# Patient Record
Sex: FEMALE | Race: WHITE | NOT HISPANIC OR LATINO | Employment: OTHER | URBAN - METROPOLITAN AREA
[De-identification: names, ages, dates, MRNs, and addresses within clinical notes are randomized per-mention and may not be internally consistent; named-entity substitution may affect disease eponyms.]

---

## 2017-01-26 ENCOUNTER — HOSPITAL ENCOUNTER (OUTPATIENT)
Dept: RADIOLOGY | Facility: OTHER | Age: 77
Discharge: HOME | End: 2017-01-26
Attending: OBSTETRICS & GYNECOLOGY
Payer: MEDICARE

## 2017-01-26 DIAGNOSIS — N64.4 MASTALGIA: ICD-10-CM

## 2017-01-26 DIAGNOSIS — M79.622 AXILLARY PAIN, LEFT: ICD-10-CM

## 2017-01-26 PROCEDURE — 77066 DX MAMMO INCL CAD BI: CPT | Mod: TC

## 2017-01-26 PROCEDURE — 77062 BREAST TOMOSYNTHESIS BI: CPT | Mod: 26,,, | Performed by: RADIOLOGY

## 2017-01-26 PROCEDURE — 77066 DX MAMMO INCL CAD BI: CPT | Mod: 26,,, | Performed by: RADIOLOGY

## 2018-07-17 ENCOUNTER — HOSPITAL ENCOUNTER (OUTPATIENT)
Dept: RADIOLOGY | Facility: OTHER | Age: 78
Discharge: HOME | End: 2018-07-17
Attending: OBSTETRICS & GYNECOLOGY
Payer: MEDICARE

## 2018-07-17 VITALS — WEIGHT: 195 LBS | BODY MASS INDEX: 35.88 KG/M2 | HEIGHT: 62 IN

## 2018-07-17 DIAGNOSIS — Z12.31 VISIT FOR SCREENING MAMMOGRAM: ICD-10-CM

## 2018-07-17 PROCEDURE — 77067 SCR MAMMO BI INCL CAD: CPT | Mod: 26,,, | Performed by: RADIOLOGY

## 2018-07-17 PROCEDURE — 77063 BREAST TOMOSYNTHESIS BI: CPT | Mod: TC

## 2018-07-17 PROCEDURE — 77063 BREAST TOMOSYNTHESIS BI: CPT | Mod: 26,,, | Performed by: RADIOLOGY

## 2019-06-11 ENCOUNTER — OFFICE VISIT (OUTPATIENT)
Dept: URGENT CARE | Facility: CLINIC | Age: 79
End: 2019-06-11
Payer: MEDICARE

## 2019-06-11 VITALS
OXYGEN SATURATION: 98 % | RESPIRATION RATE: 16 BRPM | HEIGHT: 62 IN | WEIGHT: 187 LBS | SYSTOLIC BLOOD PRESSURE: 146 MMHG | DIASTOLIC BLOOD PRESSURE: 91 MMHG | HEART RATE: 69 BPM | BODY MASS INDEX: 34.41 KG/M2 | TEMPERATURE: 98 F

## 2019-06-11 DIAGNOSIS — J20.9 ACUTE BRONCHITIS, UNSPECIFIED ORGANISM: ICD-10-CM

## 2019-06-11 DIAGNOSIS — R05.9 COUGH: Primary | ICD-10-CM

## 2019-06-11 PROCEDURE — 71046 X-RAY EXAM CHEST 2 VIEWS: CPT | Mod: S$GLB,,, | Performed by: RADIOLOGY

## 2019-06-11 PROCEDURE — 71046 XR CHEST PA AND LATERAL: ICD-10-PCS | Mod: S$GLB,,, | Performed by: RADIOLOGY

## 2019-06-11 PROCEDURE — 99214 PR OFFICE/OUTPT VISIT, EST, LEVL IV, 30-39 MIN: ICD-10-PCS | Mod: S$GLB,,, | Performed by: EMERGENCY MEDICINE

## 2019-06-11 PROCEDURE — 99214 OFFICE O/P EST MOD 30 MIN: CPT | Mod: S$GLB,,, | Performed by: EMERGENCY MEDICINE

## 2019-06-11 RX ORDER — MOXIFLOXACIN HYDROCHLORIDE 400 MG/1
400 TABLET ORAL DAILY
Qty: 7 TABLET | Refills: 0 | Status: SHIPPED | OUTPATIENT
Start: 2019-06-11 | End: 2019-06-18

## 2019-06-11 RX ORDER — BENZONATATE 100 MG/1
100 CAPSULE ORAL EVERY 6 HOURS PRN
Qty: 40 CAPSULE | Refills: 1 | Status: SHIPPED | OUTPATIENT
Start: 2019-06-11 | End: 2019-06-25

## 2019-06-11 RX ORDER — CEFDINIR 300 MG/1
CAPSULE ORAL
Refills: 0 | COMMUNITY
Start: 2019-05-30 | End: 2019-11-08

## 2019-06-11 RX ORDER — ESCITALOPRAM OXALATE 10 MG/1
TABLET ORAL
COMMUNITY
End: 2019-12-09

## 2019-06-11 RX ORDER — CLONAZEPAM 0.5 MG/1
TABLET ORAL
COMMUNITY
End: 2019-12-09

## 2019-06-11 RX ORDER — AZELASTINE HYDROCHLORIDE AND FLUTICASONE PROPIONATE 137; 50 UG/1; UG/1
SPRAY, METERED NASAL
Refills: 1 | COMMUNITY
Start: 2019-04-05 | End: 2021-07-23 | Stop reason: SDUPTHER

## 2019-06-11 RX ORDER — ASPIRIN 81 MG/1
81 TABLET ORAL DAILY
COMMUNITY

## 2019-06-11 RX ORDER — DIAZEPAM 2 MG/1
TABLET ORAL
COMMUNITY
End: 2019-12-09

## 2019-06-11 RX ORDER — DIFLUPREDNATE OPHTHALMIC 0.5 MG/ML
EMULSION OPHTHALMIC
COMMUNITY
End: 2021-10-15

## 2019-06-11 RX ORDER — CETIRIZINE HYDROCHLORIDE 10 MG/1
10 TABLET ORAL DAILY
COMMUNITY

## 2019-06-11 RX ORDER — METHOCARBAMOL 750 MG/1
TABLET, FILM COATED ORAL
COMMUNITY
End: 2019-12-09

## 2019-06-11 RX ORDER — NEOMYCIN SULFATE, POLYMYXIN B SULFATE, AND DEXAMETHASONE 3.5; 10000; 1 MG/G; [USP'U]/G; MG/G
OINTMENT OPHTHALMIC
COMMUNITY
End: 2019-12-09

## 2019-06-11 NOTE — PROGRESS NOTES
"Subjective:       Patient ID: Annette Lynch is a 78 y.o. female.    Vitals:    06/11/19 1516   BP: (!) 146/91   Pulse: 69   Resp: 16   Temp: 98 °F (36.7 °C)   TempSrc: Oral   SpO2: 98%   Weight: 84.8 kg (187 lb)   Height: 5' 2" (1.575 m)       Chief Complaint: Cough    Patient reports cough for 2-3 weeks.patient reports seeing ENT and getting antibiotics on May 31 st.Patient finished antibiotics and is still coughing.    Cough   This is a new problem. Episode onset: 3 weeks. The problem has been gradually worsening. The problem occurs hourly. The cough is productive of sputum. Associated symptoms include postnasal drip and rhinorrhea. Pertinent negatives include no chest pain, chills, ear pain, eye redness, fever, headaches, myalgias, nasal congestion, sore throat, shortness of breath or wheezing. The symptoms are aggravated by lying down. Treatments tried: zyrterc,mucinex,Robitussin. The treatment provided no relief. Her past medical history is significant for environmental allergies.     Review of Systems   Constitution: Negative for chills, fever and malaise/fatigue.   HENT: Positive for postnasal drip and rhinorrhea. Negative for congestion, ear pain, hoarse voice and sore throat.    Eyes: Negative for discharge and redness.   Cardiovascular: Negative for chest pain, dyspnea on exertion and leg swelling.   Respiratory: Positive for cough and sputum production. Negative for shortness of breath and wheezing.    Musculoskeletal: Negative for myalgias.   Gastrointestinal: Negative for abdominal pain and nausea.   Neurological: Negative for headaches.   Allergic/Immunologic: Positive for environmental allergies.       Objective:      Physical Exam   Constitutional: She is oriented to person, place, and time. She appears well-developed and well-nourished. She is cooperative.  Non-toxic appearance. She does not appear ill. No distress.   Elderly, overweight   HENT:   Head: Normocephalic and atraumatic.   Right Ear: " Hearing, tympanic membrane, external ear and ear canal normal.   Left Ear: Hearing, tympanic membrane, external ear and ear canal normal.   Nose: Rhinorrhea present. No mucosal edema or nasal deformity. No epistaxis. Right sinus exhibits no maxillary sinus tenderness and no frontal sinus tenderness. Left sinus exhibits no maxillary sinus tenderness and no frontal sinus tenderness.   Mouth/Throat: Uvula is midline, oropharynx is clear and moist and mucous membranes are normal. No trismus in the jaw. Normal dentition. No uvula swelling. No posterior oropharyngeal erythema.   Eyes: Conjunctivae and lids are normal. No scleral icterus.   Sclera clear bilat   Neck: Trachea normal, full passive range of motion without pain and phonation normal. Neck supple.   Cardiovascular: Normal rate, regular rhythm, normal heart sounds, intact distal pulses and normal pulses.   Pulmonary/Chest: Effort normal and breath sounds normal. No respiratory distress.   +cough on exam   Abdominal: Soft. Normal appearance and bowel sounds are normal. She exhibits no distension. There is no tenderness.   Musculoskeletal: Normal range of motion. She exhibits no edema or deformity.   Neurological: She is alert and oriented to person, place, and time. She exhibits normal muscle tone. Coordination normal.   Skin: Skin is warm, dry and intact. She is not diaphoretic. No pallor.   Psychiatric: She has a normal mood and affect. Her speech is normal and behavior is normal. Judgment and thought content normal. Cognition and memory are normal.   Nursing note and vitals reviewed.      Assessment:       1. Cough    2. Acute bronchitis, unspecified organism        Plan:       Annette was seen today for cough.    Diagnoses and all orders for this visit:    Cough  -     X-Ray Chest PA And Lateral; Future    Acute bronchitis, unspecified organism    Other orders  -     benzonatate (TESSALON PERLES) 100 MG capsule; Take 1 capsule (100 mg total) by mouth every 6  (six) hours as needed for Cough.  -     moxifloxacin (AVELOX) 400 mg tablet; Take 1 tablet (400 mg total) by mouth once daily. for 7 days          X-ray Chest Pa And Lateral    Result Date: 6/11/2019  EXAMINATION: XR CHEST PA AND LATERAL CLINICAL HISTORY: Cough TECHNIQUE: PA and lateral views of the chest were performed. COMPARISON: None FINDINGS: The cardiomediastinal silhouette is not enlarged, noting hiatal hernia..  There is no pleural effusion.  The trachea is midline.  The lungs are symmetrically expanded bilaterally with minimally coarse interstitial attenuation.  There is a possible calcified granuloma projected over the right lower lung zone..  No large focal consolidation seen.  There is no pneumothorax.  The osseous structures are remarkable for degenerative changes..     1. No acute cardiopulmonary process. Electronically signed by: Dudley Lemon MD Date:    06/11/2019 Time:    15:45      Patient Instructions   Please return here or go to the Emergency Department for any concerns or worsening of condition    Zyrtec, Claritin, or Allegra OTC as directed for the next 7 days    Dymista as directed for the next 7 days    Salt Water Nasal Spray OTC 3x/day for the next 7 days      Start taking antibiotics if condition worsens      Follow up with Primary Care if not improved in 7-10 Days:  [id number]      Bronchitis, Viral (Adult)    You have a viral bronchitis. Bronchitis is inflammation and swelling of the lining of the lungs. This is often caused by an infection. Symptoms include a dry, hacking cough that is worse at night. The cough may bring up yellow-green mucus. You may also feel short of breath or wheeze. Other symptoms may include tiredness, chest discomfort, and chills.  Bronchitis that is caused by a virus is not treated with antibiotics. Instead, medicines may be given to help relieve symptoms. Symptoms can last up to 2 weeks, although the cough may last much longer.  This illness is contagious  during the first few days and is spread through the air by coughing and sneezing, or by direct contact (touching the sick person and then touching your own eyes, nose, or mouth).  Most viral illnesses resolve within 10 to 14 days with rest and simple home remedies, although they may sometimes last for several weeks.  Home care  · If symptoms are severe, rest at home for the first 2 to 3 days. When you go back to your usual activities, don't let yourself get too tired.  · Do not smoke. Also avoid being exposed to secondhand smoke.  · You may use over-the-counter medicine to control fever or pain, unless another pain medicine was prescribed. (Note: If you have chronic liver or kidney disease or have ever had a stomach ulcer or gastrointestinal bleeding, talk with your healthcare provider before using these medicines. Also talk to your provider if you are taking medicine to prevent blood clots.) Aspirin should never be given to anyone younger than 18 years of age who is ill with a viral infection or fever. It may cause severe liver or brain damage.  · Your appetite may be poor, so a light diet is fine. Avoid dehydration by drinking 6 to 8 glasses of fluids per day (such as water, soft drinks, sports drinks, juices, tea, or soup). Extra fluids will help loosen secretions in the nose and lungs.  · Over-the-counter cough, cold, and sore-throat medicines will not shorten the length of the illness, but they may help to reduce symptoms. (Note: Do not use decongestants if you have high blood pressure.)  Follow-up care  Follow up with your healthcare provider, or as advised. If you had an X-ray or ECG (electrocardiogram), a specialist will review it. You will be notified of any new findings that may affect your care.  Note: If you are age 65 or older, or if you have a chronic lung disease or condition that affects your immune system, or you smoke, talk to your healthcare provider about having pneumococcal vaccinations and a  yearly influenza vaccination (flu shot).  When to seek medical advice  Call your healthcare provider right away if any of these occur:  · Fever of 100.4°F (38°C) or higher  · Coughing up increased amounts of colored sputum  · Weakness, drowsiness, headache, facial pain, ear pain, or a stiff neck  Call 911, or get immediate medical care  Contact emergency services right away if any of these occur:  · Coughing up blood  · Worsening weakness, drowsiness, headache, or stiff neck  · Trouble breathing, wheezing, or pain with breathing  Date Last Reviewed: 9/13/2015  © 7672-3157 [employer]. [address] [place] [address]. All rights reserved. This information is not intended as a substitute for professional medical care. Always follow your healthcare professional's instructions.        When to Use Antibiotics   Antibiotics are medicines used to treat infections caused by bacteria. They dont work for illnesses caused by viruses or an allergic reaction. In fact, taking antibiotics for reasons other than a bacterial infection can cause problems. For example, you may have side effects from the medicine. And if you really need an antibiotic, it may not work well.                                                                                                                                              When antibiotics wont help  Your healthcare provider wont usually prescribe antibiotics for the following conditions. You can help by not asking for them if you have:   · A cold. This type of illness is caused by a virus. It can cause a runny nose, stuffed-up nose, sneezing, coughing, headache, mild body aches, and low fever. A cold gets better on its own in a few days to a week.  · The flu (influenza). This is a respiratory illness caused by a virus. The flu usually goes away on its own in a week or so. It can cause fever, body aches, sore throat, and fatigue.  · Bronchitis. This is an infection in  the lungs most often caused by a virus. You may have coughing, phlegm, body aches, and a low fever. A common type of bronchitis is known as a chest cold (acute bronchitis). This often happens after you have a respiratory infection like a common cold. Bronchitis can take weeks to go away, but antibiotics usually dont help.  · Most sore throats. Sore throats are most often caused by viruses. Your throat may feel scratchy or achy, and it may hurt to swallow. You may also have a low fever and body aches. A sore throat usually gets better in a few days.  · Most ear infections. An ear infection may be caused by a virus or bacteria. It causes pain in the ear. Antibiotics usually dont help, and the infection goes away on its own.  · Most sinus infections (sinusitis). This kind of infection causes sinus pain and swelling, and a runny nose. In most cases, sinusitis goes away on its own, and antibiotics dont make recovery quicker.  · Allergic rhinitis. This is a set of symptoms caused by an allergic reaction. You may have sneezing, a runny nose, itchy or watery eyes, or a sore throat. Allergies are not treated with antibiotics.  · Low fever. A mild fever thats less than 100.4°F (38°C) most likely doesnt need treatment with antibiotics.   When antibiotics can help   Antibiotics can be used to treat:                                                     · Strep throat. This is a throat infectioncaused by a certain type of bacteria. Symptoms of strep throat include a sore throat, white patches on the tonsils, red spots on the roof of the mouth, fever, body aches, and nausea and vomiting.  · Urinary tract infection (UTI). This is a bacterial infection of the bladder and the tube that takes urine out of the body. It can cause burning pain and urine thats cloudy or tinted with blood. UTIs are very common. Antibiotics usually help treat these infections.  · Some ear infections. In some cases, a healthcare provider may prescribe  antibiotics for an ear infection. You may need a test to show whats causing the ear infection.  · Some sinus infections. In some cases, yourhealthcare provider may give you antibiotics. He or she may first need to make sure your symptoms arent caused by a virus, fungus, allergies, or air pollutants such as smoke.   Your doctor may also recommend antibiotics if you have a condition that can affect your immune system, such as diabetes or cancer.   Self-care at home   If your infection cant be treated with antibiotics, you can take other steps to feel better. Try the remedies below. In general:   · Rest and sleep as much as needed.  · Drink water and other clear fluids.  · Dont smoke, and avoid smoke from other people.  · Use over-the-counter medicine such as acetaminophen to ease pain or fever, as directed by your healthcare provider.   To treat sinus pain or nasal congestion:   · Put a warm, moist washcloth on your face where you feel sinus pain or pressure.  · Use a nasal spray with medicine or saline, as directed by your healthcare provider.  · Breathe in steam from a hot shower.  · Use a humidifier or cool mist vaporizer.   To quiet a cough:   · Use a humidifier or cool mist vaporizer.  · Breathe in steam from a hot shower.  · Use cough lozenges.   To sooth a sore throat:   · Suck on ice chips, popsicles, or lozenges.  · Use a sore throat spray.  · Use a humidifier or cool mist vaporizer.  · Gargle with saltwater.  · Drink warm liquids.   To ease ear pain:   · Hold a warm, moist washcloth on the ear for 10 minutes at a time.  Date Last Reviewed: 9/1/2016  © 5417-5852 [employer]. [address] [place] [address], [place] [address]. All rights reserved. This information is not intended as a substitute for professional medical care. Always follow your healthcare professional's instructions.

## 2019-06-11 NOTE — PATIENT INSTRUCTIONS
Please return here or go to the Emergency Department for any concerns or worsening of condition    Zyrtec, Claritin, or Allegra OTC as directed for the next 7 days    Dymista as directed for the next 7 days    Salt Water Nasal Spray OTC 3x/day for the next 7 days      Start taking antibiotics if condition worsens      Follow up with Primary Care if not improved in 7-10 Days:  [phone]      Bronchitis, Viral (Adult)    You have a viral bronchitis. Bronchitis is inflammation and swelling of the lining of the lungs. This is often caused by an infection. Symptoms include a dry, hacking cough that is worse at night. The cough may bring up yellow-green mucus. You may also feel short of breath or wheeze. Other symptoms may include tiredness, chest discomfort, and chills.  Bronchitis that is caused by a virus is not treated with antibiotics. Instead, medicines may be given to help relieve symptoms. Symptoms can last up to 2 weeks, although the cough may last much longer.  This illness is contagious during the first few days and is spread through the air by coughing and sneezing, or by direct contact (touching the sick person and then touching your own eyes, nose, or mouth).  Most viral illnesses resolve within 10 to 14 days with rest and simple home remedies, although they may sometimes last for several weeks.  Home care  · If symptoms are severe, rest at home for the first 2 to 3 days. When you go back to your usual activities, don't let yourself get too tired.  · Do not smoke. Also avoid being exposed to secondhand smoke.  · You may use over-the-counter medicine to control fever or pain, unless another pain medicine was prescribed. (Note: If you have chronic liver or kidney disease or have ever had a stomach ulcer or gastrointestinal bleeding, talk with your healthcare provider before using these medicines. Also talk to your provider if you are taking medicine to prevent blood clots.) Aspirin should never be given to  anyone younger than 18 years of age who is ill with a viral infection or fever. It may cause severe liver or brain damage.  · Your appetite may be poor, so a light diet is fine. Avoid dehydration by drinking 6 to 8 glasses of fluids per day (such as water, soft drinks, sports drinks, juices, tea, or soup). Extra fluids will help loosen secretions in the nose and lungs.  · Over-the-counter cough, cold, and sore-throat medicines will not shorten the length of the illness, but they may help to reduce symptoms. (Note: Do not use decongestants if you have high blood pressure.)  Follow-up care  Follow up with your healthcare provider, or as advised. If you had an X-ray or ECG (electrocardiogram), a specialist will review it. You will be notified of any new findings that may affect your care.  Note: If you are age 65 or older, or if you have a chronic lung disease or condition that affects your immune system, or you smoke, talk to your healthcare provider about having pneumococcal vaccinations and a yearly influenza vaccination (flu shot).  When to seek medical advice  Call your healthcare provider right away if any of these occur:  · Fever of 100.4°F (38°C) or higher  · Coughing up increased amounts of colored sputum  · Weakness, drowsiness, headache, facial pain, ear pain, or a stiff neck  Call 911, or get immediate medical care  Contact emergency services right away if any of these occur:  · Coughing up blood  · Worsening weakness, drowsiness, headache, or stiff neck  · Trouble breathing, wheezing, or pain with breathing  Date Last Reviewed: 9/13/2015 © 2000-2016 "[employer]". [address] [place] [address], [place] [address]. All rights reserved. This information is not intended as a substitute for professional medical care. Always follow your healthcare professional's instructions.        When to Use Antibiotics   Antibiotics are medicines used to treat infections caused by bacteria. They dont work for  illnesses caused by viruses or an allergic reaction. In fact, taking antibiotics for reasons other than a bacterial infection can cause problems. For example, you may have side effects from the medicine. And if you really need an antibiotic, it may not work well.                                                                                                                                              When antibiotics wont help  Your healthcare provider wont usually prescribe antibiotics for the following conditions. You can help by not asking for them if you have:   · A cold. This type of illness is caused by a virus. It can cause a runny nose, stuffed-up nose, sneezing, coughing, headache, mild body aches, and low fever. A cold gets better on its own in a few days to a week.  · The flu (influenza). This is a respiratory illness caused by a virus. The flu usually goes away on its own in a week or so. It can cause fever, body aches, sore throat, and fatigue.  · Bronchitis. This is an infection in the lungs most often caused by a virus. You may have coughing, phlegm, body aches, and a low fever. A common type of bronchitis is known as a chest cold (acute bronchitis). This often happens after you have a respiratory infection like a common cold. Bronchitis can take weeks to go away, but antibiotics usually dont help.  · Most sore throats. Sore throats are most often caused by viruses. Your throat may feel scratchy or achy, and it may hurt to swallow. You may also have a low fever and body aches. A sore throat usually gets better in a few days.  · Most ear infections. An ear infection may be caused by a virus or bacteria. It causes pain in the ear. Antibiotics usually dont help, and the infection goes away on its own.  · Most sinus infections (sinusitis). This kind of infection causes sinus pain and swelling, and a runny nose. In most cases, sinusitis goes away on its own, and antibiotics dont make recovery  quicker.  · Allergic rhinitis. This is a set of symptoms caused by an allergic reaction. You may have sneezing, a runny nose, itchy or watery eyes, or a sore throat. Allergies are not treated with antibiotics.  · Low fever. A mild fever thats less than 100.4°F (38°C) most likely doesnt need treatment with antibiotics.   When antibiotics can help   Antibiotics can be used to treat:                                                     · Strep throat. This is a throat infectioncaused by a certain type of bacteria. Symptoms of strep throat include a sore throat, white patches on the tonsils, red spots on the roof of the mouth, fever, body aches, and nausea and vomiting.  · Urinary tract infection (UTI). This is a bacterial infection of the bladder and the tube that takes urine out of the body. It can cause burning pain and urine thats cloudy or tinted with blood. UTIs are very common. Antibiotics usually help treat these infections.  · Some ear infections. In some cases, a healthcare provider may prescribe antibiotics for an ear infection. You may need a test to show whats causing the ear infection.  · Some sinus infections. In some cases, yourhealthcare provider may give you antibiotics. He or she may first need to make sure your symptoms arent caused by a virus, fungus, allergies, or air pollutants such as smoke.   Your doctor may also recommend antibiotics if you have a condition that can affect your immune system, such as diabetes or cancer.   Self-care at home   If your infection cant be treated with antibiotics, you can take other steps to feel better. Try the remedies below. In general:   · Rest and sleep as much as needed.  · Drink water and other clear fluids.  · Dont smoke, and avoid smoke from other people.  · Use over-the-counter medicine such as acetaminophen to ease pain or fever, as directed by your healthcare provider.   To treat sinus pain or nasal congestion:   · Put a warm, moist washcloth on  your face where you feel sinus pain or pressure.  · Use a nasal spray with medicine or saline, as directed by your healthcare provider.  · Breathe in steam from a hot shower.  · Use a humidifier or cool mist vaporizer.   To quiet a cough:   · Use a humidifier or cool mist vaporizer.  · Breathe in steam from a hot shower.  · Use cough lozenges.   To sooth a sore throat:   · Suck on ice chips, popsicles, or lozenges.  · Use a sore throat spray.  · Use a humidifier or cool mist vaporizer.  · Gargle with saltwater.  · Drink warm liquids.   To ease ear pain:   · Hold a warm, moist washcloth on the ear for 10 minutes at a time.  Date Last Reviewed: 9/1/2016  © 3489-7097 [employer]. [address] [place] [address], [place] [address]. All rights reserved. This information is not intended as a substitute for professional medical care. Always follow your healthcare professional's instructions.

## 2019-06-11 NOTE — PROGRESS NOTES
"Subjective:       Patient ID: Annette Lynch is a 78 y.o. female.    Vitals:    06/11/19 1516   BP: (!) 146/91   Pulse: 69   Resp: 16   Temp: 98 °F (36.7 °C)   TempSrc: Oral   SpO2: 98%   Weight: 84.8 kg (187 lb)   Height: 5' 2" (1.575 m)       Chief Complaint: Cough    Patient reports she saw ENT on may 31 st and got antibiotic.Patient states she has finished antibiotic and is still coughing.Patient reports productive for 3 weeks.Patient called ENT and they suggested she get a Chest x-ray.    Cough   This is a recurrent problem. Episode onset: 3 weeks. The problem has been gradually worsening. The problem occurs hourly. The cough is productive of sputum. Associated symptoms include postnasal drip and rhinorrhea. Pertinent negatives include no chest pain, chills, ear pain, eye redness, fever, headaches, myalgias, nasal congestion, sore throat, shortness of breath or wheezing. The symptoms are aggravated by lying down. Treatments tried: antibiotics;zyrtec.mucinexMaddison DM. The treatment provided no relief. allergies     Review of Systems   Constitution: Negative for chills, fever and malaise/fatigue.   HENT: Positive for postnasal drip and rhinorrhea. Negative for congestion, ear pain, hoarse voice and sore throat.    Eyes: Negative for discharge and redness.   Cardiovascular: Negative for chest pain, dyspnea on exertion and leg swelling.   Respiratory: Positive for cough and sputum production (cloudy). Negative for shortness of breath and wheezing.    Musculoskeletal: Negative for myalgias.   Gastrointestinal: Negative for abdominal pain and nausea.   Neurological: Negative for headaches.       Objective:      Physical Exam    Assessment:       No diagnosis found.    Plan:       There are no diagnoses linked to this encounter.      "

## 2019-08-25 ENCOUNTER — OFFICE VISIT (OUTPATIENT)
Dept: URGENT CARE | Facility: CLINIC | Age: 79
End: 2019-08-25
Payer: MEDICARE

## 2019-08-25 VITALS
OXYGEN SATURATION: 95 % | BODY MASS INDEX: 34.41 KG/M2 | RESPIRATION RATE: 16 BRPM | HEIGHT: 62 IN | HEART RATE: 84 BPM | SYSTOLIC BLOOD PRESSURE: 169 MMHG | TEMPERATURE: 97 F | WEIGHT: 187 LBS | DIASTOLIC BLOOD PRESSURE: 83 MMHG

## 2019-08-25 DIAGNOSIS — T50.905A MEDICATION REACTION, INITIAL ENCOUNTER: Primary | ICD-10-CM

## 2019-08-25 DIAGNOSIS — L03.031 PARONYCHIA OF GREAT TOE OF RIGHT FOOT: ICD-10-CM

## 2019-08-25 PROCEDURE — 99214 PR OFFICE/OUTPT VISIT, EST, LEVL IV, 30-39 MIN: ICD-10-PCS | Mod: S$GLB,,, | Performed by: NURSE PRACTITIONER

## 2019-08-25 PROCEDURE — 99214 OFFICE O/P EST MOD 30 MIN: CPT | Mod: S$GLB,,, | Performed by: NURSE PRACTITIONER

## 2019-08-25 RX ORDER — MUPIROCIN 20 MG/G
OINTMENT TOPICAL
Qty: 22 G | Refills: 1 | Status: SHIPPED | OUTPATIENT
Start: 2019-08-25 | End: 2019-12-09

## 2019-08-25 RX ORDER — CEPHALEXIN 500 MG/1
CAPSULE ORAL
Refills: 0 | COMMUNITY
Start: 2019-08-21 | End: 2019-11-08

## 2019-08-25 NOTE — PATIENT INSTRUCTIONS
As discussed...  Continue with warm epsom salt soaks of the toe.  Topical antibiotic as directed and keep covered.  Stop the use of Keflex.  Follow up closely with podiatry.  A referral was placed for you, call [phone] to schedule appointment.  Return here or go to the ER for any worsening symptoms.  Drug Reaction (Other Type)  You are having a reaction to a drug you have taken. This may not be the same as an allergic reaction. It is an undesired, unfavorable reaction, or a side effect of a medicine. This can cause a variety of symptoms, including:  · Dizziness or headache  · Rash  · Flushing or hot sensation  · Nausea, vomiting, or stomach pain  · Diarrhea or constipation  · Trouble breathing  · High or low blood pressure  A reaction can be an upset stomach from something like aspirin or ibuprofen, feeling faint after taking a blood pressure medicine, feeling anxious, and many other things. Symptoms of a drug reaction can range from very mild to very severe.  In most cases, the reaction goes away within 1 to 12 hours. However, it will probably occur again if you take this same medicine. Your healthcare provider will advise you whether to change how much, when, or how often you take this medicine. He or she may also advise you to discontinue this medicine or switch to another one.   Home care  · Another medicine may be recommended to reduce your symptoms until the drugs effect wears off. Follow your healthcare providers advice.  · When the drugs effect has worn off, there should be no further problem as long as you don't take the same drug again.   · Ask your healthcare provider if you should also avoid similar drugs. Write down the information so you will remember it.  · Make certain the drug reaction is documented in your medical record.  Follow-up care  Follow up with your healthcare provider, or as advised if your symptoms are not better within 24 hours.  When to seek medical advice  Call your healthcare  provider right away if any of these occur.  · New symptoms that concern you  · Worsening of your current symptoms, including rash or facial swelling  · Symptoms that are not relieved by the treatment advised  · A fever of 100.4°F (38°C),or as advised by your healthcare provider  Call 911  Call emergency services right away if any of these occur:  · Trouble breathing or swallowing, or wheezing  · Hoarse voice or trouble speaking  · Confusion  · Extreme drowsiness or trouble awakening  · Fainting or loss of consciousness  · Rapid heart rate or slow heart rate  · Very low or very high blood pressure  · Vomiting blood, or large amounts of blood in stool  · Seizure  Date Last Reviewed: 4/1/2017  © 1081-3536 [employer]. [address] [place] [address], [place] [address]. All rights reserved. This information is not intended as a substitute for professional medical care. Always follow your healthcare professional's instructions.        Paronychia of the Finger or Toe  Paronychia is an infection near a fingernail or toenail. It usually occurs when an opening in the cuticle or an ingrown toenail lets bacteria under the skin.  The infection will need to be drained if pus is present. If the infection has been caught early, you may need only antibiotic treatment. Healing will take about 1 to 2 weeks.  Home care  Follow these guidelines when caring for yourself at home:  · Clean and soak the toe or finger. Do this 2 times a day for the first 3 days. To do so:  ¨ Soak your foot or hand in a tub of warm water for 5 minutes. Or hold your toe or finger under a faucet of warm running water for 5 minutes.  ¨ Clean any crust away with soap and water using a cotton swab.  ¨ Put antibiotic ointment on the infected area.  · Change the dressing daily or any time it gets dirty.  · If you were given antibiotics, take them as directed until they are all gone.  · If your infection is on a toe, wear comfortable shoes with a lot of toe  room. You can also wear open-toed sandals while your toe heals.  · You may use over-the-counter medicine (acetaminophen or ibuprofen to help with pain, unless another medicine was prescribed. If you have chronic liver or kidney disease, talk with your healthcare provider before using these medicines. Also talk with your provider if you've had a stomach ulcer or GI (gastrointestinal) bleeding.  Prevention  The following can prevent paronychia:  · Avoid cutting or playing with your cuticles at home.  · Don't bite your nails.  · Don't suck on your thumbs or fingers.  Follow-up care  Follow up with your healthcare provider, or as advised.  When to seek medical advice  Call your healthcare provider right away if any of these occur:  · Redness, pain, or swelling of the finger or toe gets worse  · Red streaks in the skin leading away from the wound  · Pus or fluid draining from the nail area  · Fever of 100.4ºF (38ºC) or higher, or as directed by your provider  Date Last Reviewed: 8/1/2016 © 2000-2017 [employer]. [address] [place] [address], [place] [address]. All rights reserved. This information is not intended as a substitute for professional medical care. Always follow your healthcare professional's instructions.

## 2019-08-25 NOTE — PROGRESS NOTES
"Subjective:       Patient ID: Annette Lynch is a 78 y.o. female.    Vitals:    08/25/19 1450   BP: (!) 169/83   Pulse: 84   Resp: 16   Temp: 96.9 °F (36.1 °C)   SpO2: 95%   Weight: 84.8 kg (187 lb)   Height: 5' 2" (1.575 m)       Chief Complaint: Allergic Reaction    Pt states nausea, diarrhea, and "shaky" feeling. Pt states she has been taking Keflex  X 5 days. Pt is taking Keflex for ingrown toe nails. Pt suspects reaction to Keflex.  States that it made her feel jittery as well.  Denies chest pain or shortness of breath.  Denies wheezing.  Denies rash.  She states that the toe is much better.      Allergic Reaction   This is a new problem. The current episode started 5 to 7 days ago. The problem is unchanged. The patient was exposed to a prescription drug. Associated symptoms include diarrhea. Pertinent negatives include no abdominal pain, chest pain, chest pressure, coughing, difficulty breathing, drooling, eye itching, eye redness, eye watering, globus sensation, hyperventilation, itching, rash, skin blistering, stridor, trouble swallowing, vomiting or wheezing. Past treatments include acetaminophen (pepto, immodium, ). The treatment provided mild relief. There is no swelling present.   Diarrhea    This is a new problem. The current episode started yesterday. The problem occurs 5 to 10 times per day. The problem has been resolved. The stool consistency is described as watery. Associated symptoms include arthralgias. Pertinent negatives include no abdominal pain, bloating, chills, coughing, fever, headaches, increased  flatus, myalgias, sweats, URI, vomiting or weight loss.     Review of Systems   Constitution: Negative for chills, fever and weight loss.   HENT: Negative for drooling, sore throat, stridor and trouble swallowing.    Eyes: Negative for blurred vision, itching and redness.   Cardiovascular: Negative for chest pain.   Respiratory: Negative for cough, shortness of breath and wheezing.    Skin: " Negative for itching and rash.   Musculoskeletal: Positive for arthralgias. Negative for back pain, joint pain and myalgias.   Gastrointestinal: Positive for diarrhea and nausea. Negative for bloating, abdominal pain, flatus and vomiting.   Neurological: Negative for headaches.   Psychiatric/Behavioral: The patient is nervous/anxious.        Objective:      Physical Exam   Constitutional: She is oriented to person, place, and time. She appears well-developed and well-nourished. She is cooperative.  Non-toxic appearance. She does not appear ill. No distress.   HENT:   Head: Normocephalic and atraumatic. Head is without abrasion, without contusion and without laceration.   Right Ear: Hearing, tympanic membrane, external ear and ear canal normal.   Left Ear: Hearing, tympanic membrane, external ear and ear canal normal.   Nose: Nose normal. No mucosal edema, rhinorrhea or nasal deformity. No epistaxis. Right sinus exhibits no maxillary sinus tenderness and no frontal sinus tenderness. Left sinus exhibits no maxillary sinus tenderness and no frontal sinus tenderness.   Mouth/Throat: Uvula is midline, oropharynx is clear and moist and mucous membranes are normal. No trismus in the jaw. Normal dentition. No uvula swelling. No posterior oropharyngeal erythema.   Eyes: Pupils are equal, round, and reactive to light. Conjunctivae, EOM and lids are normal. No scleral icterus.   Sclera clear bilat   Neck: Trachea normal, full passive range of motion without pain and phonation normal. Neck supple.   Cardiovascular: Normal rate, regular rhythm, normal heart sounds, intact distal pulses and normal pulses.   Pulses:       Dorsalis pedis pulses are 2+ on the left side.   Pulmonary/Chest: Effort normal and breath sounds normal. No stridor. No respiratory distress.   Abdominal: Soft. Normal appearance and bowel sounds are normal. She exhibits no distension. There is no tenderness. There is no rigidity, no rebound, no guarding and no  CVA tenderness.   Musculoskeletal: Normal range of motion. She exhibits no edema or deformity.   Feet:   Left Foot:   Skin Integrity: Positive for erythema. Negative for ulcer, blister, skin breakdown, warmth, callus or dry skin.   Neurological: She is alert and oriented to person, place, and time. She exhibits normal muscle tone. Coordination normal.   Skin: Skin is warm, dry and intact. Capillary refill takes less than 2 seconds. No abrasion, no bruising, no burn, no ecchymosis, no laceration, no lesion and no rash noted. She is not diaphoretic. No erythema. No pallor.   Very mild induration to right lateral nailfold of great toe.  No drainage or fluctuance.     Psychiatric: She has a normal mood and affect. Her speech is normal and behavior is normal. Judgment and thought content normal. Cognition and memory are normal.   Nursing note and vitals reviewed.      Assessment:       1. Medication reaction, initial encounter    2. Paronychia of great toe of right foot        Plan:       Annette was seen today for allergic reaction.    Diagnoses and all orders for this visit:    Medication reaction, initial encounter    Paronychia of great toe of right foot  -     mupirocin (BACTROBAN) 2 % ointment; Apply to affected area 3 times daily  -     Ambulatory referral to Podiatry      Patient Instructions   As discussed...  Continue with warm epsom salt soaks of the toe.  Topical antibiotic as directed and keep covered.  Stop the use of Keflex.  Follow up closely with podiatry.  A referral was placed for you, call [phone] to schedule appointment.  Return here or go to the ER for any worsening symptoms.  Drug Reaction (Other Type)  You are having a reaction to a drug you have taken. This may not be the same as an allergic reaction. It is an undesired, unfavorable reaction, or a side effect of a medicine. This can cause a variety of symptoms, including:  · Dizziness or headache  · Rash  · Flushing or hot sensation  · Nausea,  vomiting, or stomach pain  · Diarrhea or constipation  · Trouble breathing  · High or low blood pressure  A reaction can be an upset stomach from something like aspirin or ibuprofen, feeling faint after taking a blood pressure medicine, feeling anxious, and many other things. Symptoms of a drug reaction can range from very mild to very severe.  In most cases, the reaction goes away within 1 to 12 hours. However, it will probably occur again if you take this same medicine. Your healthcare provider will advise you whether to change how much, when, or how often you take this medicine. He or she may also advise you to discontinue this medicine or switch to another one.   Home care  · Another medicine may be recommended to reduce your symptoms until the drugs effect wears off. Follow your healthcare providers advice.  · When the drugs effect has worn off, there should be no further problem as long as you don't take the same drug again.   · Ask your healthcare provider if you should also avoid similar drugs. Write down the information so you will remember it.  · Make certain the drug reaction is documented in your medical record.  Follow-up care  Follow up with your healthcare provider, or as advised if your symptoms are not better within 24 hours.  When to seek medical advice  Call your healthcare provider right away if any of these occur.  · New symptoms that concern you  · Worsening of your current symptoms, including rash or facial swelling  · Symptoms that are not relieved by the treatment advised  · A fever of 100.4°F (38°C),or as advised by your healthcare provider  Call 911  Call emergency services right away if any of these occur:  · Trouble breathing or swallowing, or wheezing  · Hoarse voice or trouble speaking  · Confusion  · Extreme drowsiness or trouble awakening  · Fainting or loss of consciousness  · Rapid heart rate or slow heart rate  · Very low or very high blood pressure  · Vomiting blood, or large  amounts of blood in stool  · Seizure  Date Last Reviewed: 4/1/2017  © 2709-8541 [employer]. [address] [place] [address], [place] [address]. All rights reserved. This information is not intended as a substitute for professional medical care. Always follow your healthcare professional's instructions.        Paronychia of the Finger or Toe  Paronychia is an infection near a fingernail or toenail. It usually occurs when an opening in the cuticle or an ingrown toenail lets bacteria under the skin.  The infection will need to be drained if pus is present. If the infection has been caught early, you may need only antibiotic treatment. Healing will take about 1 to 2 weeks.  Home care  Follow these guidelines when caring for yourself at home:  · Clean and soak the toe or finger. Do this 2 times a day for the first 3 days. To do so:  ¨ Soak your foot or hand in a tub of warm water for 5 minutes. Or hold your toe or finger under a faucet of warm running water for 5 minutes.  ¨ Clean any crust away with soap and water using a cotton swab.  ¨ Put antibiotic ointment on the infected area.  · Change the dressing daily or any time it gets dirty.  · If you were given antibiotics, take them as directed until they are all gone.  · If your infection is on a toe, wear comfortable shoes with a lot of toe room. You can also wear open-toed sandals while your toe heals.  · You may use over-the-counter medicine (acetaminophen or ibuprofen to help with pain, unless another medicine was prescribed. If you have chronic liver or kidney disease, talk with your healthcare provider before using these medicines. Also talk with your provider if you've had a stomach ulcer or GI (gastrointestinal) bleeding.  Prevention  The following can prevent paronychia:  · Avoid cutting or playing with your cuticles at home.  · Don't bite your nails.  · Don't suck on your thumbs or fingers.  Follow-up care  Follow up with your healthcare provider, or as  advised.  When to seek medical advice  Call your healthcare provider right away if any of these occur:  · Redness, pain, or swelling of the finger or toe gets worse  · Red streaks in the skin leading away from the wound  · Pus or fluid draining from the nail area  · Fever of 100.4ºF (38ºC) or higher, or as directed by your provider  Date Last Reviewed: 8/1/2016  © 8339-3031 [employer]. [address] [place] [address], [place] [address]. All rights reserved. This information is not intended as a substitute for professional medical care. Always follow your healthcare professional's instructions.

## 2019-08-29 ENCOUNTER — OFFICE VISIT (OUTPATIENT)
Dept: PODIATRY | Facility: CLINIC | Age: 79
End: 2019-08-29
Payer: MEDICARE

## 2019-08-29 VITALS
WEIGHT: 187 LBS | DIASTOLIC BLOOD PRESSURE: 69 MMHG | HEART RATE: 69 BPM | HEIGHT: 62 IN | SYSTOLIC BLOOD PRESSURE: 141 MMHG | BODY MASS INDEX: 34.41 KG/M2

## 2019-08-29 DIAGNOSIS — M79.674 TOE PAIN, RIGHT: ICD-10-CM

## 2019-08-29 DIAGNOSIS — B35.1 ONYCHOMYCOSIS DUE TO DERMATOPHYTE: ICD-10-CM

## 2019-08-29 DIAGNOSIS — L03.031 PARONYCHIA, TOE, RIGHT: ICD-10-CM

## 2019-08-29 DIAGNOSIS — L60.0 INGROWN NAIL: Primary | ICD-10-CM

## 2019-08-29 PROCEDURE — 99999 PR PBB SHADOW E&M-EST. PATIENT-LVL III: ICD-10-PCS | Mod: PBBFAC,,, | Performed by: PODIATRIST

## 2019-08-29 PROCEDURE — 99203 PR OFFICE/OUTPT VISIT, NEW, LEVL III, 30-44 MIN: ICD-10-PCS | Mod: S$PBB,,, | Performed by: PODIATRIST

## 2019-08-29 PROCEDURE — 99999 PR PBB SHADOW E&M-EST. PATIENT-LVL III: CPT | Mod: PBBFAC,,, | Performed by: PODIATRIST

## 2019-08-29 PROCEDURE — 99213 OFFICE O/P EST LOW 20 MIN: CPT | Mod: PBBFAC | Performed by: PODIATRIST

## 2019-08-29 PROCEDURE — 99203 OFFICE O/P NEW LOW 30 MIN: CPT | Mod: S$PBB,,, | Performed by: PODIATRIST

## 2019-08-29 RX ORDER — CICLOPIROX 80 MG/ML
SOLUTION TOPICAL NIGHTLY
Qty: 6.6 ML | Refills: 11 | Status: SHIPPED | OUTPATIENT
Start: 2019-08-29 | End: 2021-01-08

## 2019-08-29 NOTE — LETTER
August 29, 2019      Jessica Quintanilla, NP  [address] [facility] [place] [address]           [facility] - Podiatry  [address]  [place] [address]  Phone: [phone]          Patient: Annette Lynch   MR Number: [id number]   YOB: 1940   Date of Visit: 8/29/2019       Dear Jessica Quintanilla:    Thank you for referring Annette Lynch to me for evaluation. Attached you will find relevant portions of my assessment and plan of care.    If you have questions, please do not hesitate to call me. I look forward to following Annette Lynch along with you.    Sincerely,    Crow Santos, ANN    Enclosure  CC:  No Recipients    If you would like to receive this communication electronically, please contact [email or web address] or [phone] to request more information on [email or web address] Link access.    For providers and/or their staff who would like to refer a patient to [facility], please contact us through our one-stop-shop provider referral line, [facility], at [phone].    If you feel you have received this communication in error or would no longer like to receive these types of communications, please e-mail [email or web address]

## 2019-08-29 NOTE — PROGRESS NOTES
Subjective:      Patient ID: Annette Lynch is a 78 y.o. female.    Chief Complaint: Nail Care (b/l)    Sharp throbbing pain medial right hallux.  Gradual onset, worsening then improving some over past 10 days, aggravated by increased weight bearing, shoe gear, pressure. Oral and topical antibiotic improving condition well.  No self treatment.    CC2 discolored nails withdebris both feet. Gradual onset, worsening over past several weeks, aggravated by increased weight bearing, shoe gear, pressure.  No previous medical treatment.  No self treatment.   Review of Systems   Constitution: Negative for chills, diaphoresis, fever, malaise/fatigue and night sweats.   Cardiovascular: Negative for claudication, cyanosis, leg swelling and syncope.   Skin: Positive for nail changes. Negative for color change, dry skin, rash, suspicious lesions and unusual hair distribution.   Musculoskeletal: Negative for falls, joint pain, joint swelling, muscle cramps, muscle weakness and stiffness.   Gastrointestinal: Negative for constipation, diarrhea, nausea and vomiting.   Neurological: Negative for brief paralysis, disturbances in coordination, focal weakness, numbness, paresthesias, sensory change and tremors.           Objective:      Physical Exam   Constitutional: She is oriented to person, place, and time. She appears well-developed and well-nourished. She is cooperative.   Oriented to time, place, and person.   Cardiovascular:   Pulses:       Dorsalis pedis pulses are 1+ on the right side, and 1+ on the left side.        Posterior tibial pulses are 1+ on the right side, and 1+ on the left side.   Capillary fill time 3-5 seconds.  All toes warm to touch.      Negative lower extremity edema bilateral.    Negative elevational pallor and dependent rubor bilateral.     Musculoskeletal:   Normal angle, base, station of gait. Decreased stride length, early heel off, moderately propulsive toe off bilateral.    All ten toes without  clubbing, cyanosis, or signs of ischemia.      No pain to palpation bilateral lower extremities.      Range of motion, stability, muscle strength, and muscle tone are age and health appropriate normal bilateral feet and legs.       Lymphadenopathy:   Negative lymphadenopathy bilateral popliteal fossa and tarsal tunnel.  Negative lymphangitic streaking bilateral foot/ankle bilateral.     Neurological: She is alert and oriented to person, place, and time. She has normal strength. She is not disoriented. She displays no atrophy and no tremor. She exhibits normal muscle tone.   Reflex Scores:       Patellar reflexes are 2+ on the right side and 2+ on the left side.       Achilles reflexes are 2+ on the right side and 2+ on the left side.  Negative tinel sign to percussion sural, superficial peroneal, deep peroneal, saphenous, and posterior tibial nerves right and left ankles and feet.     Skin: Skin is warm, dry and intact. No abrasion, no bruising, no burn, no ecchymosis, no laceration, no lesion, no petechiae and no rash noted. She is not diaphoretic. No cyanosis or erythema. No pallor. Nails show no clubbing.   Visible and palpable ingrowth of toenail medial border right hallux with pain to palpation, and minimal focal localized erythema,  without ulceration, drainage, pus, tracking, fluctuance, malodor, or cardinal signs infection.    Skin thin, atrophic, with decreased density and distribution of pedal hair bilateral, but without hyperpigmentation, blanca discoloration,  ulcers, masses, nodules or cords palpated bilateral feet and legs.      Toenails  3rd, 4th, 5th  bilateral are, discolored tanish brown with tan,  White crumbly subungual debris.  nontender to distal nail plate pressure, without periungual skin abnormality of each.               Assessment:       Encounter Diagnoses   Name Primary?    Ingrown nail Yes    Paronychia, toe, right     Toe pain, right     Onychomycosis due to dermatophyte           Plan:       Annette was seen today for nail care.    Diagnoses and all orders for this visit:    Ingrown nail    Paronychia, toe, right    Toe pain, right    Onychomycosis due to dermatophyte    Other orders  -     ciclopirox (PENLAC) 8 % Soln; Apply topically nightly.      I counseled the patient on her conditions, their implications and medical management.        Continue topical antibiotic.  Cover right hallux all times with band aid dressings changing daily.    Discussed conservative treatment with shoes of adequate dimensions, material, and style to alleviate symptoms and delay or prevent surgical intervention.    Rx penlac.          Follow up if symptoms worsen or fail to improve.

## 2019-10-06 ENCOUNTER — OFFICE VISIT (OUTPATIENT)
Dept: URGENT CARE | Facility: CLINIC | Age: 79
End: 2019-10-06
Payer: MEDICARE

## 2019-10-06 VITALS
SYSTOLIC BLOOD PRESSURE: 151 MMHG | TEMPERATURE: 97 F | HEIGHT: 62 IN | OXYGEN SATURATION: 96 % | BODY MASS INDEX: 34.41 KG/M2 | WEIGHT: 187 LBS | HEART RATE: 72 BPM | RESPIRATION RATE: 16 BRPM | DIASTOLIC BLOOD PRESSURE: 85 MMHG

## 2019-10-06 DIAGNOSIS — R11.0 NAUSEA: Primary | ICD-10-CM

## 2019-10-06 DIAGNOSIS — R53.83 FATIGUE, UNSPECIFIED TYPE: ICD-10-CM

## 2019-10-06 LAB
GLUCOSE SERPL-MCNC: 104 MG/DL (ref 70–110)
POC ANION GAP: 18 MMOL/L (ref 10–20)
POC BUN: 18 MMOL/L (ref 8–26)
POC CHLORIDE: 100 MMOL/L (ref 98–109)
POC CREATININE: 0.9 MG/DL (ref 0.6–1.3)
POC HEMATOCRIT: 48 %PCV (ref 37–47)
POC HEMOGLOBIN: 16.3 G/DL (ref 12.5–16)
POC ICA: 1.16 MMOL/L (ref 1.12–1.32)
POC POTASSIUM: 4.4 MMOL/L (ref 3.5–4.9)
POC SODIUM: 138 MMOL/L (ref 138–146)
POC TCO2: 26 MMOL/L (ref 24–29)

## 2019-10-06 PROCEDURE — 99214 PR OFFICE/OUTPT VISIT, EST, LEVL IV, 30-39 MIN: ICD-10-PCS | Mod: 25,S$GLB,, | Performed by: FAMILY MEDICINE

## 2019-10-06 PROCEDURE — 99214 OFFICE O/P EST MOD 30 MIN: CPT | Mod: 25,S$GLB,, | Performed by: FAMILY MEDICINE

## 2019-10-06 PROCEDURE — 80047 POCT CHEMISTRY PANEL: ICD-10-PCS | Mod: QW,S$GLB,, | Performed by: FAMILY MEDICINE

## 2019-10-06 PROCEDURE — 80047 BASIC METABLC PNL IONIZED CA: CPT | Mod: QW,S$GLB,, | Performed by: FAMILY MEDICINE

## 2019-10-06 RX ORDER — ONDANSETRON 4 MG/1
4 TABLET, ORALLY DISINTEGRATING ORAL
Status: COMPLETED | OUTPATIENT
Start: 2019-10-06 | End: 2019-10-06

## 2019-10-06 RX ORDER — ONDANSETRON 4 MG/1
4 TABLET, ORALLY DISINTEGRATING ORAL EVERY 8 HOURS PRN
Qty: 12 TABLET | Refills: 0 | Status: SHIPPED | OUTPATIENT
Start: 2019-10-06 | End: 2020-05-04

## 2019-10-06 RX ADMIN — ONDANSETRON 4 MG: 4 TABLET, ORALLY DISINTEGRATING ORAL at 02:10

## 2019-10-06 NOTE — PROGRESS NOTES
"Subjective:       Patient ID: Annette Lynch is a 78 y.o. female.    Vitals:    10/06/19 1431   BP: (!) 151/85   Pulse: 72   Resp: 16   Temp: 97.3 °F (36.3 °C)   SpO2: 96%   Weight: 84.8 kg (187 lb)   Height: 5' 2" (1.575 m)       Chief Complaint: Nausea    Pt states she hasn't been feeling well for over a week. Pt states she had diarrhea on 1/4/19. Pt states headache, nausea, and fatigue.    Nausea   This is a new problem. The current episode started 1 to 4 weeks ago. The problem occurs constantly. The problem has been unchanged. Associated symptoms include headaches, myalgias and nausea. Pertinent negatives include no abdominal pain, chest pain, chills, fever, rash, sore throat or vomiting. Nothing aggravates the symptoms. Treatments tried: pepto bismol, amatrol. The treatment provided mild relief.     Review of Systems   Constitution: Positive for malaise/fatigue. Negative for chills and fever.   HENT: Negative for sore throat.    Eyes: Negative for blurred vision.   Cardiovascular: Negative for chest pain.   Respiratory: Negative for shortness of breath.    Skin: Negative for rash.   Musculoskeletal: Positive for myalgias. Negative for back pain and joint pain.   Gastrointestinal: Positive for diarrhea and nausea. Negative for abdominal pain and vomiting.   Neurological: Positive for headaches.   Psychiatric/Behavioral: The patient is not nervous/anxious.        PMH/PSH/FH/SH/MED/ALLERGY reviewed    Objective:       Vitals:    10/06/19 1431   BP: (!) 151/85   Pulse: 72   Resp: 16   Temp: 97.3 °F (36.3 °C)       Physical Exam   Constitutional: She is oriented to person, place, and time. She appears well-developed and well-nourished. No distress.   HENT:   Head: Normocephalic and atraumatic.   Right Ear: External ear normal.   Left Ear: External ear normal.   Nose: Nose normal.   Mouth/Throat: Oropharynx is clear and moist. No oropharyngeal exudate.   Eyes: Pupils are equal, round, and reactive to light. " Conjunctivae and EOM are normal. Right eye exhibits no discharge. Left eye exhibits no discharge. No scleral icterus.   Neck: Normal range of motion. Neck supple. No JVD present. No tracheal deviation present. No thyromegaly present.   Cardiovascular: Normal rate, regular rhythm, normal heart sounds and intact distal pulses. Exam reveals no gallop and no friction rub.   No murmur heard.  Pulmonary/Chest: Effort normal and breath sounds normal. No stridor. She has no wheezes. She has no rales. She exhibits no tenderness.   Abdominal: Soft. Bowel sounds are normal. She exhibits no distension and no mass. There is no tenderness. There is no rebound and no guarding. No hernia.   Musculoskeletal: Normal range of motion. She exhibits no edema or tenderness.   Lymphadenopathy:     She has no cervical adenopathy.   Neurological: She is alert and oriented to person, place, and time. She has normal reflexes. She displays normal reflexes. No cranial nerve deficit. She exhibits normal muscle tone. Coordination normal.   Skin: Skin is warm and dry. No rash noted. She is not diaphoretic. No erythema. No pallor.   Psychiatric: She has a normal mood and affect. Her behavior is normal. Judgment and thought content normal.       Office Visit on 10/06/2019   Component Date Value Ref Range Status    POC Sodium 10/06/2019 138  138 - 146 MMOL/L Final    POC Potassium 10/06/2019 4.4  3.5 - 4.9 MMOL/L Final    POC Chloride 10/06/2019 100  98 - 109 MMOL/L Final    POC BUN 10/06/2019 18  8 - 26 MMOL/L Final    POC Glucose 10/06/2019 104  70 - 110 MG/DL Final    POC Creatinine 10/06/2019 0.9  0.6 - 1.3 mg/dL Final    POC iCA 10/06/2019 1.16  1.12 - 1.32 MMOL/L Final    POC TCO2 10/06/2019 26  24 - 29 MMOL/L Final    POC Hematocrit 10/06/2019 48* 37 - 47 %PCV Final    POC Hemoglobin 10/06/2019 16.3* 12.5 - 16 g/dL Final    POC Anion Gap 10/06/2019 18  10.0 - 20 MMOL/L Final       Assessment:       1. Nausea    2. Fatigue,  unspecified type        Plan:       Annette was seen today for nausea.    Diagnoses and all orders for this visit:    Nausea  -     ondansetron disintegrating tablet 4 mg  -     POCT Chemistry Panel  -     ondansetron (ZOFRAN-ODT) 4 MG TbDL; Take 1 tablet (4 mg total) by mouth every 8 (eight) hours as needed.    Fatigue, unspecified type  -     ondansetron disintegrating tablet 4 mg  -     POCT Chemistry Panel    nausea/fatigue  -labs  -fluids  -zofran prn    Spent adequate time in obtaining history and explaining differentials    Follow up if symptoms worsen or fail to improve.

## 2019-10-06 NOTE — PATIENT INSTRUCTIONS
"Office Visit on 10/06/2019   Component Date Value Ref Range Status    POC Sodium 10/06/2019 138  138 - 146 MMOL/L Final    POC Potassium 10/06/2019 4.4  3.5 - 4.9 MMOL/L Final    POC Chloride 10/06/2019 100  98 - 109 MMOL/L Final    POC BUN 10/06/2019 18  8 - 26 MMOL/L Final    POC Glucose 10/06/2019 104  70 - 110 MG/DL Final    POC Creatinine 10/06/2019 0.9  0.6 - 1.3 mg/dL Final    POC iCA 10/06/2019 1.16  1.12 - 1.32 MMOL/L Final    POC TCO2 10/06/2019 26  24 - 29 MMOL/L Final    POC Hematocrit 10/06/2019 48* 37 - 47 %PCV Final    POC Hemoglobin 10/06/2019 16.3* 12.5 - 16 g/dL Final    POC Anion Gap 10/06/2019 18  10.0 - 20 MMOL/L Final       Viral Syndrome (Adult)  A viral illness may cause a number of symptoms. The symptoms depend on the part of the body that the virus affects. If it settles in your nose, throat, and lungs, it may cause cough, sore throat, congestion, and sometimes headache. If it settles in your stomach and intestinal tract, it may cause vomiting and diarrhea. Sometimes it causes vague symptoms like "aching all over," feeling tired, loss of appetite, or fever.  A viral illness usually lasts 1 to 2 weeks, but sometimes it lasts longer. In some cases, a more serious infection can look like a viral syndrome in the first few days of the illness. You may need another exam and additional tests to know the difference. Watch for the warning signs listed below.  Home care  Follow these guidelines for taking care of yourself at home:  · If symptoms are severe, rest at home for the first 2 to 3 days.  · Stay away from cigarette smoke - both your smoke and the smoke from others.  · You may use over-the-counter acetaminophen or ibuprofen for fever, muscle aching, and headache, unless another medicine was prescribed for this. If you have chronic liver or kidney disease or ever had a stomach ulcer or GI bleeding, talk with your doctor before using these medicines. No one who is younger than 18 and " ill with a fever should take aspirin. It may cause severe disease or death.  · Your appetite may be poor, so a light diet is fine. Avoid dehydration by drinking 8 to 12 8-ounce glasses of fluids each day. This may include water; orange juice; lemonade; apple, grape, and cranberry juice; clear fruit drinks; electrolyte replacement and sports drinks; and decaffeinated teas and coffee. If you have been diagnosed with a kidney disease, ask your doctor how much and what types of fluids you should drink to prevent dehydration. If you have kidney disease, drinking too much fluid can cause it build up in the your body and be dangerous to your health.  · Over-the-counter remedies won't shorten the length of the illness but may be helpful for cough, sore throat; and nasal and sinus congestion. Don't use decongestants if you have high blood pressure.  Follow-up care  Follow up with your healthcare provider if you do not improve over the next week.  Call 911  Get emergency medical care if any of the following occur:  · Convulsion  · Feeling weak, dizzy, or like you are going to faint  · Chest pain, shortness of breath, wheezing, or difficulty breathing  When to seek medical advice  Call your healthcare provider right away if any of these occur:  · Cough with lots of colored sputum (mucus) or blood in your sputum  · Chest pain, shortness of breath, wheezing, or difficulty breathing  · Severe headache; face, neck, or ear pain  · Severe, constant pain in the lower right side of your belly (abdominal)  · Continued vomiting (cant keep liquids down)  · Frequent diarrhea (more than 5 times a day); blood (red or black color) or mucus in diarrhea  · Feeling weak, dizzy, or like you are going to faint  · Extreme thirst  · Fever of 100.4°F (38°C) or higher, or as directed by your healthcare provider  Date Last Reviewed: 9/25/2015  © 4126-5675 [employer]. [address] [place] [address], [place] [address]. All rights reserved. This  "information is not intended as a substitute for professional medical care. Always follow your healthcare professional's instructions.        Vomiting (Adult)  Vomiting is a common symptom that may be due to different causes. These include gastroenteritis ("stomach flu"), food poisoning and gastritis. There are other more serious causes of vomiting which may be hard to diagnose early in the illness. Therefore, it is important to watch for the warning signs listed below.  The main danger from repeated vomiting is dehydration. This is due to excess loss of water and minerals from the body. When this occurs, body fluids must be replaced.  Home care  · If symptoms are severe, rest at home for the next 24 hours.  · Because your symptoms may be from an infection, wash your hands frequently and well, and use alcohol-based  to avoid spreading the infection to others.  · Wash your hands for at least 20 seconds. Hum the happy birthday song twice for the correct length of time.  · Wash your hands after using the toilet, before and after preparing food, before eating food, after changing a diaper, cleaning a wound, caring for a sick person, and blowing your nose, coughing, or sneezing. You should also wash your hands after caring for someone who is sick, touching pet food, or treats, and touching an animal, or animal waste.  · You may use acetaminophen or NSAID medicines like ibuprofen or naproxen to control fever, unless another medicine was prescribed. If you have chronic liver or kidney disease or ever had a stomach ulcer or GI bleeding, talk with your doctor before using these medicines. Aspirin should never be used in anyone under 18 years of age who is ill with a fever. It may cause severe liver damage. Don't use NSAID medicines if you are already taking one for another condition (like arthritis) or are on aspirin (such as for heart disease, or after a stroke)  · Avoid tobacco and alcohol use, which may worsen " your symptoms.  · If medicines for vomiting were prescribed, take as directed.  · Once vomiting stops, then follow these guidelines:  During the first 12 to 24 hours follow the diet below:  · Fruit juices. Apple, grape juice, clear fruit drinks, and electrolyte replacement drinks.  · Beverages. Soft drinks without caffeine; mineral water (plain or flavored), decaffeinated tea and coffee.  · Soups. Clear broth, consommé and bouillon  · Desserts. Plain gelatin, popsicles and fruit juice bars. As you feel better, you may add 6-8 ounces of yogurt per day.  During the next 24 hours you may add the following to the above:  · Hot cereal, plain toast, bread, rolls, crackers  · Plain noodles, rice, mashed potatoes, chicken noodle or rice soup  · Unsweetened canned fruit (avoid pineapple), bananas  · Limit caffeine and chocolate. No spices or seasonings except salt.  During the next 24 hours:  Gradually resume a normal diet, as you feel better and your symptoms lessen.  Follow-up care  Follow up with your healthcare provider, or as advised.  When to seek medical advice  Call your healthcare provider right away if any of these occur:  · Constant right-sided lower abdominal pain or increasing general abdominal pain  · Continued vomiting (unable to keep liquids down) for 24 hours  · Frequent diarrhea (more than 5 times a day); blood (red or black color) or mucus in diarrhea  · Reduced urine output or extreme thirst  · Weakness, dizziness or fainting  · Unusually drowsy or confused  · Fever of 100.4°F (38°C) oral or higher, or as directed  · Yellow color of the eyes or skin  Date Last Reviewed: 11/16/2015 © 2000-2017 [employer]. [address] [place] [address], [place] [address]. All rights reserved. This information is not intended as a substitute for professional medical care. Always follow your healthcare professional's instructions.

## 2019-10-09 ENCOUNTER — OFFICE VISIT (OUTPATIENT)
Dept: URGENT CARE | Facility: CLINIC | Age: 79
End: 2019-10-09
Payer: MEDICARE

## 2019-10-09 ENCOUNTER — LAB VISIT (OUTPATIENT)
Dept: LAB | Facility: OTHER | Age: 79
End: 2019-10-09
Attending: INTERNAL MEDICINE
Payer: MEDICARE

## 2019-10-09 VITALS
OXYGEN SATURATION: 99 % | DIASTOLIC BLOOD PRESSURE: 90 MMHG | RESPIRATION RATE: 20 BRPM | WEIGHT: 187 LBS | BODY MASS INDEX: 34.41 KG/M2 | HEART RATE: 75 BPM | TEMPERATURE: 98 F | HEIGHT: 62 IN | SYSTOLIC BLOOD PRESSURE: 154 MMHG

## 2019-10-09 DIAGNOSIS — R19.5 OCCULT BLOOD IN STOOLS: ICD-10-CM

## 2019-10-09 DIAGNOSIS — K92.1 MELENA: Primary | ICD-10-CM

## 2019-10-09 DIAGNOSIS — R19.7 DIARRHEA: Primary | ICD-10-CM

## 2019-10-09 DIAGNOSIS — R19.7 DIARRHEA: ICD-10-CM

## 2019-10-09 LAB
ALBUMIN SERPL BCP-MCNC: 4.3 G/DL (ref 3.5–5.2)
ALP SERPL-CCNC: 71 U/L (ref 55–135)
ALT SERPL W/O P-5'-P-CCNC: 21 U/L (ref 10–44)
AST SERPL-CCNC: 20 U/L (ref 10–40)
BILIRUB DIRECT SERPL-MCNC: 0.2 MG/DL (ref 0.1–0.3)
BILIRUB SERPL-MCNC: 0.5 MG/DL (ref 0.1–1)
CTP QC/QA: YES
FECAL OCCULT BLOOD, POC: POSITIVE
GLUCOSE SERPL-MCNC: 102 MG/DL (ref 70–110)
POC ANION GAP: 17 MMOL/L (ref 10–20)
POC BUN: 18 MMOL/L (ref 8–26)
POC CHLORIDE: 101 MMOL/L (ref 98–109)
POC CREATININE: 0.8 MG/DL (ref 0.6–1.3)
POC HEMATOCRIT: 49 %PCV (ref 37–47)
POC HEMOGLOBIN: 16.7 G/DL (ref 12.5–16)
POC ICA: 1.18 MMOL/L (ref 1.12–1.32)
POC POTASSIUM: 4.6 MMOL/L (ref 3.5–4.9)
POC SODIUM: 140 MMOL/L (ref 138–146)
POC TCO2: 28 MMOL/L (ref 24–29)
PROT SERPL-MCNC: 7.8 G/DL (ref 6–8.4)
TSH SERPL DL<=0.005 MIU/L-ACNC: 0.54 UIU/ML (ref 0.4–4)

## 2019-10-09 PROCEDURE — 99214 OFFICE O/P EST MOD 30 MIN: CPT | Mod: S$GLB,,, | Performed by: FAMILY MEDICINE

## 2019-10-09 PROCEDURE — 36415 COLL VENOUS BLD VENIPUNCTURE: CPT

## 2019-10-09 PROCEDURE — 99214 PR OFFICE/OUTPT VISIT, EST, LEVL IV, 30-39 MIN: ICD-10-PCS | Mod: S$GLB,,, | Performed by: FAMILY MEDICINE

## 2019-10-09 PROCEDURE — 80047 POCT CHEMISTRY PANEL: ICD-10-PCS | Mod: QW,S$GLB,, | Performed by: FAMILY MEDICINE

## 2019-10-09 PROCEDURE — 80076 HEPATIC FUNCTION PANEL: CPT

## 2019-10-09 PROCEDURE — 80047 BASIC METABLC PNL IONIZED CA: CPT | Mod: QW,S$GLB,, | Performed by: FAMILY MEDICINE

## 2019-10-09 PROCEDURE — 84443 ASSAY THYROID STIM HORMONE: CPT

## 2019-10-09 PROCEDURE — 82270 POCT OCCULT BLOOD STOOL: ICD-10-PCS | Mod: S$GLB,,, | Performed by: FAMILY MEDICINE

## 2019-10-09 PROCEDURE — 82270 OCCULT BLOOD FECES: CPT | Mod: S$GLB,,, | Performed by: FAMILY MEDICINE

## 2019-10-09 RX ORDER — TRIMETHOBENZAMIDE HCL 300 MG
300 CAPSULE ORAL EVERY 8 HOURS PRN
COMMUNITY
End: 2021-10-02 | Stop reason: SDUPTHER

## 2019-10-09 NOTE — PROGRESS NOTES
"Subjective:       Patient ID: Annette Lynch is a 78 y.o. female.    Vitals:  height is 5' 2" (1.575 m) and weight is 84.8 kg (187 lb). Her temperature is 97.8 °F (36.6 °C). Her blood pressure is 154/90 (abnormal) and her pulse is 75. Her respiration is 20 and oxygen saturation is 99%.     Chief Complaint: Headache and Fatigue    Pt states she was here a few days ago and not feeling much better - reports this morning slightly better than she has been feeling the past few days. Pt says she's been having headaches and feeling light headed and also zofran is causing constipation. Feeling weak no loc. No fever, cough sore throat chest pain shortness of breath dysuria hematuria.  Reports yesterday and today her stool has looked black no diarrhea.  She did take Pepto-Bismol although 5 days ago and had normal color stool since then.    Headache    This is a recurrent problem. The current episode started 1 to 4 weeks ago. The problem occurs constantly. The problem has been unchanged. The pain does not radiate. The quality of the pain is described as aching. Associated symptoms include nausea. Pertinent negatives include no blurred vision, coughing, dizziness, fever, sore throat, vomiting or weakness. Treatments tried: Rx meds.   Fatigue   Associated symptoms include fatigue, headaches and nausea. Pertinent negatives include no arthralgias, chest pain, chills, congestion, coughing, fever, joint swelling, myalgias, rash, sore throat, vertigo, vomiting or weakness.       Constitution: Positive for fatigue and generalized weakness. Negative for chills and fever.   HENT: Negative for congestion and sore throat.    Neck: Negative for painful lymph nodes.   Cardiovascular: Negative for chest pain and leg swelling.   Eyes: Negative for double vision and blurred vision.   Respiratory: Negative for cough and shortness of breath.    Gastrointestinal: Positive for nausea and dark colored stools. Negative for vomiting and diarrhea. "   Genitourinary: Negative for dysuria, frequency, urgency and history of kidney stones.   Musculoskeletal: Negative for joint pain, joint swelling, muscle cramps and muscle ache.   Skin: Negative for color change, pale, rash and bruising.   Allergic/Immunologic: Negative for seasonal allergies.   Neurological: Positive for headaches. Negative for dizziness, history of vertigo, light-headedness and passing out.   Hematologic/Lymphatic: Negative for swollen lymph nodes.   Psychiatric/Behavioral: Negative for nervous/anxious, sleep disturbance and depression. The patient is not nervous/anxious.        Objective:      Physical Exam   Constitutional: She is oriented to person, place, and time. She appears well-developed and well-nourished. She is cooperative.  Non-toxic appearance. She does not appear ill. No distress.   HENT:   Head: Normocephalic and atraumatic.   Right Ear: Hearing, tympanic membrane, external ear and ear canal normal. No middle ear effusion.   Left Ear: Hearing, tympanic membrane, external ear and ear canal normal.  No middle ear effusion.   Nose: Nose normal. No mucosal edema, rhinorrhea or nasal deformity. No epistaxis. Right sinus exhibits no maxillary sinus tenderness and no frontal sinus tenderness. Left sinus exhibits no maxillary sinus tenderness and no frontal sinus tenderness.   Mouth/Throat: Uvula is midline, oropharynx is clear and moist and mucous membranes are normal. No trismus in the jaw. Normal dentition. No uvula swelling. No posterior oropharyngeal erythema.   Eyes: Conjunctivae and lids are normal. Right eye exhibits no discharge. Left eye exhibits no discharge. No scleral icterus.   Neck: Trachea normal, normal range of motion, full passive range of motion without pain and phonation normal. Neck supple.   Cardiovascular: Normal rate, regular rhythm, normal heart sounds, intact distal pulses and normal pulses.   No murmur heard.  Pulses:       Radial pulses are 2+ on the right  side, and 2+ on the left side.   Pulmonary/Chest: Effort normal and breath sounds normal. No respiratory distress. She has no decreased breath sounds. She has no wheezes. She has no rhonchi. She has no rales.   Abdominal: Soft. Normal appearance and bowel sounds are normal. She exhibits no distension, no pulsatile midline mass and no mass. There is no hepatosplenomegaly. There is no tenderness. There is no rigidity, no rebound, no guarding, no CVA tenderness, no tenderness at McBurney's point and negative Hitchcock's sign.   Genitourinary: Rectal exam shows guaiac positive stool.   Musculoskeletal: Normal range of motion. She exhibits no edema or deformity.   Neurological: She is alert and oriented to person, place, and time. She has normal strength. No cranial nerve deficit or sensory deficit. She exhibits normal muscle tone. She displays a negative Romberg sign. Coordination and gait normal. GCS eye subscore is 4. GCS verbal subscore is 5. GCS motor subscore is 6.   Normal finger to nose, normal EOM, no nystagmus, normal sensation to face and extremities, CN intact,  strength equal bilateral, extremity strength equal bilaterally, no pronator drift     Skin: Skin is warm, dry, intact, not diaphoretic and not pale.   Psychiatric: She has a normal mood and affect. Her speech is normal and behavior is normal. Judgment and thought content normal. Cognition and memory are normal.   Nursing note and vitals reviewed.    Results for orders placed or performed in visit on 10/09/19   POCT Chemistry Panel   Result Value Ref Range    POC Sodium 140 138 - 146 MMOL/L    POC Potassium 4.6 3.5 - 4.9 MMOL/L    POC Chloride 101 98 - 109 MMOL/L    POC BUN 18 8 - 26 MMOL/L    POC Glucose 102 70 - 110 MG/DL    POC Creatinine 0.8 0.6 - 1.3 mg/dL    POC iCA 1.18 1.12 - 1.32 MMOL/L    POC TCO2 28 24 - 29 MMOL/L    POC Hematocrit 49 (A) 37 - 47 %PCV    POC Hemoglobin 16.7 (A) 12.5 - 16 g/dL    POC Anion Gap 17 10.0 - 20 MMOL/L   POCT  occult blood stool   Result Value Ref Range    Fecal Occult Blood Positive (A) Negative     Acceptable Yes            Assessment:       1. Melena        Plan:         Melena  -     POCT Chemistry Panel  -     POCT occult blood stool     After the Hemoccult was positive I advised patient tried to call her GI doctor with [facility] if she can get an appointment today, we were drawing her blood (I was in another with the patient) she inform staff that she needed to leave and get to [facility] quickly to be seen, left without me re-examining patient or informing her of her chem8 results.       Spoke with pt later in the day to give results and she is having more blood work done with GI

## 2019-11-11 ENCOUNTER — LAB VISIT (OUTPATIENT)
Dept: LAB | Facility: HOSPITAL | Age: 79
End: 2019-11-11
Attending: INTERNAL MEDICINE
Payer: MEDICARE

## 2019-11-11 DIAGNOSIS — R19.7 DIARRHEA, UNSPECIFIED TYPE: ICD-10-CM

## 2019-11-11 LAB
OB PNL STL: NEGATIVE
WBC #/AREA STL HPF: NORMAL /[HPF]

## 2019-11-11 PROCEDURE — 82272 OCCULT BLD FECES 1-3 TESTS: CPT

## 2019-11-11 PROCEDURE — 87427 SHIGA-LIKE TOXIN AG IA: CPT

## 2019-11-11 PROCEDURE — 87328 CRYPTOSPORIDIUM AG IA: CPT

## 2019-11-11 PROCEDURE — 87045 FECES CULTURE AEROBIC BACT: CPT | Mod: 59

## 2019-11-11 PROCEDURE — 89055 LEUKOCYTE ASSESSMENT FECAL: CPT

## 2019-11-11 PROCEDURE — 87209 SMEAR COMPLEX STAIN: CPT

## 2019-11-11 PROCEDURE — 87046 STOOL CULTR AEROBIC BACT EA: CPT | Mod: 59

## 2019-11-12 LAB
CRYPTOSP AG STL QL IA: NEGATIVE
E COLI SXT1 STL QL IA: NEGATIVE
E COLI SXT2 STL QL IA: NEGATIVE
G LAMBLIA AG STL QL IA: NEGATIVE
O+P STL TRI STN: NORMAL

## 2019-11-13 LAB
E COLI SXT1 STL QL IA: NEGATIVE
E COLI SXT2 STL QL IA: NEGATIVE

## 2019-11-14 LAB — BACTERIA STL CULT: NORMAL

## 2019-11-15 LAB — BACTERIA STL CULT: NORMAL

## 2019-12-17 ENCOUNTER — HOSPITAL ENCOUNTER (OUTPATIENT)
Dept: RADIOLOGY | Facility: OTHER | Age: 79
Discharge: HOME | End: 2019-12-17
Attending: INTERNAL MEDICINE
Payer: MEDICARE

## 2019-12-17 DIAGNOSIS — Z12.31 SCREENING MAMMOGRAM, ENCOUNTER FOR: ICD-10-CM

## 2019-12-17 DIAGNOSIS — Z78.0 POSTMENOPAUSAL: ICD-10-CM

## 2019-12-17 PROCEDURE — 77080 DEXA BONE DENSITY SPINE HIP: ICD-10-PCS | Mod: 26,,, | Performed by: RADIOLOGY

## 2019-12-17 PROCEDURE — 77067 SCR MAMMO BI INCL CAD: CPT | Mod: 26,,, | Performed by: RADIOLOGY

## 2019-12-17 PROCEDURE — 77080 DXA BONE DENSITY AXIAL: CPT | Mod: TC

## 2019-12-17 PROCEDURE — 77067 SCR MAMMO BI INCL CAD: CPT | Mod: TC

## 2019-12-17 PROCEDURE — 77063 BREAST TOMOSYNTHESIS BI: CPT | Mod: 26,,, | Performed by: RADIOLOGY

## 2019-12-17 PROCEDURE — 77080 DXA BONE DENSITY AXIAL: CPT | Mod: 26,,, | Performed by: RADIOLOGY

## 2019-12-17 PROCEDURE — 77067 MAMMO DIGITAL SCREENING BILAT WITH TOMOSYNTHESIS_CAD: ICD-10-PCS | Mod: 26,,, | Performed by: RADIOLOGY

## 2019-12-17 PROCEDURE — 77063 MAMMO DIGITAL SCREENING BILAT WITH TOMOSYNTHESIS_CAD: ICD-10-PCS | Mod: 26,,, | Performed by: RADIOLOGY

## 2020-01-07 PROBLEM — K44.9 HIATAL HERNIA: Status: ACTIVE | Noted: 2020-01-07

## 2020-01-07 PROBLEM — F32.A DEPRESSION: Status: ACTIVE | Noted: 2020-01-07

## 2020-01-07 PROBLEM — R73.9 ELEVATED BLOOD SUGAR: Status: ACTIVE | Noted: 2020-01-07

## 2020-01-10 PROBLEM — M81.0 AGE-RELATED OSTEOPOROSIS WITHOUT CURRENT PATHOLOGICAL FRACTURE: Status: ACTIVE | Noted: 2020-01-10

## 2020-04-27 ENCOUNTER — LAB VISIT (OUTPATIENT)
Dept: INTERNAL MEDICINE | Facility: CLINIC | Age: 80
End: 2020-04-27
Payer: MEDICARE

## 2020-04-27 DIAGNOSIS — M62.81 MUSCLE WEAKNESS: ICD-10-CM

## 2020-04-27 DIAGNOSIS — R09.89 CHEST CONGESTION: ICD-10-CM

## 2020-04-27 DIAGNOSIS — R68.83 CHILLS: ICD-10-CM

## 2020-04-27 DIAGNOSIS — R05.9 COUGH: ICD-10-CM

## 2020-04-27 LAB — SARS-COV-2 RNA RESP QL NAA+PROBE: NOT DETECTED

## 2020-04-27 PROCEDURE — U0002 COVID-19 LAB TEST NON-CDC: HCPCS

## 2020-05-04 ENCOUNTER — LAB VISIT (OUTPATIENT)
Dept: LAB | Facility: HOSPITAL | Age: 80
End: 2020-05-04
Payer: MEDICARE

## 2020-05-04 DIAGNOSIS — R11.0 NAUSEA: ICD-10-CM

## 2020-05-04 DIAGNOSIS — M62.81 MUSCLE WEAKNESS: ICD-10-CM

## 2020-05-04 DIAGNOSIS — R53.83 LETHARGIC: ICD-10-CM

## 2020-05-04 LAB
ALBUMIN SERPL BCP-MCNC: 4.1 G/DL (ref 3.5–5.2)
ALP SERPL-CCNC: 73 U/L (ref 55–135)
ALT SERPL W/O P-5'-P-CCNC: 23 U/L (ref 10–44)
AMYLASE SERPL-CCNC: 47 U/L (ref 20–110)
ANION GAP SERPL CALC-SCNC: 11 MMOL/L (ref 8–16)
AST SERPL-CCNC: 26 U/L (ref 10–40)
BASOPHILS # BLD AUTO: 0.04 K/UL (ref 0–0.2)
BASOPHILS NFR BLD: 0.4 % (ref 0–1.9)
BILIRUB SERPL-MCNC: 0.3 MG/DL (ref 0.1–1)
BUN SERPL-MCNC: 15 MG/DL (ref 8–23)
CALCIUM SERPL-MCNC: 9.7 MG/DL (ref 8.7–10.5)
CHLORIDE SERPL-SCNC: 104 MMOL/L (ref 95–110)
CO2 SERPL-SCNC: 25 MMOL/L (ref 23–29)
CREAT SERPL-MCNC: 0.7 MG/DL (ref 0.5–1.4)
DIFFERENTIAL METHOD: ABNORMAL
EOSINOPHIL # BLD AUTO: 0.2 K/UL (ref 0–0.5)
EOSINOPHIL NFR BLD: 2.2 % (ref 0–8)
ERYTHROCYTE [DISTWIDTH] IN BLOOD BY AUTOMATED COUNT: 14.6 % (ref 11.5–14.5)
EST. GFR  (AFRICAN AMERICAN): >60 ML/MIN/1.73 M^2
EST. GFR  (NON AFRICAN AMERICAN): >60 ML/MIN/1.73 M^2
GLUCOSE SERPL-MCNC: 81 MG/DL (ref 70–110)
HCT VFR BLD AUTO: 51.2 % (ref 37–48.5)
HGB BLD-MCNC: 15.8 G/DL (ref 12–16)
IMM GRANULOCYTES # BLD AUTO: 0.03 K/UL (ref 0–0.04)
IMM GRANULOCYTES NFR BLD AUTO: 0.3 % (ref 0–0.5)
LIPASE SERPL-CCNC: 18 U/L (ref 4–60)
LYMPHOCYTES # BLD AUTO: 3.5 K/UL (ref 1–4.8)
LYMPHOCYTES NFR BLD: 36.4 % (ref 18–48)
MCH RBC QN AUTO: 30.9 PG (ref 27–31)
MCHC RBC AUTO-ENTMCNC: 30.9 G/DL (ref 32–36)
MCV RBC AUTO: 100 FL (ref 82–98)
MONOCYTES # BLD AUTO: 0.5 K/UL (ref 0.3–1)
MONOCYTES NFR BLD: 5.3 % (ref 4–15)
NEUTROPHILS # BLD AUTO: 5.4 K/UL (ref 1.8–7.7)
NEUTROPHILS NFR BLD: 55.4 % (ref 38–73)
NRBC BLD-RTO: 0 /100 WBC
PLATELET # BLD AUTO: 338 K/UL (ref 150–350)
PMV BLD AUTO: 10.8 FL (ref 9.2–12.9)
POTASSIUM SERPL-SCNC: 4.4 MMOL/L (ref 3.5–5.1)
PROT SERPL-MCNC: 8.1 G/DL (ref 6–8.4)
RBC # BLD AUTO: 5.12 M/UL (ref 4–5.4)
SODIUM SERPL-SCNC: 140 MMOL/L (ref 136–145)
TSH SERPL DL<=0.005 MIU/L-ACNC: 0.79 UIU/ML (ref 0.4–4)
WBC # BLD AUTO: 9.72 K/UL (ref 3.9–12.7)

## 2020-05-04 PROCEDURE — 85025 COMPLETE CBC W/AUTO DIFF WBC: CPT

## 2020-05-04 PROCEDURE — 84443 ASSAY THYROID STIM HORMONE: CPT

## 2020-05-04 PROCEDURE — 82150 ASSAY OF AMYLASE: CPT

## 2020-05-04 PROCEDURE — 83690 ASSAY OF LIPASE: CPT

## 2020-05-04 PROCEDURE — 36415 COLL VENOUS BLD VENIPUNCTURE: CPT | Mod: PN

## 2020-05-04 PROCEDURE — 80053 COMPREHEN METABOLIC PANEL: CPT

## 2020-07-01 ENCOUNTER — HOSPITAL ENCOUNTER (OUTPATIENT)
Dept: RADIOLOGY | Facility: OTHER | Age: 80
Discharge: HOME | End: 2020-07-01
Attending: INTERNAL MEDICINE
Payer: MEDICARE

## 2020-07-01 DIAGNOSIS — R10.13 EPIGASTRIC PAIN: ICD-10-CM

## 2020-07-01 PROCEDURE — 76700 US EXAM ABDOM COMPLETE: CPT | Mod: TC

## 2020-07-01 PROCEDURE — 76700 US EXAM ABDOM COMPLETE: CPT | Mod: 26,,, | Performed by: RADIOLOGY

## 2020-07-01 PROCEDURE — 76700 US ABDOMEN COMPLETE: ICD-10-PCS | Mod: 26,,, | Performed by: RADIOLOGY

## 2020-07-13 ENCOUNTER — HOSPITAL ENCOUNTER (OUTPATIENT)
Dept: RADIOLOGY | Facility: OTHER | Age: 80
Discharge: HOME | End: 2020-07-13
Attending: INTERNAL MEDICINE
Payer: MEDICARE

## 2020-07-13 DIAGNOSIS — K80.20 CALCULUS OF GALLBLADDER WITHOUT CHOLECYSTITIS WITHOUT OBSTRUCTION: ICD-10-CM

## 2020-07-13 PROCEDURE — 78226 NM HEPATOBILIARY IMAGING INC GB (HIDA): ICD-10-PCS | Mod: 26,,, | Performed by: RADIOLOGY

## 2020-07-13 PROCEDURE — 78226 HEPATOBILIARY SYSTEM IMAGING: CPT | Mod: 26,,, | Performed by: RADIOLOGY

## 2020-07-13 PROCEDURE — A9537 TC99M MEBROFENIN: HCPCS

## 2020-08-29 ENCOUNTER — HOSPITAL ENCOUNTER (OUTPATIENT)
Facility: OTHER | Age: 80
Discharge: HOME | End: 2020-08-31
Attending: EMERGENCY MEDICINE | Admitting: INTERNAL MEDICINE
Payer: MEDICARE

## 2020-08-29 DIAGNOSIS — R07.9 CHEST PAIN: Primary | ICD-10-CM

## 2020-08-29 DIAGNOSIS — I10 ESSENTIAL HYPERTENSION: ICD-10-CM

## 2020-08-29 LAB
ALBUMIN SERPL BCP-MCNC: 4.1 G/DL (ref 3.5–5.2)
ALP SERPL-CCNC: 75 U/L (ref 55–135)
ALT SERPL W/O P-5'-P-CCNC: 18 U/L (ref 10–44)
ANION GAP SERPL CALC-SCNC: 11 MMOL/L (ref 8–16)
AST SERPL-CCNC: 23 U/L (ref 10–40)
BASOPHILS # BLD AUTO: 0.04 K/UL (ref 0–0.2)
BASOPHILS NFR BLD: 0.6 % (ref 0–1.9)
BILIRUB SERPL-MCNC: 0.3 MG/DL (ref 0.1–1)
BNP SERPL-MCNC: 30 PG/ML (ref 0–99)
BUN SERPL-MCNC: 21 MG/DL (ref 8–23)
CALCIUM SERPL-MCNC: 9.3 MG/DL (ref 8.7–10.5)
CHLORIDE SERPL-SCNC: 107 MMOL/L (ref 95–110)
CO2 SERPL-SCNC: 23 MMOL/L (ref 23–29)
CREAT SERPL-MCNC: 0.8 MG/DL (ref 0.5–1.4)
DIFFERENTIAL METHOD: ABNORMAL
EOSINOPHIL # BLD AUTO: 0.3 K/UL (ref 0–0.5)
EOSINOPHIL NFR BLD: 4.3 % (ref 0–8)
ERYTHROCYTE [DISTWIDTH] IN BLOOD BY AUTOMATED COUNT: 13.2 % (ref 11.5–14.5)
EST. GFR  (AFRICAN AMERICAN): >60 ML/MIN/1.73 M^2
EST. GFR  (NON AFRICAN AMERICAN): >60 ML/MIN/1.73 M^2
GLUCOSE SERPL-MCNC: 110 MG/DL (ref 70–110)
HCT VFR BLD AUTO: 45.9 % (ref 37–48.5)
HGB BLD-MCNC: 15.4 G/DL (ref 12–16)
IMM GRANULOCYTES # BLD AUTO: 0.01 K/UL (ref 0–0.04)
IMM GRANULOCYTES NFR BLD AUTO: 0.1 % (ref 0–0.5)
LYMPHOCYTES # BLD AUTO: 2.7 K/UL (ref 1–4.8)
LYMPHOCYTES NFR BLD: 37.9 % (ref 18–48)
MCH RBC QN AUTO: 31.8 PG (ref 27–31)
MCHC RBC AUTO-ENTMCNC: 33.6 G/DL (ref 32–36)
MCV RBC AUTO: 95 FL (ref 82–98)
MONOCYTES # BLD AUTO: 0.5 K/UL (ref 0.3–1)
MONOCYTES NFR BLD: 6.9 % (ref 4–15)
NEUTROPHILS # BLD AUTO: 3.6 K/UL (ref 1.8–7.7)
NEUTROPHILS NFR BLD: 50.2 % (ref 38–73)
NRBC BLD-RTO: 0 /100 WBC
PLATELET # BLD AUTO: 241 K/UL (ref 150–350)
PMV BLD AUTO: 10.4 FL (ref 9.2–12.9)
POTASSIUM SERPL-SCNC: 4 MMOL/L (ref 3.5–5.1)
PROT SERPL-MCNC: 7.5 G/DL (ref 6–8.4)
RBC # BLD AUTO: 4.84 M/UL (ref 4–5.4)
SARS-COV-2 RDRP RESP QL NAA+PROBE: NEGATIVE
SODIUM SERPL-SCNC: 141 MMOL/L (ref 136–145)
TROPONIN I SERPL DL<=0.01 NG/ML-MCNC: <0.006 NG/ML (ref 0–0.03)
WBC # BLD AUTO: 7.23 K/UL (ref 3.9–12.7)

## 2020-08-29 PROCEDURE — 85025 COMPLETE CBC W/AUTO DIFF WBC: CPT

## 2020-08-29 PROCEDURE — 36415 COLL VENOUS BLD VENIPUNCTURE: CPT | Mod: 59

## 2020-08-29 PROCEDURE — 93010 EKG 12-LEAD: ICD-10-PCS | Mod: 76,,, | Performed by: INTERNAL MEDICINE

## 2020-08-29 PROCEDURE — G0378 HOSPITAL OBSERVATION PER HR: HCPCS

## 2020-08-29 PROCEDURE — 80053 COMPREHEN METABOLIC PANEL: CPT

## 2020-08-29 PROCEDURE — 83880 ASSAY OF NATRIURETIC PEPTIDE: CPT

## 2020-08-29 PROCEDURE — C9113 INJ PANTOPRAZOLE SODIUM, VIA: HCPCS | Performed by: EMERGENCY MEDICINE

## 2020-08-29 PROCEDURE — G0378 HOSPITAL OBSERVATION PER HR: HCPCS | Mod: CS

## 2020-08-29 PROCEDURE — 93010 ELECTROCARDIOGRAM REPORT: CPT | Mod: ,,, | Performed by: INTERNAL MEDICINE

## 2020-08-29 PROCEDURE — 96374 THER/PROPH/DIAG INJ IV PUSH: CPT

## 2020-08-29 PROCEDURE — 93005 ELECTROCARDIOGRAM TRACING: CPT

## 2020-08-29 PROCEDURE — 25000242 PHARM REV CODE 250 ALT 637 W/ HCPCS: Performed by: EMERGENCY MEDICINE

## 2020-08-29 PROCEDURE — 99291 CRITICAL CARE FIRST HOUR: CPT

## 2020-08-29 PROCEDURE — 84484 ASSAY OF TROPONIN QUANT: CPT

## 2020-08-29 PROCEDURE — 63600175 PHARM REV CODE 636 W HCPCS: Performed by: EMERGENCY MEDICINE

## 2020-08-29 PROCEDURE — 25000003 PHARM REV CODE 250: Performed by: INTERNAL MEDICINE

## 2020-08-29 PROCEDURE — U0002 COVID-19 LAB TEST NON-CDC: HCPCS

## 2020-08-29 PROCEDURE — 25000003 PHARM REV CODE 250: Performed by: EMERGENCY MEDICINE

## 2020-08-29 RX ORDER — NITROGLYCERIN 0.4 MG/1
0.4 TABLET SUBLINGUAL
Status: COMPLETED | OUTPATIENT
Start: 2020-08-29 | End: 2020-08-29

## 2020-08-29 RX ORDER — PANTOPRAZOLE SODIUM 40 MG/10ML
40 INJECTION, POWDER, LYOPHILIZED, FOR SOLUTION INTRAVENOUS
Status: COMPLETED | OUTPATIENT
Start: 2020-08-29 | End: 2020-08-29

## 2020-08-29 RX ORDER — SODIUM CHLORIDE 0.9 % (FLUSH) 0.9 %
10 SYRINGE (ML) INJECTION
Status: DISCONTINUED | OUTPATIENT
Start: 2020-08-29 | End: 2020-08-31 | Stop reason: HOSPADM

## 2020-08-29 RX ORDER — METOPROLOL SUCCINATE 50 MG/1
50 TABLET, EXTENDED RELEASE ORAL DAILY
Status: DISCONTINUED | OUTPATIENT
Start: 2020-08-30 | End: 2020-08-29

## 2020-08-29 RX ORDER — NITROGLYCERIN 40 MG/1
1 PATCH TRANSDERMAL DAILY
Status: DISCONTINUED | OUTPATIENT
Start: 2020-08-29 | End: 2020-08-30

## 2020-08-29 RX ORDER — AMLODIPINE BESYLATE 5 MG/1
5 TABLET ORAL ONCE
Status: COMPLETED | OUTPATIENT
Start: 2020-08-29 | End: 2020-08-29

## 2020-08-29 RX ORDER — METOPROLOL SUCCINATE 25 MG/1
25 TABLET, EXTENDED RELEASE ORAL
Status: COMPLETED | OUTPATIENT
Start: 2020-08-29 | End: 2020-08-29

## 2020-08-29 RX ADMIN — AMLODIPINE BESYLATE 5 MG: 5 TABLET ORAL at 11:08

## 2020-08-29 RX ADMIN — NITROGLYCERIN TRANSDERMAL SYSTEM 1 PATCH: 0.2 PATCH, EXTENDED RELEASE TRANSDERMAL at 11:08

## 2020-08-29 RX ADMIN — PANTOPRAZOLE SODIUM 40 MG: 40 INJECTION, POWDER, LYOPHILIZED, FOR SOLUTION INTRAVENOUS at 09:08

## 2020-08-29 RX ADMIN — NITROGLYCERIN 0.4 MG: 0.4 TABLET, ORALLY DISINTEGRATING SUBLINGUAL at 08:08

## 2020-08-29 RX ADMIN — METOPROLOL SUCCINATE 25 MG: 25 TABLET, EXTENDED RELEASE ORAL at 08:08

## 2020-08-30 LAB
ALBUMIN SERPL BCP-MCNC: 3.7 G/DL (ref 3.5–5.2)
ALP SERPL-CCNC: 64 U/L (ref 55–135)
ALT SERPL W/O P-5'-P-CCNC: 20 U/L (ref 10–44)
ANION GAP SERPL CALC-SCNC: 12 MMOL/L (ref 8–16)
AST SERPL-CCNC: 19 U/L (ref 10–40)
BASOPHILS # BLD AUTO: 0.05 K/UL (ref 0–0.2)
BASOPHILS NFR BLD: 0.6 % (ref 0–1.9)
BILIRUB SERPL-MCNC: 0.4 MG/DL (ref 0.1–1)
BUN SERPL-MCNC: 18 MG/DL (ref 8–23)
CALCIUM SERPL-MCNC: 8.9 MG/DL (ref 8.7–10.5)
CHLORIDE SERPL-SCNC: 107 MMOL/L (ref 95–110)
CO2 SERPL-SCNC: 22 MMOL/L (ref 23–29)
CREAT SERPL-MCNC: 0.7 MG/DL (ref 0.5–1.4)
DIFFERENTIAL METHOD: NORMAL
EOSINOPHIL # BLD AUTO: 0.3 K/UL (ref 0–0.5)
EOSINOPHIL NFR BLD: 3.5 % (ref 0–8)
ERYTHROCYTE [DISTWIDTH] IN BLOOD BY AUTOMATED COUNT: 13.2 % (ref 11.5–14.5)
EST. GFR  (AFRICAN AMERICAN): >60 ML/MIN/1.73 M^2
EST. GFR  (NON AFRICAN AMERICAN): >60 ML/MIN/1.73 M^2
GLUCOSE SERPL-MCNC: 104 MG/DL (ref 70–110)
HCT VFR BLD AUTO: 42.6 % (ref 37–48.5)
HGB BLD-MCNC: 13.8 G/DL (ref 12–16)
IMM GRANULOCYTES # BLD AUTO: 0.02 K/UL (ref 0–0.04)
IMM GRANULOCYTES NFR BLD AUTO: 0.3 % (ref 0–0.5)
LYMPHOCYTES # BLD AUTO: 2.4 K/UL (ref 1–4.8)
LYMPHOCYTES NFR BLD: 31.7 % (ref 18–48)
MCH RBC QN AUTO: 30.6 PG (ref 27–31)
MCHC RBC AUTO-ENTMCNC: 32.4 G/DL (ref 32–36)
MCV RBC AUTO: 95 FL (ref 82–98)
MONOCYTES # BLD AUTO: 0.5 K/UL (ref 0.3–1)
MONOCYTES NFR BLD: 7 % (ref 4–15)
NEUTROPHILS # BLD AUTO: 4.4 K/UL (ref 1.8–7.7)
NEUTROPHILS NFR BLD: 56.9 % (ref 38–73)
NRBC BLD-RTO: 0 /100 WBC
PLATELET # BLD AUTO: 251 K/UL (ref 150–350)
PMV BLD AUTO: 10.2 FL (ref 9.2–12.9)
POTASSIUM SERPL-SCNC: 4.1 MMOL/L (ref 3.5–5.1)
PROT SERPL-MCNC: 6.7 G/DL (ref 6–8.4)
RBC # BLD AUTO: 4.51 M/UL (ref 4–5.4)
SODIUM SERPL-SCNC: 141 MMOL/L (ref 136–145)
TROPONIN I SERPL DL<=0.01 NG/ML-MCNC: <0.006 NG/ML (ref 0–0.03)
WBC # BLD AUTO: 7.7 K/UL (ref 3.9–12.7)

## 2020-08-30 PROCEDURE — 25000003 PHARM REV CODE 250: Performed by: INTERNAL MEDICINE

## 2020-08-30 PROCEDURE — G0378 HOSPITAL OBSERVATION PER HR: HCPCS | Mod: CS

## 2020-08-30 PROCEDURE — 80053 COMPREHEN METABOLIC PANEL: CPT

## 2020-08-30 PROCEDURE — 93005 ELECTROCARDIOGRAM TRACING: CPT

## 2020-08-30 PROCEDURE — 93010 ELECTROCARDIOGRAM REPORT: CPT | Mod: ,,, | Performed by: INTERNAL MEDICINE

## 2020-08-30 PROCEDURE — 85025 COMPLETE CBC W/AUTO DIFF WBC: CPT

## 2020-08-30 PROCEDURE — 36415 COLL VENOUS BLD VENIPUNCTURE: CPT

## 2020-08-30 PROCEDURE — 84484 ASSAY OF TROPONIN QUANT: CPT

## 2020-08-30 PROCEDURE — 94761 N-INVAS EAR/PLS OXIMETRY MLT: CPT

## 2020-08-30 PROCEDURE — 93010 EKG 12-LEAD: ICD-10-PCS | Mod: ,,, | Performed by: INTERNAL MEDICINE

## 2020-08-30 RX ORDER — SUCRALFATE 1 G/1
1 TABLET ORAL
Status: DISCONTINUED | OUTPATIENT
Start: 2020-08-30 | End: 2020-08-31 | Stop reason: HOSPADM

## 2020-08-30 RX ORDER — ACETAMINOPHEN 325 MG/1
650 TABLET ORAL EVERY 6 HOURS PRN
Status: DISCONTINUED | OUTPATIENT
Start: 2020-08-30 | End: 2020-08-31 | Stop reason: HOSPADM

## 2020-08-30 RX ADMIN — ACETAMINOPHEN 650 MG: 325 TABLET, FILM COATED ORAL at 06:08

## 2020-08-30 RX ADMIN — SUCRALFATE 1 G: 1 TABLET ORAL at 11:08

## 2020-08-30 RX ADMIN — NITROGLYCERIN TRANSDERMAL SYSTEM 1 PATCH: 0.2 PATCH, EXTENDED RELEASE TRANSDERMAL at 09:08

## 2020-08-30 RX ADMIN — ACETAMINOPHEN 650 MG: 325 TABLET, FILM COATED ORAL at 11:08

## 2020-08-30 RX ADMIN — SUCRALFATE 1 G: 1 TABLET ORAL at 01:08

## 2020-08-30 RX ADMIN — SUCRALFATE 1 G: 1 TABLET ORAL at 10:08

## 2020-08-30 RX ADMIN — SUCRALFATE 1 G: 1 TABLET ORAL at 06:08

## 2020-08-30 RX ADMIN — ACETAMINOPHEN 650 MG: 325 TABLET, FILM COATED ORAL at 01:08

## 2020-08-30 NOTE — PLAN OF CARE
Patient resting, requested PRN medications throughout the day for headache. Improved slightly since nitro patch removed. Will continue to monitor, GI consult called, tests scheduled for tomorrow.

## 2020-08-30 NOTE — PLAN OF CARE
08/30/20 1354   WYATT Message   Medicare Outpatient and Observation Notification regarding financial responsibility Given to patient/caregiver;Explained to patient/caregiver;Signed/date by patient/caregiver   Date WYATT was signed 08/30/20   Time WYATT was signed [id number]

## 2020-08-30 NOTE — PLAN OF CARE
POC reviewed with pt who verbalized understanding. AAOx4. Remains free of falls and injury. Nitro patch applied from chest pain. PO Amlodipine give for elevated BP. Up independently in room. Tolerating diet. Resting between care. Will continue to monitor.

## 2020-08-30 NOTE — NURSING TRANSFER
Nursing Transfer Note      8/30/2020     Transfer from ER to [id number]    Transfer via wheelchair    Transfer with pt belongings - no need for anything to be locked in security per patient    Transported by tech    Medicines sent: nitro bottle    Chart send with patient: yes    Notified: Dr. Bergeron    Patient reassessed at: 8/30/2020 @2210    Upon arrival to floor: AAOx4. Oriented to room. Bed in lowest and locked position. Call light in reach. Will continue to monitor.

## 2020-08-30 NOTE — ED PROVIDER NOTES
Encounter Date: 8/29/2020    SCRIBE #1 NOTE: I, Jasmine Hawkins, am scribing for, and in the presence of, Dr. Saha.       History     Chief Complaint   Patient presents with    Chest Pain     intermitten midsternal chest pain throughout the day.      Time seen by provider: 7:15 PM    This is a 79 y.o. female with hx of hiatal hernia and ulcers who presents with complaint of intermittent mid sternal chest pain this afternoon. She felt at baseline this morning. The pain is described as sharp and stabbing. She has had 2 to 3 brief episodes of pain while at rest. Last episode occurred 1 hour ago and lasted longer than previous episodes, approximately 1 to 2 minutes. She called her PCP, Dr. Leonard, who advised her to go to the ED for evaluation. She took Mylanta, though she denies pain feels similar to reflux. She started pantoprazole and pepcid 2 months ago. She currently has no pain. She reports nausea. She denies fever, diaphoresis, shortness of breath, vomiting, congestion, or rhinorrhea. No leg swelling or recent travel. She has had elevated blood pressure in clinic in the past, but has not been diagnosed with HTN and is not on any antihypertensive medication. She has not had a stress test or angiogram. She does not smoke. She denies hx of diabetes or high cholesterol. She reports FHx of 70 y.o. brother with cardiac problems.    The history is provided by the patient.     Review of patient's allergies indicates:   Allergen Reactions    Aspirin     Cortisone      Past Medical History:   Diagnosis Date    Allergy     Migraine     TB (pulmonary tuberculosis)      Past Surgical History:   Procedure Laterality Date    LUNG SURGERY       Family History   Family history unknown: Yes     Social History     Tobacco Use    Smoking status: Never Smoker    Smokeless tobacco: Never Used   Substance Use Topics    Alcohol use: Yes     Comment: socially    Drug use: Not on file     Review of Systems   Constitutional:  Negative for diaphoresis and fever.   HENT: Negative for congestion, rhinorrhea and sore throat.    Respiratory: Negative for shortness of breath.    Cardiovascular: Positive for chest pain. Negative for leg swelling.   Gastrointestinal: Positive for nausea. Negative for vomiting.   Genitourinary: Negative for dysuria.   Musculoskeletal: Negative for back pain.   Skin: Negative for color change, rash and wound.   Neurological: Negative for weakness.   Hematological: Does not bruise/bleed easily.   All other systems reviewed and are negative.      Physical Exam     Initial Vitals   BP Pulse Resp Temp SpO2   08/29/20 1905 08/29/20 1905 08/29/20 1905 08/29/20 1928 08/29/20 1905   (!) 193/92 82 16 98.5 °F (36.9 °C) 97 %      MAP       --                Physical Exam    Nursing note and vitals reviewed.  Constitutional: She appears well-developed and well-nourished. She is not diaphoretic. She is Obese . No distress.   HENT:   Head: Normocephalic and atraumatic.   Eyes: EOM are normal.   Neck: Normal range of motion. Neck supple.   Cardiovascular: Normal rate, regular rhythm and normal heart sounds. Exam reveals no gallop and no friction rub.    No murmur heard.  Pulmonary/Chest: Breath sounds normal. No respiratory distress. She has no wheezes. She has no rhonchi. She has no rales. She exhibits tenderness (mid chest).   Abdominal: Soft. There is no abdominal tenderness.   Musculoskeletal: Normal range of motion. No tenderness or edema.      Comments: No peripheral edema.   Neurological: She is alert and oriented to person, place, and time.   Skin: Skin is warm and dry.         ED Course   Critical Care    Date/Time: 8/29/2020 8:53 PM  Performed by: Philomena Saha MD  Authorized by: Philomena Saha MD   Direct patient critical care time: 15 minutes  Additional history critical care time: 6 minutes  Ordering / reviewing critical care time: 6 minutes  Documentation critical care time: 6 minutes  Consulting other physicians  critical care time: 12 minutes  Total critical care time (exclusive of procedural time) : 45 minutes  Critical care time was exclusive of separately billable procedures and treating other patients and teaching time.  Critical care was necessary to treat or prevent imminent or life-threatening deterioration of the following conditions: cardiac failure.  Critical care was time spent personally by me on the following activities: examination of patient, review of old charts, obtaining history from patient or surrogate, development of treatment plan with patient or surrogate, ordering and performing treatments and interventions, ordering and review of laboratory studies, ordering and review of radiographic studies, evaluation of patient's response to treatment, re-evaluation of patient's condition, interpretation of cardiac output measurements, discussions with primary provider and discussions with consultants.        Labs Reviewed   CBC W/ AUTO DIFFERENTIAL - Abnormal; Notable for the following components:       Result Value    Mean Corpuscular Hemoglobin 31.8 (*)     All other components within normal limits   COMPREHENSIVE METABOLIC PANEL   TROPONIN I   B-TYPE NATRIURETIC PEPTIDE   SARS-COV-2 RNA AMPLIFICATION, QUAL     EKG Readings: (Independently Interpreted)   2043: Normal sinus rhythm at rate of 80. No ST/T abnormalities. Normal intervals. Normal axis.  2059: Normal sinus rhythm at rate of 82. No ST/T abnormalities. Normal intervals. Normal axis. No change from previous.       Imaging Results          X-Ray Chest AP Portable (Final result)  Result time 08/29/20 19:44:09    Final result by Selma Diana MD (08/29/20 19:44:09)                 Impression:      No acute cardiopulmonary process identified.    Suspected moderate-sized hiatal hernia.      Electronically signed by: Selma Diana MD  Date:    08/29/2020  Time:    19:44             Narrative:    EXAMINATION:  XR CHEST AP PORTABLE    CLINICAL  HISTORY:  Chest Pain;    TECHNIQUE:  Single frontal view of the chest was performed.    COMPARISON:  June 2019.    FINDINGS:  Cardiac silhouette is normal in size.  Lungs are symmetrically expanded.  No evidence of focal consolidative process, pneumothorax, or significant pleural effusion.  No acute osseous abnormality identified.  Prominent rounded opacity is seen over the midline lower chest likely reflecting moderate size hiatal hernia, similar to previous exam.                              X-Rays:   Independently Interpreted Readings:   Chest X-Ray: Hiatal hernia. Normal heart size. No infiltrates.     Medical Decision Making:   History:   Old Medical Records: I decided to obtain old medical records.  Initial Assessment:   79 y.o. female with 2 episodes of chest pain today. Brief episodes with associated nausea without diaphoresis or shortness of breath. Noted hypertension without history. Plan labs including cardiac enzymes, EKG, CXR, monitoring blood pressure.  Differential Diagnosis:   Differential diagnosis includes, but is not limited to:  myocardial ischemia, pericarditis, pulmonary embolus, chest wall pain, pleural inflammation, pulmonary infectious causes, aortic dissection.     Independently Interpreted Test(s):   I have ordered and independently interpreted X-rays - see prior notes.  I have ordered and independently interpreted EKG Reading(s) - see prior notes  Clinical Tests:   Lab Tests: Ordered and Reviewed  Radiological Study: Ordered and Reviewed  Medical Tests: Ordered and Reviewed  ED Management:  Patient reported recurrent episode of chest pain while in the ED, EKG repeated without significant change or evidence of ischemia.  B-blocker and NTG ordered.     Additional MDM:   Heart Score:    History:          Slightly suspicious.  ECG:             Normal  Age:               >65 years  Risk factors: 1-2 risk factors  Troponin:       Less than or equal to normal limit  Final Score: 3              Scribe Attestation:   Scribe #1: I performed the above scribed service and the documentation accurately describes the services I performed. I attest to the accuracy of the note.    Attending Attestation:           Physician Attestation for Scribe:  Physician Attestation Statement for Scribe #1: I, Dr. Saha, reviewed documentation, as scribed by Jasmine Hawkins in my presence, and it is both accurate and complete.                 ED Course as of Aug 30 1726   Sat Aug 29, 2020   2037 Spoke with patient's PCP, Dr. Leonard. She would like patient to be admitted.    [AC]   2043 Patient currently complaining of chest pain. Will order repeat EKG.    [AC]   2053 Paged Cardiology.    [AC]   2054 Discussed case with Dr. Bergeron. Will admit patient to his service.    [AC]      ED Course User Index  [AC] Jasmine Hawkins                Clinical Impression:     1. Essential hypertension    2. Chest pain          Disposition:   Disposition: Placed in Observation  Condition: Stable     ED Disposition Condition    Observation                           Philomena Saha MD  08/30/20 3987

## 2020-08-30 NOTE — ED TRIAGE NOTES
Pt reports with chest pain x 3 h. States that she was watching a movie around 4:30 and had a sudden episode of chest pain that lasted 1-2 minutes. Pain described as sharp and stabbing, 8/10, non-radiating. Pt states that following the episode, she called her primary care physician who recommended she be seen in the ED. Pt states she had an identical episode around 6:30pm before coming to ED.  Pt states she had some nausea, but denies any vomiting.  Denies any SOB, recent fevers.

## 2020-08-30 NOTE — PLAN OF CARE
Initial Discharge Planning Assessment:  Patient admitted on: 8/29/20     Chart reviewed, Care plan discussed with treatment team,  attending Dr. Bergeron     PCP updated in Epic: Dr. Dr. Leonard  Pharmacy, updated in Epic: [facility]     DME at home: none      Current dispo: home with family      Transportation:  to drive home     Power of  or Living Will: none     Anticipated DC needs from CM perspective:  none     08/30/20 1359   Discharge Assessment   Assessment Type Discharge Planning Assessment   Confirmed/corrected address and phone number on facesheet? Yes   Assessment information obtained from? Patient   Communicated expected length of stay with patient/caregiver yes   Prior to hospitilization cognitive status: Alert/Oriented   Prior to hospitalization functional status: Independent   Current cognitive status: Alert/Oriented   Current Functional Status: Independent   Lives With spouse;child(chaitanya), adult   Able to Return to Prior Arrangements yes   Is patient able to care for self after discharge? Yes   Who are your caregiver(s) and their phone number(s)? spouse: Chi Lynch [phone]   Patient's perception of discharge disposition home or selfcare   Readmission Within the Last 30 Days no previous admission in last 30 days   Patient currently being followed by outpatient case management? No   Patient currently receives any other outside agency services? No   Equipment Currently Used at Home none   Do you have any problems affording any of your prescribed medications? No   Is the patient taking medications as prescribed? yes   Does the patient have transportation home? Yes   Transportation Anticipated family or friend will provide   Does the patient receive services at the [facility]mad[facility] Clinic? No   Discharge Plan A Home with family   DME Needed Upon Discharge  none   Patient/Family in Agreement with Plan yes

## 2020-08-30 NOTE — H&P
[facility]  History & Physical    History of Present Illness:  Mrs. Lynch is a 79 year old lady that called Dr. Leonard when she developed a sharp pain in the center of her chest.  She took some Mylanta, she had no relief, had recurrence of the pain, and sought further medical attention in the emergency room here.  She says that this pain was different from the pain she gets from her reflux, she has recently been started on Pepcid as well as double the dose of the pantoprazole.  She also has had some nausea.  In the past she has seen Dr. Govea, had an EGD 4 years ago which showed a hiatal hernia, and a Anatoliy's ulcer.  She has had sporadic high blood pressure in the past however has not been treated with antihypertensive medications.  In the 70s she had scarring noted on a an employment chest x-ray.  She had a negative PPD.  She later developed pleural effusion, and the diagnosis of tuberculosis was made based on the pleural fluid.  She was placed on INH and rifampin for a year  She has had longstanding migraine headaches.  She used to be under the care of Dr. Jennifer Workman.  She played racquetball and tennis till her orthopedic issues of for ft and knee restricted her over the last for 5 years.  She has had no diabetes, has never smoked cigarettes.    Her father is 104 and alive  Mother  of Alzheimer's disease in her 90s.  Ha 1 brother has apparently some valvular heart disease.    PTA Medications   Medication Sig    aspirin (ECOTRIN) 81 MG EC tablet Take 81 mg by mouth once daily.    cetirizine (ZYRTEC) 10 MG tablet Take 10 mg by mouth once daily.    co-enzyme Q-10 30 mg capsule Take 30 mg by mouth 3 (three) times daily.    DYMISTA 137-50 mcg/spray [place] nassal spray SPRAY ONCE IEN BID    famotidine (PEPCID AC ORAL) Take by mouth.    multivitamin (THERAGRAN) per tablet Take 1 tablet by mouth once daily.    pantoprazole (PROTONIX) 40 MG tablet Take 1 tablet (40 mg total) by mouth  once daily.    trimethobenzamide (TIGAN) 300 mg capsule Take 300 mg by mouth every 8 (eight) hours as needed.    vitamin D (VITAMIN D3) 1000 units Tab Take 1,000 Units by mouth once daily.    ascorbic acid, vitamin C, (VITAMIN C WITH MAYTE HIPS) 1000 MG tablet Take 1,000 mg by mouth once daily.    ciclopirox (PENLAC) 8 % Soln Apply topically nightly.    difluprednate (DUREZOL) 0.05 % Drop ophthalmic solution Durezol 0.05 % eye drops    ondansetron (ZOFRAN) 4 MG tablet Take 1 tablet (4 mg total) by mouth every 8 (eight) hours as needed for Nausea.    sucralfate (CARAFATE) 1 gram tablet Take 1 tablet (1 g total) by mouth 4 (four) times daily before meals and nightly.       Review of patient's allergies indicates:   Allergen Reactions    Aspirin     Cortisone        Past Medical History:   Diagnosis Date    Allergy     Migraine     TB (pulmonary tuberculosis)      Past Surgical History:   Procedure Laterality Date    LUNG SURGERY       Family History   Family history unknown: Yes     Social History     Tobacco Use    Smoking status: Never Smoker    Smokeless tobacco: Never Used   Substance Use Topics    Alcohol use: Yes     Comment: socially    Drug use: Not on file        Physical Exam:  The carotid upstroke is brisk  There is no carotid bruit  The chest is clear  The heart size is normal  S1 and S2 are normal.  There is no murmur or gallop.  The abdomen is soft and nontender.  The bowel sounds are normal.  The extremities are warm.  The neurological exam is intact.    Impression on Admission:   Chest pains  Essential hypertension  History of reflux, Anatoliy's ulcer  Migraine

## 2020-08-31 VITALS
WEIGHT: 190 LBS | DIASTOLIC BLOOD PRESSURE: 68 MMHG | HEART RATE: 71 BPM | TEMPERATURE: 98 F | HEIGHT: 62 IN | OXYGEN SATURATION: 96 % | SYSTOLIC BLOOD PRESSURE: 143 MMHG | RESPIRATION RATE: 12 BRPM | BODY MASS INDEX: 34.96 KG/M2

## 2020-08-31 PROBLEM — I10 ESSENTIAL HYPERTENSION: Status: ACTIVE | Noted: 2020-08-31

## 2020-08-31 LAB
ALBUMIN SERPL BCP-MCNC: 3.9 G/DL (ref 3.5–5.2)
ALP SERPL-CCNC: 54 U/L (ref 55–135)
ALT SERPL W/O P-5'-P-CCNC: 16 U/L (ref 10–44)
ANION GAP SERPL CALC-SCNC: 11 MMOL/L (ref 8–16)
ASCENDING AORTA: 2.85 CM
AST SERPL-CCNC: 19 U/L (ref 10–40)
AV INDEX (PROSTH): 0.65
AV MEAN GRADIENT: 6 MMHG
AV PEAK GRADIENT: 12 MMHG
AV VALVE AREA: 1.94 CM2
AV VELOCITY RATIO: 0.58
BASOPHILS # BLD AUTO: 0.03 K/UL (ref 0–0.2)
BASOPHILS NFR BLD: 0.5 % (ref 0–1.9)
BILIRUB SERPL-MCNC: 0.7 MG/DL (ref 0.1–1)
BSA FOR ECHO PROCEDURE: 1.94 M2
BUN SERPL-MCNC: 12 MG/DL (ref 8–23)
CALCIUM SERPL-MCNC: 9.1 MG/DL (ref 8.7–10.5)
CHLORIDE SERPL-SCNC: 108 MMOL/L (ref 95–110)
CO2 SERPL-SCNC: 21 MMOL/L (ref 23–29)
CREAT SERPL-MCNC: 0.7 MG/DL (ref 0.5–1.4)
CV ECHO LV RWT: 0.46 CM
CV STRESS BASE HR: 70 BPM
DIASTOLIC BLOOD PRESSURE: 68 MMHG
DIFFERENTIAL METHOD: NORMAL
DOP CALC AO PEAK VEL: 1.76 M/S
DOP CALC AO VTI: 34.19 CM
DOP CALC LVOT AREA: 3 CM2
DOP CALC LVOT DIAMETER: 1.95 CM
DOP CALC LVOT PEAK VEL: 1.02 M/S
DOP CALC LVOT STROKE VOLUME: 66.39 CM3
DOP CALCLVOT PEAK VEL VTI: 22.24 CM
E WAVE DECELERATION TIME: 244.34 MSEC
E/A RATIO: 0.8
E/E' RATIO: 11.54 M/S
ECHO LV POSTERIOR WALL: 1 CM (ref 0.6–1.1)
EOSINOPHIL # BLD AUTO: 0.3 K/UL (ref 0–0.5)
EOSINOPHIL NFR BLD: 4.3 % (ref 0–8)
ERYTHROCYTE [DISTWIDTH] IN BLOOD BY AUTOMATED COUNT: 13.3 % (ref 11.5–14.5)
EST. GFR  (AFRICAN AMERICAN): >60 ML/MIN/1.73 M^2
EST. GFR  (NON AFRICAN AMERICAN): >60 ML/MIN/1.73 M^2
FRACTIONAL SHORTENING: 42 % (ref 28–44)
GLUCOSE SERPL-MCNC: 93 MG/DL (ref 70–110)
HCT VFR BLD AUTO: 43.9 % (ref 37–48.5)
HGB BLD-MCNC: 14.2 G/DL (ref 12–16)
IMM GRANULOCYTES # BLD AUTO: 0.01 K/UL (ref 0–0.04)
IMM GRANULOCYTES NFR BLD AUTO: 0.2 % (ref 0–0.5)
INTERVENTRICULAR SEPTUM: 0.91 CM (ref 0.6–1.1)
IVRT: 114.6 MSEC
LA MAJOR: 4.84 CM
LA MINOR: 4.52 CM
LA WIDTH: 4.2 CM
LEFT ATRIUM SIZE: 3.78 CM
LEFT ATRIUM VOLUME INDEX MOD: 18.2 ML/M2
LEFT ATRIUM VOLUME INDEX: 33.7 ML/M2
LEFT ATRIUM VOLUME MOD: 34 CM3
LEFT ATRIUM VOLUME: 63.08 CM3
LEFT INTERNAL DIMENSION IN SYSTOLE: 2.56 CM (ref 2.1–4)
LEFT VENTRICLE DIASTOLIC VOLUME INDEX: 46.38 ML/M2
LEFT VENTRICLE DIASTOLIC VOLUME: 86.74 ML
LEFT VENTRICLE MASS INDEX: 74 G/M2
LEFT VENTRICLE SYSTOLIC VOLUME INDEX: 12.7 ML/M2
LEFT VENTRICLE SYSTOLIC VOLUME: 23.76 ML
LEFT VENTRICULAR INTERNAL DIMENSION IN DIASTOLE: 4.38 CM (ref 3.5–6)
LEFT VENTRICULAR MASS: 137.74 G
LV LATERAL E/E' RATIO: 10.71 M/S
LV SEPTAL E/E' RATIO: 12.5 M/S
LYMPHOCYTES # BLD AUTO: 2 K/UL (ref 1–4.8)
LYMPHOCYTES NFR BLD: 30.7 % (ref 18–48)
MCH RBC QN AUTO: 30.7 PG (ref 27–31)
MCHC RBC AUTO-ENTMCNC: 32.3 G/DL (ref 32–36)
MCV RBC AUTO: 95 FL (ref 82–98)
MONOCYTES # BLD AUTO: 0.5 K/UL (ref 0.3–1)
MONOCYTES NFR BLD: 7.9 % (ref 4–15)
MV PEAK A VEL: 0.94 M/S
MV PEAK E VEL: 0.75 M/S
MV STENOSIS PRESSURE HALF TIME: 70.86 MS
MV VALVE AREA P 1/2 METHOD: 3.1 CM2
NEUTROPHILS # BLD AUTO: 3.7 K/UL (ref 1.8–7.7)
NEUTROPHILS NFR BLD: 56.4 % (ref 38–73)
NRBC BLD-RTO: 0 /100 WBC
OHS CV CPX 85 PERCENT MAX PREDICTED HEART RATE MALE: 117
OHS CV CPX MAX PREDICTED HEART RATE: 137
OHS CV CPX PATIENT IS FEMALE: 1
OHS CV CPX PATIENT IS MALE: 0
OHS CV CPX PEAK DIASTOLIC BLOOD PRESSURE: 78 MMHG
OHS CV CPX PEAK HEAR RATE: 97 BPM
OHS CV CPX PEAK RATE PRESSURE PRODUCT: NORMAL
OHS CV CPX PEAK SYSTOLIC BLOOD PRESSURE: 147 MMHG
OHS CV CPX PERCENT MAX PREDICTED HEART RATE ACHIEVED: 71
OHS CV CPX RATE PRESSURE PRODUCT PRESENTING: NORMAL
PISA MRMAX VEL: 0.03 M/S
PLATELET # BLD AUTO: 226 K/UL (ref 150–350)
PMV BLD AUTO: 9.9 FL (ref 9.2–12.9)
POTASSIUM SERPL-SCNC: 3.9 MMOL/L (ref 3.5–5.1)
PROT SERPL-MCNC: 6.7 G/DL (ref 6–8.4)
PULM VEIN S/D RATIO: 1.45
PV PEAK D VEL: 0.38 M/S
PV PEAK S VEL: 0.55 M/S
PV PEAK VELOCITY: 0.79 CM/S
RA MAJOR: 4.55 CM
RA PRESSURE: 3 MMHG
RA WIDTH: 2.06 CM
RBC # BLD AUTO: 4.63 M/UL (ref 4–5.4)
RIGHT VENTRICULAR END-DIASTOLIC DIMENSION: 1.94 CM
RV TISSUE DOPPLER FREE WALL SYSTOLIC VELOCITY 1 (APICAL 4 CHAMBER VIEW): 13.53 CM/S
SINUS: 3.06 CM
SODIUM SERPL-SCNC: 140 MMOL/L (ref 136–145)
STJ: 2.74 CM
SYSTOLIC BLOOD PRESSURE: 143 MMHG
TDI LATERAL: 0.07 M/S
TDI SEPTAL: 0.06 M/S
TDI: 0.07 M/S
TRICUSPID ANNULAR PLANE SYSTOLIC EXCURSION: 1.22 CM
WBC # BLD AUTO: 6.57 K/UL (ref 3.9–12.7)

## 2020-08-31 PROCEDURE — 36415 COLL VENOUS BLD VENIPUNCTURE: CPT

## 2020-08-31 PROCEDURE — 80053 COMPREHEN METABOLIC PANEL: CPT

## 2020-08-31 PROCEDURE — G0378 HOSPITAL OBSERVATION PER HR: HCPCS

## 2020-08-31 PROCEDURE — 93041 RHYTHM ECG TRACING: CPT

## 2020-08-31 PROCEDURE — 93010 EKG 12-LEAD: ICD-10-PCS | Mod: ,,, | Performed by: INTERNAL MEDICINE

## 2020-08-31 PROCEDURE — 93005 ELECTROCARDIOGRAM TRACING: CPT

## 2020-08-31 PROCEDURE — 94761 N-INVAS EAR/PLS OXIMETRY MLT: CPT

## 2020-08-31 PROCEDURE — 25000003 PHARM REV CODE 250: Performed by: INTERNAL MEDICINE

## 2020-08-31 PROCEDURE — 85025 COMPLETE CBC W/AUTO DIFF WBC: CPT

## 2020-08-31 PROCEDURE — 93010 ELECTROCARDIOGRAM REPORT: CPT | Mod: ,,, | Performed by: INTERNAL MEDICINE

## 2020-08-31 RX ORDER — LOSARTAN POTASSIUM 50 MG/1
50 TABLET ORAL DAILY
Qty: 90 TABLET | Refills: 3 | Status: SHIPPED | OUTPATIENT
Start: 2020-08-31 | End: 2020-09-17

## 2020-08-31 RX ADMIN — SUCRALFATE 1 G: 1 TABLET ORAL at 11:08

## 2020-08-31 RX ADMIN — SUCRALFATE 1 G: 1 TABLET ORAL at 07:08

## 2020-08-31 NOTE — PLAN OF CARE
POC reviewed with patient. Pt currently resting in NAD.  VSS on RA. Sinus jade rhythm on telemetry. Pt ambulating to toilet independently. Pt reports frequent diarrhea this shift. No needs expressed at this time. Safety measures maintained. Pt instructed to use call light for assistance. Will continue to monitor.

## 2020-08-31 NOTE — PROGRESS NOTES
Discharge instructions printed, reviewed, and provided to pt. Pt and daughter demonstrated verbal understanding. IV removed w/out complications. All belongings sent home with pt. Order for transport placed.

## 2020-08-31 NOTE — DISCHARGE SUMMARY
[facility]  Cardiology  Discharge Summary      Patient Name: Annette Lynch    Admission Date: 8/29/2020    Discharge Date and Time: 8/31/20  Final Diagnoses: Atypical chest pain   Principal Problem: Chest pain    HPI:   History of Present Illness:  Mrs. Lynch is a 79 year old lady that called Dr. Leonard when she developed a sharp pain in the center of her chest.  She took some Mylanta, she had no relief, had recurrence of the pain, and sought further medical attention in the emergency room here.  She says that this pain was different from the pain she gets from her reflux, she has recently been started on Pepcid as well as double the dose of the pantoprazole.  She also has had some nausea.  In the past she has seen Dr. Govea, had an EGD 4 years ago which showed a hiatal hernia, and a Anatoliy's ulcer.  She has had sporadic high blood pressure in the past however has not been treated with antihypertensive medications.  In the 70s she had scarring noted on a an employment chest x-ray.  She had a negative PPD.  She later developed pleural effusion, and the diagnosis of tuberculosis was made based on the pleural fluid.  She was placed on INH and rifampin for a year  She has had longstanding migraine headaches.  She used to be under the care of Dr. Jennifer Workman.  She played racquetball and tennis till her orthopedic issues of her foot and knee restricted her over the last for 5 years.  She has had no diabetes, has never smoked cigarettes.       Impression on Admission: Chest pains, Reflux esophagitis, HBP.    Hospital Course:She was placed on topical NTG, which relieved her chest pain, however caused headaches.  A Lexiscan CL test was negative for ischemia.  Started Losaratn 50 mg po qd, for hypertension.    Disposition: Discharged to her home  Follow up/Patient Instructions:    Medications:   Annette Lynch   Home Medication Instructions GEOVANNA:[id number]    Printed on:08/31/20 6034   Medication  Information                      ascorbic acid, vitamin C, (VITAMIN C WITH MAYTE HIPS) 1000 MG tablet  Take 1,000 mg by mouth once daily.             aspirin (ECOTRIN) 81 MG EC tablet  Take 81 mg by mouth once daily.             cetirizine (ZYRTEC) 10 MG tablet  Take 10 mg by mouth once daily.             ciclopirox (PENLAC) 8 % Soln  Apply topically nightly.             co-enzyme Q-10 30 mg capsule  Take 30 mg by mouth 3 (three) times daily.             difluprednate (DUREZOL) 0.05 % Drop ophthalmic solution  Durezol 0.05 % eye drops             DYMISTA 137-50 mcg/spray [place] nassal spray  SPRAY ONCE IEN BID             famotidine (PEPCID AC ORAL)  Take by mouth.             losartan (COZAAR) 50 MG tablet  Take 1 tablet (50 mg total) by mouth once daily.             multivitamin (THERAGRAN) per tablet  Take 1 tablet by mouth once daily.             ondansetron (ZOFRAN) 4 MG tablet  Take 1 tablet (4 mg total) by mouth every 8 (eight) hours as needed for Nausea.             pantoprazole (PROTONIX) 40 MG tablet  Take 1 tablet (40 mg total) by mouth once daily.             sucralfate (CARAFATE) 1 gram tablet  Take 1 tablet (1 g total) by mouth 4 (four) times daily before meals and nightly.             trimethobenzamide (TIGAN) 300 mg capsule  Take 300 mg by mouth every 8 (eight) hours as needed.             vitamin D (VITAMIN D3) 1000 units Tab  Take 1,000 Units by mouth once daily.               Discharge Procedure Orders   Call MD for:  temperature >100.4     Call MD for:  persistent nausea and vomiting or diarrhea     Call MD for:  severe uncontrolled pain     Call MD for:  redness, tenderness, or signs of infection (pain, swelling, redness, odor or green/yellow discharge around incision site)     Call MD for:  difficulty breathing or increased cough     Follow-up Information     Mark Bergeron MD In 2 weeks.    Specialty: Cardiology  Contact information:  [address]  [place]  [address]  [phone]                   Condition upon Discharge: Good        Mark Bergeron MD

## 2020-08-31 NOTE — PROGRESS NOTES
Patient's chart reviewed, but not seen, as she was down for nuclear medicine stress test.    Briefly, patient is a 80 yo female with history of hiatal hernia with associated Anatoliy's ulcers who presented to hospital with complaints of mid sternal chest pain not responsive to Mylanta.  She was admitted for cardiac work up which is ongoing.  Cardiac enzymes and EKG appear unremarkable.    Plan:  1.  Increase Pantoprazole to twice daily  2.  Reflux precautions    Will try and see in the AM for full consultation, or she can follow up with Dr. Govea as outpatient should she desire discharge and cardiac w/u negative.

## 2020-08-31 NOTE — PLAN OF CARE
CM met with pt for final discharge planning assessment.    Pt is discharging home today, family to provide transportation home.'    Pt confirms no CM needs or barriers for discharge.    Pt to follow-up with Dr Govea,   08/31/20 1301   Final Note   Assessment Type Final Discharge Note   Anticipated Discharge Disposition Home   What phone number can be called within the next 1-3 days to see how you are doing after discharge? [phone]   Hospital Follow Up  Appt(s) scheduled? Yes   Discharge plans and expectations educations in teach back method with documentation complete? Yes   Right Care Referral Info   Post Acute Recommendation No Care

## 2021-01-09 ENCOUNTER — IMMUNIZATION (OUTPATIENT)
Dept: INTERNAL MEDICINE | Facility: CLINIC | Age: 81
End: 2021-01-09
Payer: MEDICARE

## 2021-01-09 DIAGNOSIS — Z23 NEED FOR VACCINATION: ICD-10-CM

## 2021-01-09 PROCEDURE — 91300 COVID-19, MRNA, LNP-S, PF, 30 MCG/0.3 ML DOSE VACCINE: CPT | Mod: PBBFAC | Performed by: FAMILY MEDICINE

## 2021-01-15 ENCOUNTER — HOSPITAL ENCOUNTER (OUTPATIENT)
Dept: RADIOLOGY | Facility: OTHER | Age: 81
Discharge: HOME | End: 2021-01-15
Attending: INTERNAL MEDICINE
Payer: MEDICARE

## 2021-01-15 DIAGNOSIS — Z12.31 SCREENING MAMMOGRAM, ENCOUNTER FOR: ICD-10-CM

## 2021-01-15 PROCEDURE — 77067 SCR MAMMO BI INCL CAD: CPT | Mod: 26,,, | Performed by: RADIOLOGY

## 2021-01-15 PROCEDURE — 77067 MAMMO DIGITAL SCREENING BILAT WITH TOMO: ICD-10-PCS | Mod: 26,,, | Performed by: RADIOLOGY

## 2021-01-15 PROCEDURE — 77067 SCR MAMMO BI INCL CAD: CPT | Mod: TC

## 2021-01-15 PROCEDURE — 77063 BREAST TOMOSYNTHESIS BI: CPT | Mod: 26,,, | Performed by: RADIOLOGY

## 2021-01-15 PROCEDURE — 77063 MAMMO DIGITAL SCREENING BILAT WITH TOMO: ICD-10-PCS | Mod: 26,,, | Performed by: RADIOLOGY

## 2021-01-30 ENCOUNTER — IMMUNIZATION (OUTPATIENT)
Dept: INTERNAL MEDICINE | Facility: CLINIC | Age: 81
End: 2021-01-30
Payer: MEDICARE

## 2021-01-30 DIAGNOSIS — Z23 NEED FOR VACCINATION: Primary | ICD-10-CM

## 2021-01-30 PROCEDURE — 91300 COVID-19, MRNA, LNP-S, PF, 30 MCG/0.3 ML DOSE VACCINE: CPT | Mod: PBBFAC,PO

## 2021-01-30 PROCEDURE — 0002A COVID-19, MRNA, LNP-S, PF, 30 MCG/0.3 ML DOSE VACCINE: CPT | Mod: PBBFAC,PO

## 2021-07-12 ENCOUNTER — LAB VISIT (OUTPATIENT)
Dept: LAB | Facility: HOSPITAL | Age: 81
End: 2021-07-12
Payer: MEDICARE

## 2021-07-12 DIAGNOSIS — R73.9 ELEVATED BLOOD SUGAR: ICD-10-CM

## 2021-07-12 DIAGNOSIS — Z79.899 MEDICATION MANAGEMENT: ICD-10-CM

## 2021-07-12 DIAGNOSIS — I10 ESSENTIAL HYPERTENSION: ICD-10-CM

## 2021-07-12 LAB
ALBUMIN SERPL BCP-MCNC: 4.1 G/DL (ref 3.5–5.2)
ALP SERPL-CCNC: 62 U/L (ref 55–135)
ALT SERPL W/O P-5'-P-CCNC: 22 U/L (ref 10–44)
ANION GAP SERPL CALC-SCNC: 7 MMOL/L (ref 8–16)
AST SERPL-CCNC: 22 U/L (ref 10–40)
BILIRUB SERPL-MCNC: 0.6 MG/DL (ref 0.1–1)
BUN SERPL-MCNC: 14 MG/DL (ref 8–23)
CALCIUM SERPL-MCNC: 10 MG/DL (ref 8.7–10.5)
CHLORIDE SERPL-SCNC: 103 MMOL/L (ref 95–110)
CO2 SERPL-SCNC: 27 MMOL/L (ref 23–29)
CREAT SERPL-MCNC: 0.8 MG/DL (ref 0.5–1.4)
EST. GFR  (AFRICAN AMERICAN): >60 ML/MIN/1.73 M^2
EST. GFR  (NON AFRICAN AMERICAN): >60 ML/MIN/1.73 M^2
ESTIMATED AVG GLUCOSE: 117 MG/DL (ref 68–131)
GLUCOSE SERPL-MCNC: 114 MG/DL (ref 70–110)
HBA1C MFR BLD: 5.7 % (ref 4–5.6)
POTASSIUM SERPL-SCNC: 4.6 MMOL/L (ref 3.5–5.1)
PROT SERPL-MCNC: 7.2 G/DL (ref 6–8.4)
SODIUM SERPL-SCNC: 137 MMOL/L (ref 136–145)
VIT B12 SERPL-MCNC: 554 PG/ML (ref 210–950)

## 2021-07-12 PROCEDURE — 82607 VITAMIN B-12: CPT | Performed by: INTERNAL MEDICINE

## 2021-07-12 PROCEDURE — 83036 HEMOGLOBIN GLYCOSYLATED A1C: CPT | Performed by: INTERNAL MEDICINE

## 2021-07-12 PROCEDURE — 36415 COLL VENOUS BLD VENIPUNCTURE: CPT | Mod: PO | Performed by: INTERNAL MEDICINE

## 2021-07-12 PROCEDURE — 80053 COMPREHEN METABOLIC PANEL: CPT | Performed by: INTERNAL MEDICINE

## 2021-08-02 ENCOUNTER — HOSPITAL ENCOUNTER (OUTPATIENT)
Dept: RADIOLOGY | Facility: OTHER | Age: 81
Discharge: HOME | End: 2021-08-02
Attending: INTERNAL MEDICINE
Payer: MEDICARE

## 2021-08-02 DIAGNOSIS — N64.4 BREAST PAIN: ICD-10-CM

## 2021-08-02 PROCEDURE — 76642 ULTRASOUND BREAST LIMITED: CPT | Mod: 26,LT,, | Performed by: RADIOLOGY

## 2021-08-02 PROCEDURE — 76642 US BREAST LEFT LIMITED: ICD-10-PCS | Mod: 26,LT,, | Performed by: RADIOLOGY

## 2021-08-02 PROCEDURE — 76642 ULTRASOUND BREAST LIMITED: CPT | Mod: TC,LT

## 2021-08-04 ENCOUNTER — HOSPITAL ENCOUNTER (EMERGENCY)
Facility: OTHER | Age: 81
Discharge: HOME | End: 2021-08-04
Attending: EMERGENCY MEDICINE
Payer: MEDICARE

## 2021-08-04 VITALS
HEART RATE: 67 BPM | SYSTOLIC BLOOD PRESSURE: 146 MMHG | DIASTOLIC BLOOD PRESSURE: 77 MMHG | RESPIRATION RATE: 20 BRPM | TEMPERATURE: 98 F | OXYGEN SATURATION: 98 %

## 2021-08-04 DIAGNOSIS — R53.1 WEAKNESS: ICD-10-CM

## 2021-08-04 DIAGNOSIS — R42 DIZZINESS: ICD-10-CM

## 2021-08-04 LAB
ALBUMIN SERPL BCP-MCNC: 4.1 G/DL (ref 3.5–5.2)
ALP SERPL-CCNC: 68 U/L (ref 55–135)
ALT SERPL W/O P-5'-P-CCNC: 23 U/L (ref 10–44)
ANION GAP SERPL CALC-SCNC: 10 MMOL/L (ref 8–16)
AST SERPL-CCNC: 22 U/L (ref 10–40)
BACTERIA #/AREA URNS HPF: NORMAL /HPF
BASOPHILS # BLD AUTO: 0.03 K/UL (ref 0–0.2)
BASOPHILS NFR BLD: 0.4 % (ref 0–1.9)
BILIRUB SERPL-MCNC: 0.7 MG/DL (ref 0.1–1)
BILIRUB UR QL STRIP: NEGATIVE
BUN SERPL-MCNC: 12 MG/DL (ref 8–23)
CALCIUM SERPL-MCNC: 10.1 MG/DL (ref 8.7–10.5)
CHLORIDE SERPL-SCNC: 107 MMOL/L (ref 95–110)
CLARITY UR: CLEAR
CO2 SERPL-SCNC: 25 MMOL/L (ref 23–29)
COLOR UR: YELLOW
CREAT SERPL-MCNC: 0.8 MG/DL (ref 0.5–1.4)
DIFFERENTIAL METHOD: ABNORMAL
EOSINOPHIL # BLD AUTO: 0.1 K/UL (ref 0–0.5)
EOSINOPHIL NFR BLD: 1.6 % (ref 0–8)
ERYTHROCYTE [DISTWIDTH] IN BLOOD BY AUTOMATED COUNT: 13.3 % (ref 11.5–14.5)
EST. GFR  (AFRICAN AMERICAN): >60 ML/MIN/1.73 M^2
EST. GFR  (NON AFRICAN AMERICAN): >60 ML/MIN/1.73 M^2
GLUCOSE SERPL-MCNC: 101 MG/DL (ref 70–110)
GLUCOSE UR QL STRIP: NEGATIVE
HCT VFR BLD AUTO: 45.2 % (ref 37–48.5)
HGB BLD-MCNC: 15 G/DL (ref 12–16)
HGB UR QL STRIP: ABNORMAL
IMM GRANULOCYTES # BLD AUTO: 0.01 K/UL (ref 0–0.04)
IMM GRANULOCYTES NFR BLD AUTO: 0.1 % (ref 0–0.5)
KETONES UR QL STRIP: ABNORMAL
LEUKOCYTE ESTERASE UR QL STRIP: ABNORMAL
LYMPHOCYTES # BLD AUTO: 1.6 K/UL (ref 1–4.8)
LYMPHOCYTES NFR BLD: 23.5 % (ref 18–48)
MCH RBC QN AUTO: 31.3 PG (ref 27–31)
MCHC RBC AUTO-ENTMCNC: 33.2 G/DL (ref 32–36)
MCV RBC AUTO: 94 FL (ref 82–98)
MICROSCOPIC COMMENT: NORMAL
MONOCYTES # BLD AUTO: 0.3 K/UL (ref 0.3–1)
MONOCYTES NFR BLD: 4.9 % (ref 4–15)
NEUTROPHILS # BLD AUTO: 4.8 K/UL (ref 1.8–7.7)
NEUTROPHILS NFR BLD: 69.5 % (ref 38–73)
NITRITE UR QL STRIP: NEGATIVE
NRBC BLD-RTO: 0 /100 WBC
PH UR STRIP: 6 [PH] (ref 5–8)
PLATELET # BLD AUTO: 246 K/UL (ref 150–450)
PMV BLD AUTO: 9.8 FL (ref 9.2–12.9)
POTASSIUM SERPL-SCNC: 4.7 MMOL/L (ref 3.5–5.1)
PROT SERPL-MCNC: 7.2 G/DL (ref 6–8.4)
PROT UR QL STRIP: NEGATIVE
RBC # BLD AUTO: 4.8 M/UL (ref 4–5.4)
RBC #/AREA URNS HPF: 1 /HPF (ref 0–4)
SODIUM SERPL-SCNC: 142 MMOL/L (ref 136–145)
SP GR UR STRIP: 1.01 (ref 1–1.03)
SQUAMOUS #/AREA URNS HPF: 0 /HPF
URN SPEC COLLECT METH UR: ABNORMAL
UROBILINOGEN UR STRIP-ACNC: NEGATIVE EU/DL
WBC # BLD AUTO: 6.94 K/UL (ref 3.9–12.7)
WBC #/AREA URNS HPF: 2 /HPF (ref 0–5)

## 2021-08-04 PROCEDURE — 80053 COMPREHEN METABOLIC PANEL: CPT | Performed by: EMERGENCY MEDICINE

## 2021-08-04 PROCEDURE — 81000 URINALYSIS NONAUTO W/SCOPE: CPT | Performed by: EMERGENCY MEDICINE

## 2021-08-04 PROCEDURE — 93010 EKG 12-LEAD: ICD-10-PCS | Mod: ,,, | Performed by: INTERNAL MEDICINE

## 2021-08-04 PROCEDURE — 85025 COMPLETE CBC W/AUTO DIFF WBC: CPT | Performed by: EMERGENCY MEDICINE

## 2021-08-04 PROCEDURE — 99285 EMERGENCY DEPT VISIT HI MDM: CPT | Mod: 25

## 2021-08-04 PROCEDURE — 93010 ELECTROCARDIOGRAM REPORT: CPT | Mod: ,,, | Performed by: INTERNAL MEDICINE

## 2021-08-04 PROCEDURE — 36000 PLACE NEEDLE IN VEIN: CPT

## 2021-08-04 PROCEDURE — 93005 ELECTROCARDIOGRAM TRACING: CPT

## 2021-10-02 ENCOUNTER — OFFICE VISIT (OUTPATIENT)
Dept: URGENT CARE | Facility: CLINIC | Age: 81
End: 2021-10-02
Payer: MEDICARE

## 2021-10-02 VITALS
RESPIRATION RATE: 20 BRPM | DIASTOLIC BLOOD PRESSURE: 84 MMHG | HEART RATE: 83 BPM | HEIGHT: 66 IN | BODY MASS INDEX: 30.53 KG/M2 | OXYGEN SATURATION: 96 % | SYSTOLIC BLOOD PRESSURE: 152 MMHG | WEIGHT: 190 LBS

## 2021-10-02 DIAGNOSIS — R11.10 VOMITING, INTRACTABILITY OF VOMITING NOT SPECIFIED, PRESENCE OF NAUSEA NOT SPECIFIED, UNSPECIFIED VOMITING TYPE: Primary | ICD-10-CM

## 2021-10-02 DIAGNOSIS — G43.909 MIGRAINE WITHOUT STATUS MIGRAINOSUS, NOT INTRACTABLE, UNSPECIFIED MIGRAINE TYPE: ICD-10-CM

## 2021-10-02 DIAGNOSIS — I10 PRIMARY HYPERTENSION: ICD-10-CM

## 2021-10-02 DIAGNOSIS — R42 DIZZINESS: ICD-10-CM

## 2021-10-02 LAB
BILIRUB UR QL STRIP: NEGATIVE
CTP QC/QA: YES
GLUCOSE SERPL-MCNC: 103 MG/DL (ref 70–110)
GLUCOSE UR QL STRIP: NEGATIVE
KETONES UR QL STRIP: NEGATIVE
LEUKOCYTE ESTERASE UR QL STRIP: NEGATIVE
PH, POC UA: 5 (ref 5–8)
POC ANION GAP: 19 MMOL/L (ref 10–20)
POC BLOOD, URINE: NEGATIVE
POC BUN: 14 MMOL/L (ref 8–26)
POC CHLORIDE: 103 MMOL/L (ref 98–109)
POC CREATININE: 0.5 MG/DL (ref 0.6–1.3)
POC HEMATOCRIT: 44 %PCV (ref 37–47)
POC HEMOGLOBIN: 15 G/DL (ref 12.5–16)
POC ICA: 1.21 MMOL/L (ref 1.12–1.32)
POC NITRATES, URINE: NEGATIVE
POC POTASSIUM: 4.2 MMOL/L (ref 3.5–4.9)
POC SODIUM: 142 MMOL/L (ref 138–146)
POC TCO2: 26 MMOL/L (ref 24–29)
PROT UR QL STRIP: NEGATIVE
SARS-COV-2 RDRP RESP QL NAA+PROBE: NEGATIVE
SP GR UR STRIP: 1.02 (ref 1–1.03)
UROBILINOGEN UR STRIP-ACNC: NORMAL (ref 0.1–1.1)

## 2021-10-02 PROCEDURE — 81003 URINALYSIS AUTO W/O SCOPE: CPT | Mod: QW,S$GLB,, | Performed by: FAMILY MEDICINE

## 2021-10-02 PROCEDURE — 80047 POCT CHEMISTRY PANEL: ICD-10-PCS | Mod: QW,S$GLB,, | Performed by: FAMILY MEDICINE

## 2021-10-02 PROCEDURE — 93005 ELECTROCARDIOGRAM TRACING: CPT | Mod: S$GLB,,, | Performed by: FAMILY MEDICINE

## 2021-10-02 PROCEDURE — 99214 PR OFFICE/OUTPT VISIT, EST, LEVL IV, 30-39 MIN: ICD-10-PCS | Mod: 25,S$GLB,CS, | Performed by: FAMILY MEDICINE

## 2021-10-02 PROCEDURE — 81003 POCT URINALYSIS, DIPSTICK, AUTOMATED, W/O SCOPE: ICD-10-PCS | Mod: QW,S$GLB,, | Performed by: FAMILY MEDICINE

## 2021-10-02 PROCEDURE — 80047 BASIC METABLC PNL IONIZED CA: CPT | Mod: QW,S$GLB,, | Performed by: FAMILY MEDICINE

## 2021-10-02 PROCEDURE — U0002 COVID-19 LAB TEST NON-CDC: HCPCS | Mod: QW,CR,S$GLB, | Performed by: FAMILY MEDICINE

## 2021-10-02 PROCEDURE — 93010 ELECTROCARDIOGRAM REPORT: CPT | Mod: S$PBB,,, | Performed by: INTERNAL MEDICINE

## 2021-10-02 PROCEDURE — 93010 EKG 12-LEAD: ICD-10-PCS | Mod: S$PBB,,, | Performed by: INTERNAL MEDICINE

## 2021-10-02 PROCEDURE — 93005 EKG 12-LEAD: ICD-10-PCS | Mod: S$GLB,,, | Performed by: FAMILY MEDICINE

## 2021-10-02 PROCEDURE — U0002: ICD-10-PCS | Mod: QW,CR,S$GLB, | Performed by: FAMILY MEDICINE

## 2021-10-02 PROCEDURE — 99214 OFFICE O/P EST MOD 30 MIN: CPT | Mod: 25,S$GLB,CS, | Performed by: FAMILY MEDICINE

## 2021-10-02 RX ORDER — AMLODIPINE BESYLATE 2.5 MG/1
2.5 TABLET ORAL DAILY
Qty: 30 TABLET | Refills: 1 | Status: SHIPPED | OUTPATIENT
Start: 2021-10-02 | End: 2021-10-22

## 2021-10-02 RX ORDER — TRIMETHOBENZAMIDE HCL 300 MG
300 CAPSULE ORAL 2 TIMES DAILY PRN
Qty: 15 CAPSULE | Refills: 0 | Status: SHIPPED | OUTPATIENT
Start: 2021-10-02 | End: 2021-10-09

## 2021-10-03 ENCOUNTER — TELEPHONE (OUTPATIENT)
Dept: URGENT CARE | Facility: CLINIC | Age: 81
End: 2021-10-03

## 2021-10-13 ENCOUNTER — TELEPHONE (OUTPATIENT)
Dept: NEUROLOGY | Facility: CLINIC | Age: 81
End: 2021-10-13

## 2021-10-14 ENCOUNTER — LAB VISIT (OUTPATIENT)
Dept: LAB | Facility: OTHER | Age: 81
End: 2021-10-14
Attending: INTERNAL MEDICINE
Payer: MEDICARE

## 2021-10-14 DIAGNOSIS — R51.9 NONINTRACTABLE HEADACHE, UNSPECIFIED CHRONICITY PATTERN, UNSPECIFIED HEADACHE TYPE: ICD-10-CM

## 2021-10-14 DIAGNOSIS — R53.83 FATIGUE, UNSPECIFIED TYPE: ICD-10-CM

## 2021-10-14 LAB
ANION GAP SERPL CALC-SCNC: 9 MMOL/L (ref 8–16)
BASOPHILS # BLD AUTO: 0.05 K/UL (ref 0–0.2)
BASOPHILS NFR BLD: 0.7 % (ref 0–1.9)
BUN SERPL-MCNC: 16 MG/DL (ref 8–23)
CALCIUM SERPL-MCNC: 9.7 MG/DL (ref 8.7–10.5)
CHLORIDE SERPL-SCNC: 105 MMOL/L (ref 95–110)
CO2 SERPL-SCNC: 26 MMOL/L (ref 23–29)
CORTIS SERPL-MCNC: 15.6 UG/DL (ref 4.3–22.4)
CREAT SERPL-MCNC: 0.8 MG/DL (ref 0.5–1.4)
DIFFERENTIAL METHOD: ABNORMAL
EOSINOPHIL # BLD AUTO: 0.2 K/UL (ref 0–0.5)
EOSINOPHIL NFR BLD: 3.4 % (ref 0–8)
ERYTHROCYTE [DISTWIDTH] IN BLOOD BY AUTOMATED COUNT: 13.3 % (ref 11.5–14.5)
ERYTHROCYTE [SEDIMENTATION RATE] IN BLOOD: 9 MM/HR (ref 0–20)
EST. GFR  (AFRICAN AMERICAN): >60 ML/MIN/1.73 M^2
EST. GFR  (NON AFRICAN AMERICAN): >60 ML/MIN/1.73 M^2
GLUCOSE SERPL-MCNC: 74 MG/DL (ref 70–110)
HCT VFR BLD AUTO: 44.8 % (ref 37–48.5)
HGB BLD-MCNC: 14.8 G/DL (ref 12–16)
IMM GRANULOCYTES # BLD AUTO: 0.01 K/UL (ref 0–0.04)
IMM GRANULOCYTES NFR BLD AUTO: 0.1 % (ref 0–0.5)
LYMPHOCYTES # BLD AUTO: 2.3 K/UL (ref 1–4.8)
LYMPHOCYTES NFR BLD: 33 % (ref 18–48)
MCH RBC QN AUTO: 31.7 PG (ref 27–31)
MCHC RBC AUTO-ENTMCNC: 33 G/DL (ref 32–36)
MCV RBC AUTO: 96 FL (ref 82–98)
MONOCYTES # BLD AUTO: 0.6 K/UL (ref 0.3–1)
MONOCYTES NFR BLD: 8.1 % (ref 4–15)
NEUTROPHILS # BLD AUTO: 3.8 K/UL (ref 1.8–7.7)
NEUTROPHILS NFR BLD: 54.7 % (ref 38–73)
NRBC BLD-RTO: 0 /100 WBC
PLATELET # BLD AUTO: 272 K/UL (ref 150–450)
PMV BLD AUTO: 10.3 FL (ref 9.2–12.9)
POTASSIUM SERPL-SCNC: 3.7 MMOL/L (ref 3.5–5.1)
RBC # BLD AUTO: 4.67 M/UL (ref 4–5.4)
SODIUM SERPL-SCNC: 140 MMOL/L (ref 136–145)
TSH SERPL DL<=0.005 MIU/L-ACNC: 0.89 UIU/ML (ref 0.4–4)
WBC # BLD AUTO: 7.02 K/UL (ref 3.9–12.7)

## 2021-10-14 PROCEDURE — 36415 COLL VENOUS BLD VENIPUNCTURE: CPT | Performed by: INTERNAL MEDICINE

## 2021-10-14 PROCEDURE — 85025 COMPLETE CBC W/AUTO DIFF WBC: CPT | Performed by: INTERNAL MEDICINE

## 2021-10-14 PROCEDURE — 82533 TOTAL CORTISOL: CPT | Performed by: INTERNAL MEDICINE

## 2021-10-14 PROCEDURE — 80048 BASIC METABOLIC PNL TOTAL CA: CPT | Performed by: INTERNAL MEDICINE

## 2021-10-14 PROCEDURE — 84443 ASSAY THYROID STIM HORMONE: CPT | Performed by: INTERNAL MEDICINE

## 2021-10-14 PROCEDURE — 85651 RBC SED RATE NONAUTOMATED: CPT | Performed by: INTERNAL MEDICINE

## 2021-10-22 ENCOUNTER — OFFICE VISIT (OUTPATIENT)
Dept: CARDIOLOGY | Facility: CLINIC | Age: 81
End: 2021-10-22
Attending: INTERNAL MEDICINE
Payer: MEDICARE

## 2021-10-22 VITALS
HEART RATE: 76 BPM | DIASTOLIC BLOOD PRESSURE: 94 MMHG | SYSTOLIC BLOOD PRESSURE: 138 MMHG | WEIGHT: 190 LBS | BODY MASS INDEX: 30.53 KG/M2 | OXYGEN SATURATION: 96 % | HEIGHT: 66 IN

## 2021-10-22 DIAGNOSIS — I10 ESSENTIAL HYPERTENSION: Primary | ICD-10-CM

## 2021-10-22 DIAGNOSIS — I10 HYPERTENSION, UNSPECIFIED TYPE: ICD-10-CM

## 2021-10-22 PROCEDURE — 99213 OFFICE O/P EST LOW 20 MIN: CPT | Mod: PBBFAC | Performed by: INTERNAL MEDICINE

## 2021-10-22 PROCEDURE — 99203 OFFICE O/P NEW LOW 30 MIN: CPT | Mod: S$PBB,,, | Performed by: INTERNAL MEDICINE

## 2021-10-22 PROCEDURE — 99203 PR OFFICE/OUTPT VISIT, NEW, LEVL III, 30-44 MIN: ICD-10-PCS | Mod: S$PBB,,, | Performed by: INTERNAL MEDICINE

## 2021-10-22 PROCEDURE — 99999 PR PBB SHADOW E&M-EST. PATIENT-LVL III: CPT | Mod: PBBFAC,,, | Performed by: INTERNAL MEDICINE

## 2021-10-22 PROCEDURE — 99999 PR PBB SHADOW E&M-EST. PATIENT-LVL III: ICD-10-PCS | Mod: PBBFAC,,, | Performed by: INTERNAL MEDICINE

## 2021-10-22 RX ORDER — FAMOTIDINE 20 MG/1
20 TABLET, FILM COATED ORAL 2 TIMES DAILY
COMMUNITY
End: 2022-01-19

## 2021-10-29 ENCOUNTER — IMMUNIZATION (OUTPATIENT)
Dept: INTERNAL MEDICINE | Facility: CLINIC | Age: 81
End: 2021-10-29
Payer: MEDICARE

## 2021-10-29 DIAGNOSIS — Z23 NEED FOR VACCINATION: Primary | ICD-10-CM

## 2021-10-29 PROCEDURE — 91300 COVID-19, MRNA, LNP-S, PF, 30 MCG/0.3 ML DOSE VACCINE: CPT | Mod: PBBFAC

## 2021-10-29 PROCEDURE — 0003A COVID-19, MRNA, LNP-S, PF, 30 MCG/0.3 ML DOSE VACCINE: CPT | Mod: PBBFAC

## 2021-11-08 ENCOUNTER — TELEPHONE (OUTPATIENT)
Dept: CARDIOLOGY | Facility: CLINIC | Age: 81
End: 2021-11-08
Payer: MEDICARE

## 2021-11-08 ENCOUNTER — PATIENT MESSAGE (OUTPATIENT)
Dept: CARDIOLOGY | Facility: CLINIC | Age: 81
End: 2021-11-08
Payer: MEDICARE

## 2021-11-08 ENCOUNTER — OFFICE VISIT (OUTPATIENT)
Dept: URGENT CARE | Facility: CLINIC | Age: 81
End: 2021-11-08
Payer: MEDICARE

## 2021-11-08 VITALS
HEIGHT: 62 IN | HEART RATE: 85 BPM | DIASTOLIC BLOOD PRESSURE: 89 MMHG | RESPIRATION RATE: 18 BRPM | TEMPERATURE: 97 F | BODY MASS INDEX: 34.78 KG/M2 | WEIGHT: 189 LBS | SYSTOLIC BLOOD PRESSURE: 156 MMHG | OXYGEN SATURATION: 96 %

## 2021-11-08 DIAGNOSIS — R42 DIZZINESS: Primary | ICD-10-CM

## 2021-11-08 LAB
GLUCOSE SERPL-MCNC: 103 MG/DL (ref 70–110)
POC ANION GAP: 14 MMOL/L (ref 10–20)
POC BUN: 13 MMOL/L (ref 8–26)
POC CHLORIDE: 104 MMOL/L (ref 98–109)
POC CREATININE: 0.6 MG/DL (ref 0.6–1.3)
POC HEMATOCRIT: 48 %PCV (ref 37–47)
POC HEMOGLOBIN: 16.3 G/DL (ref 12.5–16)
POC ICA: 1.19 MMOL/L (ref 1.12–1.32)
POC POTASSIUM: 3.7 MMOL/L (ref 3.5–4.9)
POC SODIUM: 140 MMOL/L (ref 138–146)
POC TCO2: 27 MMOL/L (ref 24–29)

## 2021-11-08 PROCEDURE — 99213 OFFICE O/P EST LOW 20 MIN: CPT | Mod: S$GLB,,, | Performed by: NURSE PRACTITIONER

## 2021-11-08 PROCEDURE — 93005 ELECTROCARDIOGRAM TRACING: CPT | Mod: S$GLB,,, | Performed by: NURSE PRACTITIONER

## 2021-11-08 PROCEDURE — 80047 POCT CHEMISTRY PANEL: ICD-10-PCS | Mod: QW,S$GLB,, | Performed by: NURSE PRACTITIONER

## 2021-11-08 PROCEDURE — 80047 BASIC METABLC PNL IONIZED CA: CPT | Mod: QW,S$GLB,, | Performed by: NURSE PRACTITIONER

## 2021-11-08 PROCEDURE — 99213 PR OFFICE/OUTPT VISIT, EST, LEVL III, 20-29 MIN: ICD-10-PCS | Mod: S$GLB,,, | Performed by: NURSE PRACTITIONER

## 2021-11-08 PROCEDURE — 93010 EKG 12-LEAD: ICD-10-PCS | Mod: S$PBB,,, | Performed by: INTERNAL MEDICINE

## 2021-11-08 PROCEDURE — 93010 ELECTROCARDIOGRAM REPORT: CPT | Mod: S$PBB,,, | Performed by: INTERNAL MEDICINE

## 2021-11-08 PROCEDURE — 93005 EKG 12-LEAD: ICD-10-PCS | Mod: S$GLB,,, | Performed by: NURSE PRACTITIONER

## 2021-11-10 ENCOUNTER — PATIENT MESSAGE (OUTPATIENT)
Dept: CARDIOLOGY | Facility: CLINIC | Age: 81
End: 2021-11-10
Payer: MEDICARE

## 2021-11-10 DIAGNOSIS — I10 ESSENTIAL HYPERTENSION: Primary | ICD-10-CM

## 2021-11-12 ENCOUNTER — LAB VISIT (OUTPATIENT)
Dept: LAB | Facility: OTHER | Age: 81
End: 2021-11-12
Attending: INTERNAL MEDICINE
Payer: MEDICARE

## 2021-11-12 DIAGNOSIS — G44.59 OTHER COMPLICATED HEADACHE SYNDROME: ICD-10-CM

## 2021-11-12 DIAGNOSIS — Z79.899 MEDICATION MANAGEMENT: ICD-10-CM

## 2021-11-12 DIAGNOSIS — I10 ESSENTIAL HYPERTENSION: ICD-10-CM

## 2021-11-12 DIAGNOSIS — R73.9 ELEVATED BLOOD SUGAR: ICD-10-CM

## 2021-11-12 LAB
ALBUMIN SERPL BCP-MCNC: 3.9 G/DL (ref 3.5–5.2)
ALP SERPL-CCNC: 59 U/L (ref 55–135)
ALT SERPL W/O P-5'-P-CCNC: 19 U/L (ref 10–44)
ANION GAP SERPL CALC-SCNC: 11 MMOL/L (ref 8–16)
AST SERPL-CCNC: 18 U/L (ref 10–40)
BASOPHILS # BLD AUTO: 0.03 K/UL (ref 0–0.2)
BASOPHILS NFR BLD: 0.5 % (ref 0–1.9)
BILIRUB SERPL-MCNC: 0.6 MG/DL (ref 0.1–1)
BUN SERPL-MCNC: 11 MG/DL (ref 8–23)
CALCIUM SERPL-MCNC: 9.4 MG/DL (ref 8.7–10.5)
CHLORIDE SERPL-SCNC: 105 MMOL/L (ref 95–110)
CO2 SERPL-SCNC: 24 MMOL/L (ref 23–29)
CREAT SERPL-MCNC: 0.7 MG/DL (ref 0.5–1.4)
DIFFERENTIAL METHOD: NORMAL
EOSINOPHIL # BLD AUTO: 0.2 K/UL (ref 0–0.5)
EOSINOPHIL NFR BLD: 3 % (ref 0–8)
ERYTHROCYTE [DISTWIDTH] IN BLOOD BY AUTOMATED COUNT: 12.6 % (ref 11.5–14.5)
EST. GFR  (AFRICAN AMERICAN): >60 ML/MIN/1.73 M^2
EST. GFR  (NON AFRICAN AMERICAN): >60 ML/MIN/1.73 M^2
ESTIMATED AVG GLUCOSE: 114 MG/DL (ref 68–131)
GLUCOSE SERPL-MCNC: 96 MG/DL (ref 70–110)
HBA1C MFR BLD: 5.6 % (ref 4–5.6)
HCT VFR BLD AUTO: 45.6 % (ref 37–48.5)
HGB BLD-MCNC: 14.8 G/DL (ref 12–16)
IMM GRANULOCYTES # BLD AUTO: 0.01 K/UL (ref 0–0.04)
IMM GRANULOCYTES NFR BLD AUTO: 0.2 % (ref 0–0.5)
LYMPHOCYTES # BLD AUTO: 1.9 K/UL (ref 1–4.8)
LYMPHOCYTES NFR BLD: 32.7 % (ref 18–48)
MAGNESIUM SERPL-MCNC: 2 MG/DL (ref 1.6–2.6)
MCH RBC QN AUTO: 31 PG (ref 27–31)
MCHC RBC AUTO-ENTMCNC: 32.5 G/DL (ref 32–36)
MCV RBC AUTO: 96 FL (ref 82–98)
MONOCYTES # BLD AUTO: 0.3 K/UL (ref 0.3–1)
MONOCYTES NFR BLD: 5.5 % (ref 4–15)
NEUTROPHILS # BLD AUTO: 3.3 K/UL (ref 1.8–7.7)
NEUTROPHILS NFR BLD: 58.1 % (ref 38–73)
NRBC BLD-RTO: 0 /100 WBC
PLATELET # BLD AUTO: 274 K/UL (ref 150–450)
PMV BLD AUTO: 10.2 FL (ref 9.2–12.9)
POTASSIUM SERPL-SCNC: 4 MMOL/L (ref 3.5–5.1)
PROT SERPL-MCNC: 7.1 G/DL (ref 6–8.4)
RBC # BLD AUTO: 4.77 M/UL (ref 4–5.4)
SODIUM SERPL-SCNC: 140 MMOL/L (ref 136–145)
T4 FREE SERPL-MCNC: 1.02 NG/DL (ref 0.71–1.51)
THYROPEROXIDASE IGG SERPL-ACNC: <6 IU/ML
TSH SERPL DL<=0.005 MIU/L-ACNC: 0.55 UIU/ML (ref 0.4–4)
VIT B12 SERPL-MCNC: 1593 PG/ML (ref 210–950)
WBC # BLD AUTO: 5.68 K/UL (ref 3.9–12.7)

## 2021-11-12 PROCEDURE — 82607 VITAMIN B-12: CPT | Performed by: INTERNAL MEDICINE

## 2021-11-12 PROCEDURE — 36415 COLL VENOUS BLD VENIPUNCTURE: CPT | Performed by: INTERNAL MEDICINE

## 2021-11-12 PROCEDURE — 85025 COMPLETE CBC W/AUTO DIFF WBC: CPT | Performed by: INTERNAL MEDICINE

## 2021-11-12 PROCEDURE — 84443 ASSAY THYROID STIM HORMONE: CPT | Performed by: INTERNAL MEDICINE

## 2021-11-12 PROCEDURE — 84439 ASSAY OF FREE THYROXINE: CPT | Performed by: INTERNAL MEDICINE

## 2021-11-12 PROCEDURE — 83036 HEMOGLOBIN GLYCOSYLATED A1C: CPT | Performed by: INTERNAL MEDICINE

## 2021-11-12 PROCEDURE — 83735 ASSAY OF MAGNESIUM: CPT | Performed by: INTERNAL MEDICINE

## 2021-11-12 PROCEDURE — 80053 COMPREHEN METABOLIC PANEL: CPT | Performed by: INTERNAL MEDICINE

## 2021-11-15 ENCOUNTER — LAB VISIT (OUTPATIENT)
Dept: LAB | Facility: OTHER | Age: 81
End: 2021-11-15
Attending: INTERNAL MEDICINE
Payer: MEDICARE

## 2021-11-15 DIAGNOSIS — I10 ESSENTIAL HYPERTENSION: ICD-10-CM

## 2021-11-15 PROCEDURE — 84585 ASSAY OF URINE VMA: CPT | Performed by: INTERNAL MEDICINE

## 2021-11-16 ENCOUNTER — PATIENT MESSAGE (OUTPATIENT)
Dept: CARDIOLOGY | Facility: CLINIC | Age: 81
End: 2021-11-16
Payer: MEDICARE

## 2021-11-16 DIAGNOSIS — R00.2 PALPITATIONS: Primary | ICD-10-CM

## 2021-11-18 LAB
COLLECT DURATION TIME SPEC: 24 HR
CREAT 24H UR-MRATE: 836 MG/D (ref 500–1400)
CREAT UR-MCNC: 44 MG/DL
SPECIMEN VOL ?TM UR: 1900 ML
VMA & CREAT 24H UR-IMP: NORMAL
VMA 24H UR-MRATE: 4 MG/D (ref 0–7)
VMA UR-MCNC: 2.1 MG/L
VMA/CREAT 24H UR: 5 MG/GCR (ref 0–6)

## 2021-11-20 LAB
ANNOTATION COMMENT IMP: NORMAL
COLLECT DURATION TIME UR: 24 H
METANEPH 24H UR-MRATE: 76 MCG/24 H
METANEPHS 24H UR-MRATE: 287 MCG/24 H
NORMETANEPHRINE 24H UR-MRATE: 211 MCG/24 H
SPECIMEN VOL 24H UR: 1900 ML

## 2021-11-22 ENCOUNTER — OFFICE VISIT (OUTPATIENT)
Dept: NEUROLOGY | Facility: CLINIC | Age: 81
End: 2021-11-22
Payer: MEDICARE

## 2021-11-22 ENCOUNTER — PATIENT MESSAGE (OUTPATIENT)
Dept: NEUROLOGY | Facility: CLINIC | Age: 81
End: 2021-11-22

## 2021-11-22 VITALS
DIASTOLIC BLOOD PRESSURE: 79 MMHG | BODY MASS INDEX: 34.57 KG/M2 | HEIGHT: 62 IN | SYSTOLIC BLOOD PRESSURE: 149 MMHG | HEART RATE: 78 BPM

## 2021-11-22 DIAGNOSIS — G43.109 MIGRAINE WITH AURA AND WITHOUT STATUS MIGRAINOSUS, NOT INTRACTABLE: Primary | ICD-10-CM

## 2021-11-22 DIAGNOSIS — I10 ESSENTIAL HYPERTENSION: ICD-10-CM

## 2021-11-22 PROCEDURE — 99214 OFFICE O/P EST MOD 30 MIN: CPT | Mod: S$PBB,,, | Performed by: NURSE PRACTITIONER

## 2021-11-22 PROCEDURE — 99214 PR OFFICE/OUTPT VISIT, EST, LEVL IV, 30-39 MIN: ICD-10-PCS | Mod: S$PBB,,, | Performed by: NURSE PRACTITIONER

## 2021-11-22 PROCEDURE — 99214 OFFICE O/P EST MOD 30 MIN: CPT | Mod: PBBFAC | Performed by: NURSE PRACTITIONER

## 2021-11-22 PROCEDURE — 99999 PR PBB SHADOW E&M-EST. PATIENT-LVL IV: CPT | Mod: PBBFAC,,, | Performed by: NURSE PRACTITIONER

## 2021-11-22 PROCEDURE — 99999 PR PBB SHADOW E&M-EST. PATIENT-LVL IV: ICD-10-PCS | Mod: PBBFAC,,, | Performed by: NURSE PRACTITIONER

## 2021-11-24 ENCOUNTER — CLINICAL SUPPORT (OUTPATIENT)
Dept: CARDIOLOGY | Facility: HOSPITAL | Age: 81
End: 2021-11-24
Attending: INTERNAL MEDICINE
Payer: MEDICARE

## 2021-11-24 DIAGNOSIS — R00.2 PALPITATIONS: ICD-10-CM

## 2021-11-24 PROCEDURE — 93272 CARDIAC EVENT MONITOR (CUPID ONLY): ICD-10-PCS | Mod: ,,, | Performed by: STUDENT IN AN ORGANIZED HEALTH CARE EDUCATION/TRAINING PROGRAM

## 2021-11-24 PROCEDURE — 93272 ECG/REVIEW INTERPRET ONLY: CPT | Mod: ,,, | Performed by: STUDENT IN AN ORGANIZED HEALTH CARE EDUCATION/TRAINING PROGRAM

## 2021-11-24 PROCEDURE — 93270 REMOTE 30 DAY ECG REV/REPORT: CPT

## 2021-11-30 ENCOUNTER — HOSPITAL ENCOUNTER (OUTPATIENT)
Dept: CARDIOLOGY | Facility: OTHER | Age: 81
Discharge: HOME | End: 2021-11-30
Attending: INTERNAL MEDICINE
Payer: MEDICARE

## 2021-11-30 VITALS
HEART RATE: 78 BPM | HEIGHT: 62 IN | SYSTOLIC BLOOD PRESSURE: 149 MMHG | WEIGHT: 189 LBS | BODY MASS INDEX: 34.78 KG/M2 | DIASTOLIC BLOOD PRESSURE: 79 MMHG

## 2021-11-30 DIAGNOSIS — I10 ESSENTIAL HYPERTENSION: ICD-10-CM

## 2021-11-30 PROCEDURE — 93306 TTE W/DOPPLER COMPLETE: CPT

## 2021-11-30 PROCEDURE — 93306 TTE W/DOPPLER COMPLETE: CPT | Mod: 26,,, | Performed by: INTERNAL MEDICINE

## 2021-11-30 PROCEDURE — 93306 ECHO (CUPID ONLY): ICD-10-PCS | Mod: 26,,, | Performed by: INTERNAL MEDICINE

## 2021-12-01 LAB
ASCENDING AORTA: 2.68 CM
AV INDEX (PROSTH): 0.93
AV MEAN GRADIENT: 5 MMHG
AV PEAK GRADIENT: 9 MMHG
AV REGURGITATION PRESSURE HALF TIME: 823 MS
AV VALVE AREA: 2.25 CM2
AV VELOCITY RATIO: 0.86
BSA FOR ECHO PROCEDURE: 1.94 M2
CV ECHO LV RWT: 0.43 CM
DOP CALC AO PEAK VEL: 1.52 M/S
DOP CALC AO VTI: 31.52 CM
DOP CALC LVOT AREA: 2.4 CM2
DOP CALC LVOT DIAMETER: 1.76 CM
DOP CALC LVOT PEAK VEL: 1.31 M/S
DOP CALC LVOT STROKE VOLUME: 71.08 CM3
DOP CALCLVOT PEAK VEL VTI: 29.23 CM
E WAVE DECELERATION TIME: 277.62 MSEC
E/A RATIO: 0.73
E/E' RATIO: 10.29 M/S
ECHO LV POSTERIOR WALL: 0.99 CM (ref 0.6–1.1)
EJECTION FRACTION: 76 %
FRACTIONAL SHORTENING: 45 % (ref 28–44)
INTERVENTRICULAR SEPTUM: 0.92 CM (ref 0.6–1.1)
IVRT: 88.72 MSEC
LA MAJOR: 4.81 CM
LA MINOR: 4.68 CM
LA WIDTH: 3.42 CM
LEFT ATRIUM SIZE: 3.24 CM
LEFT ATRIUM VOLUME INDEX MOD: 23.5 ML/M2
LEFT ATRIUM VOLUME INDEX: 23.9 ML/M2
LEFT ATRIUM VOLUME MOD: 44 CM3
LEFT ATRIUM VOLUME: 44.68 CM3
LEFT INTERNAL DIMENSION IN SYSTOLE: 2.56 CM (ref 2.1–4)
LEFT VENTRICLE DIASTOLIC VOLUME INDEX: 53.35 ML/M2
LEFT VENTRICLE DIASTOLIC VOLUME: 99.76 ML
LEFT VENTRICLE MASS INDEX: 81 G/M2
LEFT VENTRICLE SYSTOLIC VOLUME INDEX: 12.7 ML/M2
LEFT VENTRICLE SYSTOLIC VOLUME: 23.76 ML
LEFT VENTRICULAR INTERNAL DIMENSION IN DIASTOLE: 4.65 CM (ref 3.5–6)
LEFT VENTRICULAR MASS: 151.82 G
LV LATERAL E/E' RATIO: 9 M/S
LV SEPTAL E/E' RATIO: 12 M/S
MV A" WAVE DURATION": 10.35 MSEC
MV PEAK A VEL: 0.98 M/S
MV PEAK E VEL: 0.72 M/S
MV STENOSIS PRESSURE HALF TIME: 80.51 MS
MV VALVE AREA P 1/2 METHOD: 2.73 CM2
PISA MRMAX VEL: 0.05 M/S
PISA TR MAX VEL: 1.73 M/S
PULM VEIN S/D RATIO: 1.35
PV PEAK D VEL: 0.34 M/S
PV PEAK S VEL: 0.46 M/S
PV PEAK VELOCITY: 0.78 CM/S
RA MAJOR: 4.41 CM
RA PRESSURE: 3 MMHG
RA WIDTH: 3.07 CM
RIGHT VENTRICULAR END-DIASTOLIC DIMENSION: 2.74 CM
RV TISSUE DOPPLER FREE WALL SYSTOLIC VELOCITY 1 (APICAL 4 CHAMBER VIEW): 11.25 CM/S
SINUS: 2.8 CM
STJ: 2.62 CM
TDI LATERAL: 0.08 M/S
TDI SEPTAL: 0.06 M/S
TDI: 0.07 M/S
TR MAX PG: 12 MMHG
TRICUSPID ANNULAR PLANE SYSTOLIC EXCURSION: 2.1 CM
TV REST PULMONARY ARTERY PRESSURE: 15 MMHG

## 2021-12-15 ENCOUNTER — HOSPITAL ENCOUNTER (OUTPATIENT)
Dept: RADIOLOGY | Facility: OTHER | Age: 81
Discharge: HOME | End: 2021-12-15
Attending: INTERNAL MEDICINE
Payer: MEDICARE

## 2021-12-15 DIAGNOSIS — R10.32 LEFT LOWER QUADRANT PAIN: ICD-10-CM

## 2021-12-15 PROCEDURE — 74177 CT ABD & PELVIS W/CONTRAST: CPT | Mod: 26,,, | Performed by: RADIOLOGY

## 2021-12-15 PROCEDURE — 74177 CT ABDOMEN PELVIS WITH CONTRAST: ICD-10-PCS | Mod: 26,,, | Performed by: RADIOLOGY

## 2021-12-15 PROCEDURE — A9698 NON-RAD CONTRAST MATERIALNOC: HCPCS | Performed by: INTERNAL MEDICINE

## 2021-12-15 PROCEDURE — 25500020 PHARM REV CODE 255: Performed by: INTERNAL MEDICINE

## 2021-12-15 PROCEDURE — 74177 CT ABD & PELVIS W/CONTRAST: CPT | Mod: TC

## 2021-12-15 RX ADMIN — IOHEXOL 1000 ML: 9 SOLUTION ORAL at 05:12

## 2021-12-15 RX ADMIN — IOHEXOL 75 ML: 350 INJECTION, SOLUTION INTRAVENOUS at 06:12

## 2021-12-29 ENCOUNTER — PATIENT MESSAGE (OUTPATIENT)
Dept: CARDIOLOGY | Facility: CLINIC | Age: 81
End: 2021-12-29
Payer: MEDICARE

## 2022-01-21 ENCOUNTER — OFFICE VISIT (OUTPATIENT)
Dept: CARDIOLOGY | Facility: CLINIC | Age: 82
End: 2022-01-21
Payer: MEDICARE

## 2022-01-21 VITALS
OXYGEN SATURATION: 98 % | SYSTOLIC BLOOD PRESSURE: 142 MMHG | BODY MASS INDEX: 34.96 KG/M2 | WEIGHT: 190 LBS | HEART RATE: 81 BPM | DIASTOLIC BLOOD PRESSURE: 91 MMHG | HEIGHT: 62 IN

## 2022-01-21 DIAGNOSIS — I10 ESSENTIAL HYPERTENSION: Primary | ICD-10-CM

## 2022-01-21 PROCEDURE — 99999 PR PBB SHADOW E&M-EST. PATIENT-LVL IV: ICD-10-PCS | Mod: PBBFAC,,, | Performed by: INTERNAL MEDICINE

## 2022-01-21 PROCEDURE — 99214 OFFICE O/P EST MOD 30 MIN: CPT | Mod: PBBFAC | Performed by: INTERNAL MEDICINE

## 2022-01-21 PROCEDURE — 99999 PR PBB SHADOW E&M-EST. PATIENT-LVL IV: CPT | Mod: PBBFAC,,, | Performed by: INTERNAL MEDICINE

## 2022-01-21 PROCEDURE — 99213 OFFICE O/P EST LOW 20 MIN: CPT | Mod: S$PBB,,, | Performed by: INTERNAL MEDICINE

## 2022-01-21 PROCEDURE — 99213 PR OFFICE/OUTPT VISIT, EST, LEVL III, 20-29 MIN: ICD-10-PCS | Mod: S$PBB,,, | Performed by: INTERNAL MEDICINE

## 2022-01-21 RX ORDER — MAGNESIUM 200 MG
400 TABLET ORAL ONCE
COMMUNITY

## 2022-01-21 RX ORDER — OMEGA-3 FATTY ACIDS 1000 MG
CAPSULE ORAL
COMMUNITY

## 2022-01-21 NOTE — PROGRESS NOTES
Cardiology    1/21/2022  10:21 AM    Problem list  Patient Active Problem List   Diagnosis    Hiatal hernia    Depression    Elevated blood sugar    Age-related osteoporosis without current pathological fracture    Chest pain    Essential hypertension    BMI 34.0-34.9,adult    Migraine with aura and without status migrainosus, not intractable       CC:  No complaints    HPI:  Did not sleep well last night because she was working on her taxes for her  on the computer.  Has been up since 2 AM.  Found out that her symptoms were due to sucrafate which stopped after stopping this medication.  Her SBP have been ranging 126-151 according to her Omron machine which is 20mmHg higher than our appropriately sized manual cuff.    Medications  Current Outpatient Medications   Medication Sig Dispense Refill    ascorbic acid, vitamin C, (VITAMIN C) 1000 MG tablet Take 1,000 mg by mouth once daily.      aspirin (ECOTRIN) 81 MG EC tablet Take 81 mg by mouth once daily.      co-enzyme Q-10 30 mg capsule Take 30 mg by mouth 3 (three) times daily.      DYMISTA 137-50 mcg/spray [place] nassal spray SPRAY ONCE IEN BID 1 Bottle 1    Lactobacillus rhamnosus GG (CULTURELLE) 10 billion cell capsule Take 1 capsule by mouth once daily.      magnesium 200 mg Tab Take 400 mg by mouth once.      multivitamin (THERAGRAN) per tablet Take 1 tablet by mouth once daily.      omega-3 fatty acids (SUPER OMEGA-3) 1,000 mg Cap Take by mouth.      pantoprazole (PROTONIX) 40 MG tablet TAKE 1 TABLET(40 MG) BY MOUTH EVERY DAY 90 tablet 1    vitamin D (VITAMIN D3) 1000 units Tab Take 1,000 Units by mouth once daily.      cetirizine (ZYRTEC) 10 MG tablet Take 10 mg by mouth once daily.      LORazepam (ATIVAN) 0.5 MG tablet Take 1 tablet (0.5 mg total) by mouth every 6 (six) hours as needed for Anxiety. 20 tablet 0     No current facility-administered medications for this visit.      Prior to Admission medications    Medication  Sig Start Date End Date Taking? Authorizing Provider   ascorbic acid, vitamin C, (VITAMIN C) 1000 MG tablet Take 1,000 mg by mouth once daily.   Yes Historical Provider   aspirin (ECOTRIN) 81 MG EC tablet Take 81 mg by mouth once daily.   Yes Historical Provider   co-enzyme Q-10 30 mg capsule Take 30 mg by mouth 3 (three) times daily.   Yes Historical Provider   DYMISTA 137-50 mcg/spray [place] nassal spray SPRAY ONCE IEN BID 7/23/21  Yes Johanne Leonard MD   Lactobacillus rhamnosus GG (CULTURELLE) 10 billion cell capsule Take 1 capsule by mouth once daily.   Yes Historical Provider   magnesium 200 mg Tab Take 400 mg by mouth once.   Yes Historical Provider   multivitamin (THERAGRAN) per tablet Take 1 tablet by mouth once daily.   Yes Historical Provider   omega-3 fatty acids (SUPER OMEGA-3) 1,000 mg Cap Take by mouth.   Yes Historical Provider   pantoprazole (PROTONIX) 40 MG tablet TAKE 1 TABLET(40 MG) BY MOUTH EVERY DAY 1/19/22  Yes Johanne Leonard MD   vitamin D (VITAMIN D3) 1000 units Tab Take 1,000 Units by mouth once daily.   Yes Historical Provider   cetirizine (ZYRTEC) 10 MG tablet Take 10 mg by mouth once daily.    Historical Provider   LORazepam (ATIVAN) 0.5 MG tablet Take 1 tablet (0.5 mg total) by mouth every 6 (six) hours as needed for Anxiety. 11/12/21 12/12/21  Johanne Leonard MD         History  Past Medical History:   Diagnosis Date    Allergy     Essential hypertension 8/31/2020    Migraine     TB (pulmonary tuberculosis)      Past Surgical History:   Procedure Laterality Date    THORACENTESIS       Social History     Socioeconomic History    Marital status:    Tobacco Use    Smoking status: Never Smoker    Smokeless tobacco: Never Used   Substance and Sexual Activity    Alcohol use: Yes     Comment: socially    Drug use: Never         Allergies  Review of patient's allergies indicates:   Allergen Reactions    Amlodipine Other (See Comments)     Nausea, weakness,  cheryle    Aspirin     Cortisone     Losartan     Valsartan          Review of Systems   Review of Systems   Constitutional: Negative for decreased appetite, fever and weight loss.   HENT: Negative for congestion and nosebleeds.    Eyes: Negative for double vision, vision loss in left eye, vision loss in right eye and visual disturbance.   Cardiovascular: Negative for chest pain, claudication, cyanosis, dyspnea on exertion, irregular heartbeat, leg swelling, near-syncope, orthopnea, palpitations, paroxysmal nocturnal dyspnea and syncope.   Respiratory: Negative for cough, hemoptysis, shortness of breath, sleep disturbances due to breathing, snoring, sputum production and wheezing.    Endocrine: Negative for cold intolerance and heat intolerance.   Skin: Negative for nail changes and rash.   Musculoskeletal: Negative for joint pain, muscle cramps, muscle weakness and myalgias.   Gastrointestinal: Negative for change in bowel habit, heartburn, hematemesis, hematochezia, hemorrhoids and melena.   Neurological: Negative for dizziness, focal weakness and headaches.         Physical Exam  Wt Readings from Last 1 Encounters:   01/21/22 86.2 kg (190 lb)     BP Readings from Last 3 Encounters:   01/21/22 (!) 142/91   11/30/21 (!) 149/79   11/22/21 (!) 149/79     Pulse Readings from Last 1 Encounters:   01/21/22 81     Body mass index is 34.75 kg/m².    Physical Exam  Constitutional:       Appearance: She is well-developed.   HENT:      Head: Atraumatic.   Eyes:      General: No scleral icterus.  Neck:      Vascular: Normal carotid pulses. No carotid bruit, hepatojugular reflux or JVD.   Cardiovascular:      Rate and Rhythm: Normal rate and regular rhythm.      Chest Wall: PMI is not displaced.      Pulses: Intact distal pulses.           Carotid pulses are 2+ on the right side and 2+ on the left side.       Radial pulses are 2+ on the right side and 2+ on the left side.        Dorsalis pedis pulses are 2+ on the right side  and 2+ on the left side.      Heart sounds: Normal heart sounds, S1 normal and S2 normal. No murmur heard.  No friction rub.   Pulmonary:      Effort: Pulmonary effort is normal. No respiratory distress.      Breath sounds: Normal breath sounds. No stridor. No wheezing or rales.   Chest:      Chest wall: No tenderness.   Abdominal:      General: Bowel sounds are normal.      Palpations: Abdomen is soft.   Musculoskeletal:      Cervical back: Neck supple. No edema.   Skin:     General: Skin is warm and dry.      Nails: There is no clubbing.   Neurological:      Mental Status: She is alert and oriented to person, place, and time.   Psychiatric:         Behavior: Behavior normal.         Thought Content: Thought content normal.                   Assessment  1. Essential hypertension  Continue to monitor    2. BMI 34.0-34.9,adult  unchanged        Plan and Discussion  Continue to follow low Na diet.  Encourage to exercise at least 30 minutes per day, 5 days per week.      Follow Up  6 months      Jose Francisco Perez MD, F.A.C.C, F.S.C.A.I.

## 2022-01-24 ENCOUNTER — PATIENT MESSAGE (OUTPATIENT)
Dept: NEUROLOGY | Facility: CLINIC | Age: 82
End: 2022-01-24
Payer: MEDICARE

## 2022-04-11 NOTE — PROGRESS NOTES
Established Patient   SUBJECTIVE:  Patient ID: Annette Lynch   Chief Complaint: Follow-up    History of Present Illness:  Annette Lynch is a 81 y.o. female who presents to clinic alone for follow-up of headaches.     Recommendations made at last Office Visit on 11/22/21:  - Per patient, migraines are well controlled and easily treated with acetaminophen.  Will have her start magnesium oxide 400 mg daily for headache/migraine prevention   - Continue treating acute migraines with acetaminophen   - Begin tracking headaches diligently   - HTN - continue monitoring blood pressure regularly, management per PCP   - RTC in 2 months or sooner if needed     04/13/2022 - Interval History:  Symptoms determined to be related to sucralfate which has since been discontinued and symptoms have resolved.  Had a migraine two days ago following tense conversation with daughter, was resolved after taking tylenol and drinking a cup of coffee.  She has been tracking her migraines, last migraine tracked prior to two days ago was 3/5/22.   reports she suffers with more frequent headaches than she tracks.   is concerned she is taking tylenol too frequently.  Patient states she typically takes tylenol every other day or so.    Blood pressure elevated in clinic today, 154/89 mmHg.  Pt states she has been monitoring her blood pressure regularly at home and readings are typically much lower.  She is no longer taking lisinopril nor valsartan and requests medications be removed from med list.     Otherwise, information below is still accurate and current.     History of Present Illness:   81 y.o. female with migraines, anxiety, HTN, depression, who presents to clinic with her  for evaluation of headaches.  History is reported by both the patient and her .  Migraines began in childhood, initially diagnosed with migraine headaches after having her children.  Migraines typically preceded by visual aura, describes seeing a  ""renoscope".  Recently has been suffering with more frequent migraines, visual aura.  Typical triggers include certain foods, "stress", "emotions", being sick.  Currently suffering with a migraine anywhere from once per week up to every other day, depending on triggers.  Visual aura typically lasts less than an hour, will dissipate while she drinks coffee, she typically takes 2 tabs extra strength tylenol, migraine headache may or may not begin.  Associated symptoms include nausea, photophobia, phonophobia.    During October, she began experiencing episodes of shaking, dizziness, lightheadedness, headaches, elevated blood pressure.  Has not had recurrent episode since October.      Current Medications:    ascorbic acid, vitamin C, (VITAMIN C) 1000 MG tablet, Take 1,000 mg by mouth once daily., Disp: , Rfl:     aspirin (ECOTRIN) 81 MG EC tablet, Take 81 mg by mouth once daily., Disp: , Rfl:     cetirizine (ZYRTEC) 10 MG tablet, Take 10 mg by mouth once daily., Disp: , Rfl:     co-enzyme Q-10 30 mg capsule, Take 30 mg by mouth 3 (three) times daily., Disp: , Rfl:     DYMISTA 137-50 mcg/spray [place] spray, SPRAY ONCE IEN BID, Disp: 1 Bottle, Rfl: 1    famotidine (PEPCID) 10 MG tablet, Take 10 mg by mouth 2 (two) times daily., Disp: , Rfl:     Lactobacillus rhamnosus GG (CULTURELLE) 10 billion cell capsule, Take 1 capsule by mouth once daily., Disp: , Rfl:     magnesium 200 mg Tab, Take 400 mg by mouth once., Disp: , Rfl:     multivitamin (THERAGRAN) per tablet, Take 1 tablet by mouth once daily., Disp: , Rfl:     omega-3 fatty acids 1,000 mg Cap, Take by mouth., Disp: , Rfl:     pantoprazole (PROTONIX) 40 MG tablet, TAKE 1 TABLET(40 MG) BY MOUTH EVERY DAY, Disp: 90 tablet, Rfl: 1    sucralfate (CARAFATE) 1 gram tablet, Take 1 g by mouth 4 (four) times daily., Disp: , Rfl:     vitamin D (VITAMIN D3) 1000 units Tab, Take 1,000 Units by mouth once daily., Disp: , Rfl:     Review of Systems - A review " "of 10+ systems was conducted with pertinent positive and negative findings documented in HPI with all other systems reviewed and negative.    PFSH: Past medical, family, and social history reviewed as documented in chart with pertinent positive medical, family, and social history detailed in HPI.    OBJECTIVE:  Vitals:  BP (!) 154/89 (BP Location: Left arm, Patient Position: Sitting, BP Method: Large (Automatic))   Pulse 75   Ht 5' 2" (1.575 m)   Wt 89 kg (196 lb 3.4 oz)   BMI 35.89 kg/m²      Physical Exam:  Constitutional: she appears well-developed and well-nourished. she is well groomed. NAD    Review of Data:   Notes from cardiology, internal medicine reviewed   Labs:  Lab Visit on 12/15/2021   Component Date Value Ref Range Status    Total Protein 12/15/2021 7.0  6.0 - 8.4 g/dL Final    Albumin 12/15/2021 3.9  3.5 - 5.2 g/dL Final    Total Bilirubin 12/15/2021 0.6  0.1 - 1.0 mg/dL Final    Bilirubin, Direct 12/15/2021 0.3  0.1 - 0.3 mg/dL Final    AST 12/15/2021 20  10 - 40 U/L Final    ALT 12/15/2021 18  10 - 44 U/L Final    Alkaline Phosphatase 12/15/2021 69  55 - 135 U/L Final    Sodium 12/15/2021 141  136 - 145 mmol/L Final    Potassium 12/15/2021 4.5  3.5 - 5.1 mmol/L Final    Chloride 12/15/2021 105  95 - 110 mmol/L Final    CO2 12/15/2021 26  23 - 29 mmol/L Final    Glucose 12/15/2021 93  70 - 110 mg/dL Final    BUN 12/15/2021 11  8 - 23 mg/dL Final    Creatinine 12/15/2021 0.8  0.5 - 1.4 mg/dL Final    Calcium 12/15/2021 10.0  8.7 - 10.5 mg/dL Final    Anion Gap 12/15/2021 10  8 - 16 mmol/L Final    eGFR if African American 12/15/2021 >60  >60 mL/min/1.73 m^2 Final    eGFR if non African American 12/15/2021 >60  >60 mL/min/1.73 m^2 Final    WBC 12/15/2021 6.97  3.90 - 12.70 K/uL Final    RBC 12/15/2021 4.80  4.00 - 5.40 M/uL Final    Hemoglobin 12/15/2021 15.1  12.0 - 16.0 g/dL Final    Hematocrit 12/15/2021 45.6  37.0 - 48.5 % Final    MCV 12/15/2021 95  82 - 98 fL Final    " MCH 12/15/2021 31.5 (A) 27.0 - 31.0 pg Final    MCHC 12/15/2021 33.1  32.0 - 36.0 g/dL Final    RDW 12/15/2021 12.9  11.5 - 14.5 % Final    Platelets 12/15/2021 286  150 - 450 K/uL Final    MPV 12/15/2021 10.2  9.2 - 12.9 fL Final    Immature Granulocytes 12/15/2021 0.1  0.0 - 0.5 % Final    Gran # (ANC) 12/15/2021 4.5  1.8 - 7.7 K/uL Final    Immature Grans (Abs) 12/15/2021 0.01  0.00 - 0.04 K/uL Final    Lymph # 12/15/2021 1.8  1.0 - 4.8 K/uL Final    Mono # 12/15/2021 0.4  0.3 - 1.0 K/uL Final    Eos # 12/15/2021 0.2  0.0 - 0.5 K/uL Final    Baso # 12/15/2021 0.03  0.00 - 0.20 K/uL Final    nRBC 12/15/2021 0  0 /100 WBC Final    Gran % 12/15/2021 64.9  38.0 - 73.0 % Final    Lymph % 12/15/2021 26.1  18.0 - 48.0 % Final    Mono % 12/15/2021 6.3  4.0 - 15.0 % Final    Eosinophil % 12/15/2021 2.2  0.0 - 8.0 % Final    Basophil % 12/15/2021 0.4  0.0 - 1.9 % Final    Differential Method 12/15/2021 Automated   Final   Hospital Outpatient Visit on 11/30/2021   Component Date Value Ref Range Status    Ascending aorta 11/30/2021 2.68  cm Final    STJ 11/30/2021 2.62  cm Final    AV mean gradient 11/30/2021 5  mmHg Final    Ao peak jonnie 11/30/2021 1.52  m/s Final    Ao VTI 11/30/2021 31.52  cm Final    IVRT 11/30/2021 88.72  msec Final    IVS 11/30/2021 0.92  0.6 - 1.1 cm Final    LA size 11/30/2021 3.24  cm Final    Left Atrium Major Axis 11/30/2021 4.81  cm Final    Left Atrium Minor Axis 11/30/2021 4.68  cm Final    LVIDd 11/30/2021 4.65  3.5 - 6.0 cm Final    LVIDs 11/30/2021 2.56  2.1 - 4.0 cm Final    LVOT diameter 11/30/2021 1.76  cm Final    LVOT peak VTI 11/30/2021 29.23  cm Final    Posterior Wall 11/30/2021 0.99  0.6 - 1.1 cm Final    MV Peak A Jonnie 11/30/2021 0.98  m/s Final    E wave deceleration time 11/30/2021 277.62  msec Final    MV Peak E Jonnie 11/30/2021 0.72  m/s Final    PV Peak D Jonnie 11/30/2021 0.34  m/s Final    PV Peak S Jonnie 11/30/2021 0.46  m/s Final    RA Major  "Axis 11/30/2021 4.41  cm Final    RA Width 11/30/2021 3.07  cm Final    RVDD 11/30/2021 2.74  cm Final    Sinus 11/30/2021 2.80  cm Final    TAPSE 11/30/2021 2.10  cm Final    TR Max Jonnie 11/30/2021 1.73  m/s Final    TDI LATERAL 11/30/2021 0.08  m/s Final    TDI SEPTAL 11/30/2021 0.06  m/s Final    LA WIDTH 11/30/2021 3.42  cm Final    PV PEAK VELOCITY 11/30/2021 0.78  cm/s Final    MV stenosis pressure 1/2 time 11/30/2021 80.51  ms Final    LV Diastolic Volume 11/30/2021 99.76  mL Final    LV Systolic Volume 11/30/2021 23.76  mL Final    RV S' 11/30/2021 11.25  cm/s Final    LVOT peak jonnie 11/30/2021 1.31  m/s Final    Mr max jonnie 11/30/2021 0.05  m/s Final    LA volume (mod) 11/30/2021 44.00  cm3 Final    MV "A" wave duration 11/30/2021 10.35  msec Final    LV LATERAL E/E' RATIO 11/30/2021 9.00  m/s Final    LV SEPTAL E/E' RATIO 11/30/2021 12.00  m/s Final    FS 11/30/2021 45  % Final    LA volume 11/30/2021 44.68  cm3 Final    LV mass 11/30/2021 151.82  g Final    Left Ventricle Relative Wall Thick* 11/30/2021 0.43  cm Final    AV valve area 11/30/2021 2.25  cm2 Final    AV Velocity Ratio 11/30/2021 0.86   Final    AV index (prosthetic) 11/30/2021 0.93   Final    MV valve area p 1/2 method 11/30/2021 2.73  cm2 Final    E/A ratio 11/30/2021 0.73   Final    Mean e' 11/30/2021 0.07  m/s Final    Pulm vein S/D ratio 11/30/2021 1.35   Final    LVOT area 11/30/2021 2.4  cm2 Final    LVOT stroke volume 11/30/2021 71.08  cm3 Final    AV peak gradient 11/30/2021 9  mmHg Final    E/E' ratio 11/30/2021 10.29  m/s Final    Triscuspid Valve Regurgitation Pea* 11/30/2021 12  mmHg Final    BSA 11/30/2021 1.94  m2 Final    Right Atrial Pressure (from IVC) 11/30/2021 3  mmHg Final    AV regurgitation pressure 1/2 time 11/30/2021 823  ms Final    TV rest pulmonary artery pressure 11/30/2021 15  mmHg Final    LV Systolic Volume Index 11/30/2021 12.7  mL/m2 Final    LV Diastolic Volume Index " 11/30/2021 53.35  mL/m2 Final    LA Volume Index 11/30/2021 23.9  mL/m2 Final    LV Mass Index 11/30/2021 81  g/m2 Final    LA Volume Index (Mod) 11/30/2021 23.5  mL/m2 Final    EF 11/30/2021 76  % Final   Lab Visit on 11/15/2021   Component Date Value Ref Range Status    Hours Collected 11/15/2021 24  hr Corrected    Urine Total Volume 11/15/2021 1900  mL Final    Creatinine, Urine - per volume 11/15/2021 44  mg/dL Final    Creatinine, urine - per 24 hours 11/15/2021 836  500 - 1400 mg/d Final    VMA, Urine - per volume 11/15/2021 2.1  mg/L Final    VMA, Urine 11/15/2021 5  0 - 6 mg/gCR Final    VMA, 24H Ur 11/15/2021 4.0  0.0 - 7.0 mg/d Final    VMA Interp. 11/15/2021 See Note   Final    Urine Total Volume 11/15/2021 1900  mL Corrected    Urine Collection Duration 11/15/2021 24  h Final    Metanephrines, 24H Ur 11/15/2021 76  mcg/24 h Final    Normetanephrine, 24H Ur 11/15/2021 211  mcg/24 h Final    Total Metanephrine, urine 11/15/2021 287  mcg/24 h Final    Comment, (Metanephrines) 11/15/2021 Test Not Performed   Final   Lab Visit on 11/12/2021   Component Date Value Ref Range Status    Sodium 11/12/2021 140  136 - 145 mmol/L Final    Potassium 11/12/2021 4.0  3.5 - 5.1 mmol/L Final    Chloride 11/12/2021 105  95 - 110 mmol/L Final    CO2 11/12/2021 24  23 - 29 mmol/L Final    Glucose 11/12/2021 96  70 - 110 mg/dL Final    BUN 11/12/2021 11  8 - 23 mg/dL Final    Creatinine 11/12/2021 0.7  0.5 - 1.4 mg/dL Final    Calcium 11/12/2021 9.4  8.7 - 10.5 mg/dL Final    Total Protein 11/12/2021 7.1  6.0 - 8.4 g/dL Final    Albumin 11/12/2021 3.9  3.5 - 5.2 g/dL Final    Total Bilirubin 11/12/2021 0.6  0.1 - 1.0 mg/dL Final    Alkaline Phosphatase 11/12/2021 59  55 - 135 U/L Final    AST 11/12/2021 18  10 - 40 U/L Final    ALT 11/12/2021 19  10 - 44 U/L Final    Anion Gap 11/12/2021 11  8 - 16 mmol/L Final    eGFR if African American 11/12/2021 >60  >60 mL/min/1.73 m^2 Final    eGFR if  non  11/12/2021 >60  >60 mL/min/1.73 m^2 Final    WBC 11/12/2021 5.68  3.90 - 12.70 K/uL Final    RBC 11/12/2021 4.77  4.00 - 5.40 M/uL Final    Hemoglobin 11/12/2021 14.8  12.0 - 16.0 g/dL Final    Hematocrit 11/12/2021 45.6  37.0 - 48.5 % Final    MCV 11/12/2021 96  82 - 98 fL Final    MCH 11/12/2021 31.0  27.0 - 31.0 pg Final    MCHC 11/12/2021 32.5  32.0 - 36.0 g/dL Final    RDW 11/12/2021 12.6  11.5 - 14.5 % Final    Platelets 11/12/2021 274  150 - 450 K/uL Final    MPV 11/12/2021 10.2  9.2 - 12.9 fL Final    Immature Granulocytes 11/12/2021 0.2  0.0 - 0.5 % Final    Gran # (ANC) 11/12/2021 3.3  1.8 - 7.7 K/uL Final    Immature Grans (Abs) 11/12/2021 0.01  0.00 - 0.04 K/uL Final    Lymph # 11/12/2021 1.9  1.0 - 4.8 K/uL Final    Mono # 11/12/2021 0.3  0.3 - 1.0 K/uL Final    Eos # 11/12/2021 0.2  0.0 - 0.5 K/uL Final    Baso # 11/12/2021 0.03  0.00 - 0.20 K/uL Final    nRBC 11/12/2021 0  0 /100 WBC Final    Gran % 11/12/2021 58.1  38.0 - 73.0 % Final    Lymph % 11/12/2021 32.7  18.0 - 48.0 % Final    Mono % 11/12/2021 5.5  4.0 - 15.0 % Final    Eosinophil % 11/12/2021 3.0  0.0 - 8.0 % Final    Basophil % 11/12/2021 0.5  0.0 - 1.9 % Final    Differential Method 11/12/2021 Automated   Final    Hemoglobin A1C 11/12/2021 5.6  4.0 - 5.6 % Final    Estimated Avg Glucose 11/12/2021 114  68 - 131 mg/dL Final    TSH 11/12/2021 0.553  0.400 - 4.000 uIU/mL Final    Free T4 11/12/2021 1.02  0.71 - 1.51 ng/dL Final    Vitamin B-12 11/12/2021 1593 (A) 210 - 950 pg/mL Final    Thyroperoxidase Antibodies 11/12/2021 <6.0  <6.0 IU/mL Final    Magnesium 11/12/2021 2.0  1.6 - 2.6 mg/dL Final   Office Visit on 11/08/2021   Component Date Value Ref Range Status    POC Sodium 11/08/2021 140  138 - 146 MMOL/L Final    POC Potassium 11/08/2021 3.7  3.5 - 4.9 MMOL/L Final    POC Chloride 11/08/2021 104  98 - 109 MMOL/L Final    POC BUN 11/08/2021 13  8 - 26 MMOL/L Final    POC Glucose  11/08/2021 103  70 - 110 MG/DL Final    POC Creatinine 11/08/2021 0.6  0.6 - 1.3 mg/dL Final    POC iCA 11/08/2021 1.19  1.12 - 1.32 MMOL/L Final    POC TCO2 11/08/2021 27  24 - 29 MMOL/L Final    POC Hematocrit 11/08/2021 48 (A) 37 - 47 %PCV Final    POC Hemoglobin 11/08/2021 16.3 (A) 12.5 - 16 g/dL Final    POC Anion Gap 11/08/2021 14  10.0 - 20 MMOL/L Final   Lab Visit on 10/14/2021   Component Date Value Ref Range Status    Sed Rate 10/14/2021 9  0 - 20 mm/Hr Final    Sodium 10/14/2021 140  136 - 145 mmol/L Final    Potassium 10/14/2021 3.7  3.5 - 5.1 mmol/L Final    Chloride 10/14/2021 105  95 - 110 mmol/L Final    CO2 10/14/2021 26  23 - 29 mmol/L Final    Glucose 10/14/2021 74  70 - 110 mg/dL Final    BUN 10/14/2021 16  8 - 23 mg/dL Final    Creatinine 10/14/2021 0.8  0.5 - 1.4 mg/dL Final    Calcium 10/14/2021 9.7  8.7 - 10.5 mg/dL Final    Anion Gap 10/14/2021 9  8 - 16 mmol/L Final    eGFR if African American 10/14/2021 >60  >60 mL/min/1.73 m^2 Final    eGFR if non African American 10/14/2021 >60  >60 mL/min/1.73 m^2 Final    WBC 10/14/2021 7.02  3.90 - 12.70 K/uL Final    RBC 10/14/2021 4.67  4.00 - 5.40 M/uL Final    Hemoglobin 10/14/2021 14.8  12.0 - 16.0 g/dL Final    Hematocrit 10/14/2021 44.8  37.0 - 48.5 % Final    MCV 10/14/2021 96  82 - 98 fL Final    MCH 10/14/2021 31.7 (A) 27.0 - 31.0 pg Final    MCHC 10/14/2021 33.0  32.0 - 36.0 g/dL Final    RDW 10/14/2021 13.3  11.5 - 14.5 % Final    Platelets 10/14/2021 272  150 - 450 K/uL Final    MPV 10/14/2021 10.3  9.2 - 12.9 fL Final    Immature Granulocytes 10/14/2021 0.1  0.0 - 0.5 % Final    Gran # (ANC) 10/14/2021 3.8  1.8 - 7.7 K/uL Final    Immature Grans (Abs) 10/14/2021 0.01  0.00 - 0.04 K/uL Final    Lymph # 10/14/2021 2.3  1.0 - 4.8 K/uL Final    Mono # 10/14/2021 0.6  0.3 - 1.0 K/uL Final    Eos # 10/14/2021 0.2  0.0 - 0.5 K/uL Final    Baso # 10/14/2021 0.05  0.00 - 0.20 K/uL Final    nRBC 10/14/2021 0  0  /100 WBC Final    Gran % 10/14/2021 54.7  38.0 - 73.0 % Final    Lymph % 10/14/2021 33.0  18.0 - 48.0 % Final    Mono % 10/14/2021 8.1  4.0 - 15.0 % Final    Eosinophil % 10/14/2021 3.4  0.0 - 8.0 % Final    Basophil % 10/14/2021 0.7  0.0 - 1.9 % Final    Differential Method 10/14/2021 Automated   Final    TSH 10/14/2021 0.893  0.400 - 4.000 uIU/mL Final    Cortisol, 8 AM 10/14/2021 15.60  4.30 - 22.40 ug/dL Final     Imaging:  Results for orders placed or performed during the hospital encounter of 08/04/21   CT Head Without Contrast    Narrative    EXAMINATION:  CT OF THE HEAD WITHOUT    CLINICAL HISTORY:  Altered mental status;    TECHNIQUE:  5 mm unenhanced axial images were obtained from the skull base to the vertex.    COMPARISON:  None.    FINDINGS:  Moderate cerebral atrophy and mild chronic small vessel ischemic changes are present.  There is focal area of hypodensity in the right frontal lobe, which may be encephalomalacia change from prior ischemic insult.  There is no acute intracranial hemorrhage, territorial infarct or mass effect, or midline shift. The visualized paranasal sinuses and mastoid air cells are clear.      Impression    No acute intracranial abnormality detected.  Question possible remote right frontal ischemic event.      Electronically signed by: Elisha Hall  Date:    08/04/2021  Time:    17:10     Note: I have independently reviewed any/all imaging/labs/tests and agree with the report (s) as documented.  Any discrepancies will be as noted/demarcated by free text.  DONNIE, TOMASA-C 4/13/2022    ASSESSMENT:  1. Migraine with aura and without status migrainosus, not intractable    2. Essential hypertension      PLAN:  - Continue tracking migraines, advised if frequency > 4 per month would recommend starting daily therapy for migraine prevention   - For acute migraine - OTC tylenol as needed   - Would recommend limiting use of tylenol to no more than 10 days per month, no more than  3000 mg/day    - Continue tracking headaches   - HTN - BP elevated in clinic today, continue monitoring blood pressure regularly at home, keep f/up appts with primary care and cardiology for further management   - RTC as needed      Questions and concerns were sought and answered to the patient's stated verbal satisfaction.  The patient verbalizes understanding and agreement with the above stated treatment plan.     CC: MD Kerri Pro, FNP-C  [facility]   [phone]    Dr. Lowe was available during today's encounter.

## 2022-04-13 ENCOUNTER — OFFICE VISIT (OUTPATIENT)
Dept: NEUROLOGY | Facility: CLINIC | Age: 82
End: 2022-04-13
Payer: MEDICARE

## 2022-04-13 VITALS
DIASTOLIC BLOOD PRESSURE: 89 MMHG | BODY MASS INDEX: 36.1 KG/M2 | HEART RATE: 75 BPM | HEIGHT: 62 IN | SYSTOLIC BLOOD PRESSURE: 154 MMHG | WEIGHT: 196.19 LBS

## 2022-04-13 DIAGNOSIS — I10 ESSENTIAL HYPERTENSION: ICD-10-CM

## 2022-04-13 DIAGNOSIS — G43.109 MIGRAINE WITH AURA AND WITHOUT STATUS MIGRAINOSUS, NOT INTRACTABLE: Primary | ICD-10-CM

## 2022-04-13 PROCEDURE — 99213 OFFICE O/P EST LOW 20 MIN: CPT | Mod: PBBFAC | Performed by: NURSE PRACTITIONER

## 2022-04-13 PROCEDURE — 99214 PR OFFICE/OUTPT VISIT, EST, LEVL IV, 30-39 MIN: ICD-10-PCS | Mod: S$PBB,,, | Performed by: NURSE PRACTITIONER

## 2022-04-13 PROCEDURE — 99999 PR PBB SHADOW E&M-EST. PATIENT-LVL III: ICD-10-PCS | Mod: PBBFAC,,, | Performed by: NURSE PRACTITIONER

## 2022-04-13 PROCEDURE — 99999 PR PBB SHADOW E&M-EST. PATIENT-LVL III: CPT | Mod: PBBFAC,,, | Performed by: NURSE PRACTITIONER

## 2022-04-13 PROCEDURE — 99214 OFFICE O/P EST MOD 30 MIN: CPT | Mod: S$PBB,,, | Performed by: NURSE PRACTITIONER

## 2022-07-01 ENCOUNTER — OFFICE VISIT (OUTPATIENT)
Dept: CARDIOLOGY | Facility: CLINIC | Age: 82
End: 2022-07-01
Payer: MEDICARE

## 2022-07-01 VITALS — DIASTOLIC BLOOD PRESSURE: 78 MMHG | HEART RATE: 66 BPM | OXYGEN SATURATION: 97 % | SYSTOLIC BLOOD PRESSURE: 120 MMHG

## 2022-07-01 DIAGNOSIS — I10 ESSENTIAL HYPERTENSION: Primary | ICD-10-CM

## 2022-07-01 PROCEDURE — 99999 PR PBB SHADOW E&M-EST. PATIENT-LVL III: CPT | Mod: PBBFAC,,, | Performed by: INTERNAL MEDICINE

## 2022-07-01 PROCEDURE — 99213 OFFICE O/P EST LOW 20 MIN: CPT | Mod: PBBFAC | Performed by: INTERNAL MEDICINE

## 2022-07-01 PROCEDURE — 99999 PR PBB SHADOW E&M-EST. PATIENT-LVL III: ICD-10-PCS | Mod: PBBFAC,,, | Performed by: INTERNAL MEDICINE

## 2022-07-01 PROCEDURE — 99213 OFFICE O/P EST LOW 20 MIN: CPT | Mod: S$PBB,,, | Performed by: INTERNAL MEDICINE

## 2022-07-01 PROCEDURE — 99213 PR OFFICE/OUTPT VISIT, EST, LEVL III, 20-29 MIN: ICD-10-PCS | Mod: S$PBB,,, | Performed by: INTERNAL MEDICINE

## 2022-07-01 NOTE — PROGRESS NOTES
Cardiology    7/1/2022  9:37 AM    Problem list  Patient Active Problem List   Diagnosis    Hiatal hernia    Depression    Elevated blood sugar    Age-related osteoporosis without current pathological fracture    Chest pain    Essential hypertension    BMI 34.0-34.9,adult    Migraine with aura and without status migrainosus, not intractable       CC:  Follow-up    HPI:  Yesterday, she woke up feeling dizzy and went to see her PCP who diagnosed with vertigo and gave her my cousin.  Meclizine did help.  Her blood pressure has been well controlled at home without any medication.  Yesterday when she was dizzy, her systolic blood pressure is 144.  She has been exercising at the gym doing the stationary bicycle or the elliptical for 10 minutes.    Medications  Current Outpatient Medications   Medication Sig Dispense Refill    ascorbic acid, vitamin C, (VITAMIN C) 1000 MG tablet Take 1,000 mg by mouth once daily.      aspirin (ECOTRIN) 81 MG EC tablet Take 81 mg by mouth once daily.      co-enzyme Q-10 30 mg capsule Take 30 mg by mouth 3 (three) times daily.      DYMISTA 137-50 mcg/spray [place] nassal spray SPRAY ONCE IEN BID 1 Bottle 1    famotidine (PEPCID) 10 MG tablet Take 10 mg by mouth 2 (two) times daily.      magnesium 200 mg Tab Take 400 mg by mouth once.      meclizine (ANTIVERT) 12.5 mg tablet Take 1 tablet (12.5 mg total) by mouth 3 (three) times daily as needed. 21 tablet 0    multivitamin (THERAGRAN) per tablet Take 1 tablet by mouth once daily.      mupirocin calcium 2% (BACTROBAN) 2 % cream Apply topically 3 (three) times daily. 1 each 1    omega-3 fatty acids 1,000 mg Cap Take by mouth.      pantoprazole (PROTONIX) 40 MG tablet TAKE 1 TABLET(40 MG) BY MOUTH EVERY DAY 90 tablet 1    sucralfate (CARAFATE) 1 gram tablet Take 1 tablet (1 g total) by mouth 4 (four) times daily as needed. 45 tablet 2    vitamin D (VITAMIN D3) 1000 units Tab Take 1,000 Units by mouth once daily.       cetirizine (ZYRTEC) 10 MG tablet Take 10 mg by mouth once daily.      ferrous sulfate 220 mg (44 mg iron)/5 mL solution ferrous sulfate 220 mg (44 mg iron)/5 mL oral solution   TAKE 10 ML MIXED IN ORANGE JUICE 30 MINUTES PRIOR TO BREAKFAST      perphenazine-amitriptyline 2-25 mg 2-25 mg Tab perphenazine-amitriptyline 2 mg-25 mg tablet      trimethobenzamide (TIGAN) 300 mg capsule trimethobenzamide 300 mg capsule   TK 1 C PO Q 8 H PRF NAUSEA       No current facility-administered medications for this visit.      Prior to Admission medications    Medication Sig Start Date End Date Taking? Authorizing Provider   ascorbic acid, vitamin C, (VITAMIN C) 1000 MG tablet Take 1,000 mg by mouth once daily.   Yes Historical Provider   aspirin (ECOTRIN) 81 MG EC tablet Take 81 mg by mouth once daily.   Yes Historical Provider   co-enzyme Q-10 30 mg capsule Take 30 mg by mouth 3 (three) times daily.   Yes Historical Provider   DYMISTA 137-50 mcg/spray [place] nassal spray SPRAY ONCE IEN BID 7/23/21  Yes Johanne Leonard MD   famotidine (PEPCID) 10 MG tablet Take 10 mg by mouth 2 (two) times daily.   Yes Historical Provider   magnesium 200 mg Tab Take 400 mg by mouth once.   Yes Historical Provider   meclizine (ANTIVERT) 12.5 mg tablet Take 1 tablet (12.5 mg total) by mouth 3 (three) times daily as needed. 6/30/22  Yes Juanito Monique MD   multivitamin (THERAGRAN) per tablet Take 1 tablet by mouth once daily.   Yes Historical Provider   mupirocin calcium 2% (BACTROBAN) 2 % cream Apply topically 3 (three) times daily. 4/19/22  Yes Johanne Leonard MD   omega-3 fatty acids 1,000 mg Cap Take by mouth.   Yes Historical Provider   pantoprazole (PROTONIX) 40 MG tablet TAKE 1 TABLET(40 MG) BY MOUTH EVERY DAY 1/19/22  Yes Johanne Leonard MD   sucralfate (CARAFATE) 1 gram tablet Take 1 tablet (1 g total) by mouth 4 (four) times daily as needed. 4/19/22  Yes Johanne Leonard MD   vitamin D (VITAMIN D3) 1000 units Tab  Take 1,000 Units by mouth once daily.   Yes Historical Provider   cetirizine (ZYRTEC) 10 MG tablet Take 10 mg by mouth once daily.    Historical Provider   ferrous sulfate 220 mg (44 mg iron)/5 mL solution ferrous sulfate 220 mg (44 mg iron)/5 mL oral solution   TAKE 10 ML MIXED IN ORANGE JUICE 30 MINUTES PRIOR TO BREAKFAST    Historical Provider   perphenazine-amitriptyline 2-25 mg 2-25 mg Tab perphenazine-amitriptyline 2 mg-25 mg tablet    Historical Provider   trimethobenzamide (TIGAN) 300 mg capsule trimethobenzamide 300 mg capsule   TK 1 C PO Q 8 H PRF NAUSEA    Historical Provider   clonazePAM (KLONOPIN) 0.5 MG tablet clonazepam 0.5 mg tablet  7/1/22  Historical Provider   diazePAM (VALIUM) 2 MG tablet diazepam 2 mg tablet  7/1/22  Historical Provider   EScitalopram oxalate (LEXAPRO) 10 MG tablet escitalopram 10 mg tablet  7/1/22  Historical Provider   Lactobacillus rhamnosus GG (CULTURELLE) 10 billion cell capsule Take 1 capsule by mouth once daily.  7/1/22  Historical Provider   predniSONE (DELTASONE) 10 MG tablet prednisone 10 mg tablet  7/1/22  Historical Provider         History  Past Medical History:   Diagnosis Date    Allergy     Essential hypertension 8/31/2020    Migraine     TB (pulmonary tuberculosis)      Past Surgical History:   Procedure Laterality Date    THORACENTESIS       Social History     Socioeconomic History    Marital status:    Tobacco Use    Smoking status: Never Smoker    Smokeless tobacco: Never Used   Substance and Sexual Activity    Alcohol use: Yes     Comment: socially    Drug use: Never         Allergies  Review of patient's allergies indicates:   Allergen Reactions    Hydrocortisone Swelling    Aspirin      Pt is okay to take 81mg daily    Cortisone          Review of Systems   Review of Systems   Constitutional: Negative for decreased appetite, fever and weight loss.   HENT: Negative for congestion and nosebleeds.    Eyes: Negative for double vision,  vision loss in left eye, vision loss in right eye and visual disturbance.   Cardiovascular: Negative for chest pain, claudication, cyanosis, dyspnea on exertion, irregular heartbeat, leg swelling, near-syncope, orthopnea, palpitations, paroxysmal nocturnal dyspnea and syncope.   Respiratory: Negative for cough, hemoptysis, shortness of breath, sleep disturbances due to breathing, snoring, sputum production and wheezing.    Endocrine: Negative for cold intolerance and heat intolerance.   Skin: Negative for nail changes and rash.   Musculoskeletal: Negative for joint pain, muscle cramps, muscle weakness and myalgias.   Gastrointestinal: Negative for change in bowel habit, heartburn, hematemesis, hematochezia, hemorrhoids and melena.   Neurological: Negative for dizziness, focal weakness and headaches.         Physical Exam  Wt Readings from Last 1 Encounters:   06/30/22 85.3 kg (188 lb)     BP Readings from Last 3 Encounters:   07/01/22 120/78   06/30/22 132/76   04/19/22 (!) 140/88     Pulse Readings from Last 1 Encounters:   07/01/22 66     There is no height or weight on file to calculate BMI.    Physical Exam  Constitutional:       Appearance: She is well-developed.   HENT:      Head: Atraumatic.   Eyes:      General: No scleral icterus.  Neck:      Vascular: Normal carotid pulses. No carotid bruit, hepatojugular reflux or JVD.   Cardiovascular:      Rate and Rhythm: Normal rate and regular rhythm.      Chest Wall: PMI is not displaced.      Pulses: Intact distal pulses.           Carotid pulses are 2+ on the right side and 2+ on the left side.       Radial pulses are 2+ on the right side and 2+ on the left side.        Dorsalis pedis pulses are 2+ on the right side and 2+ on the left side.      Heart sounds: Normal heart sounds, S1 normal and S2 normal. No murmur heard.    No friction rub.   Pulmonary:      Effort: Pulmonary effort is normal. No respiratory distress.      Breath sounds: Normal breath sounds. No  stridor. No wheezing or rales.   Chest:      Chest wall: No tenderness.   Abdominal:      General: Bowel sounds are normal.      Palpations: Abdomen is soft.   Musculoskeletal:      Cervical back: Neck supple. No edema.   Skin:     General: Skin is warm and dry.      Nails: There is no clubbing.   Neurological:      Mental Status: She is alert and oriented to person, place, and time.   Psychiatric:         Behavior: Behavior normal.         Thought Content: Thought content normal.             Assessment  1. Essential hypertension  stable    2. BMI 34.0-34.9,adult  unchanged        Plan and Discussion  Continue low Na diet and exercise regimen.    Follow Up  6 months      Jose Francisco Perez MD, F.A.C.C, F.S.C.A.I.

## 2022-07-22 ENCOUNTER — HOSPITAL ENCOUNTER (OUTPATIENT)
Dept: RADIOLOGY | Facility: HOSPITAL | Age: 82
Discharge: HOME | End: 2022-07-22
Attending: INTERNAL MEDICINE
Payer: MEDICARE

## 2022-07-22 ENCOUNTER — TELEPHONE (OUTPATIENT)
Dept: ORTHOPEDICS | Facility: CLINIC | Age: 82
End: 2022-07-22
Payer: MEDICARE
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
## 2022-07-22 DIAGNOSIS — M53.3 TAIL BONE PAIN: ICD-10-CM
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
## 2022-07-22 PROCEDURE — 72220 X-RAY EXAM SACRUM TAILBONE: CPT | Mod: 26,,, | Performed by: RADIOLOGY

## 2022-07-22 PROCEDURE — 72220 XR SACRUM AND COCCYX: ICD-10-PCS | Mod: 26,,, | Performed by: RADIOLOGY

## 2022-07-22 PROCEDURE — 72220 X-RAY EXAM SACRUM TAILBONE: CPT | Mod: TC,PO

## 2022-07-22 NOTE — TELEPHONE ENCOUNTER
I spoke with pt,notified her that I will place her on our waiting list and I will give her a call if anything comes sooner.        ----- Message from Cristina Styles sent at 7/22/2022  9:33 AM CDT -----  Contact: tanner-  Pt requesting call back RE: schedule appt for today, nothing available until 7/26 and pt states she is in so much pain she can not wait that long          Confirmed contact below:  Contact Name:Annette Lynch  Phone Number: [phone]

## 2022-07-26 ENCOUNTER — HOSPITAL ENCOUNTER (OUTPATIENT)
Dept: RADIOLOGY | Facility: HOSPITAL | Age: 82
Discharge: HOME | End: 2022-07-26
Attending: ORTHOPAEDIC SURGERY
Payer: MEDICARE

## 2022-07-26 ENCOUNTER — OFFICE VISIT (OUTPATIENT)
Dept: ORTHOPEDICS | Facility: CLINIC | Age: 82
End: 2022-07-26
Payer: MEDICARE

## 2022-07-26 DIAGNOSIS — R52 PAIN: ICD-10-CM

## 2022-07-26 DIAGNOSIS — M76.821 POSTERIOR TIBIAL TENDON DYSFUNCTION (PTTD) OF RIGHT LOWER EXTREMITY: Primary | ICD-10-CM

## 2022-07-26 DIAGNOSIS — M77.41 METATARSALGIA, RIGHT FOOT: ICD-10-CM

## 2022-07-26 PROCEDURE — 99999 PR PBB SHADOW E&M-EST. PATIENT-LVL II: ICD-10-PCS | Mod: PBBFAC,,, | Performed by: ORTHOPAEDIC SURGERY

## 2022-07-26 PROCEDURE — 73630 X-RAY EXAM OF FOOT: CPT | Mod: 26,RT,, | Performed by: RADIOLOGY

## 2022-07-26 PROCEDURE — 99203 PR OFFICE/OUTPT VISIT, NEW, LEVL III, 30-44 MIN: ICD-10-PCS | Mod: S$PBB,,, | Performed by: ORTHOPAEDIC SURGERY

## 2022-07-26 PROCEDURE — 73630 XR FOOT COMPLETE 3 VIEW RIGHT: ICD-10-PCS | Mod: 26,RT,, | Performed by: RADIOLOGY

## 2022-07-26 PROCEDURE — 99203 OFFICE O/P NEW LOW 30 MIN: CPT | Mod: S$PBB,,, | Performed by: ORTHOPAEDIC SURGERY

## 2022-07-26 PROCEDURE — 99212 OFFICE O/P EST SF 10 MIN: CPT | Mod: PBBFAC | Performed by: ORTHOPAEDIC SURGERY

## 2022-07-26 PROCEDURE — 73630 X-RAY EXAM OF FOOT: CPT | Mod: TC,RT

## 2022-07-26 PROCEDURE — 99999 PR PBB SHADOW E&M-EST. PATIENT-LVL II: CPT | Mod: PBBFAC,,, | Performed by: ORTHOPAEDIC SURGERY

## 2022-07-26 NOTE — PROGRESS NOTES
[facility]    SUBJECTIVE:     Chief Complaint: Right ankle pain with flattening of arch     History of Present Illness:  Patient is a 81 y.o. female with history of bilateral 5th metatarsal fractures, and right midfoot fracture who presents with acute right ankle pain and arch collapse of the right foot.  Says her pain occurred suddenly this past Friday (7/22) while watching TV.  Describes pain as sharp, around the postero-medial ankle, and occurring in 10 minute episodes throughout the day.  Has never had a similar episode, and has not had pain since then.  She is also concerned about her right foot developing a flattened arch with the forefoot turning outwards.  Since being told she had a right midfoot fracture around 5 years ago, she has worn orthotic inserts which have helped alleviate some of her pain.          Review of patient's allergies indicates:   Allergen Reactions    Hydrocortisone Swelling    Aspirin      Pt is okay to take 81mg daily    Cortisone        Past Medical History:   Diagnosis Date    Allergy     Essential hypertension 8/31/2020    Migraine     TB (pulmonary tuberculosis)      Past Surgical History:   Procedure Laterality Date    THORACENTESIS       Family History   Family history unknown: Yes     Social History     Tobacco Use    Smoking status: Never Smoker    Smokeless tobacco: Never Used   Substance Use Topics    Alcohol use: Yes     Comment: socially    Drug use: Never        Review of Systems:  Constitution: Negative for chills, fever  HENT: Negative for congestion  Cardiovascular: Negative for chest pain, palpitations  Respiratory: Negative for cough, shortness of breath  Hematologic/Lymphatic: Negative for easy bruising/bleeding  Skin: Negative for rash, suspicious lesions  Gastrointestinal: Negative for nausea, vomiting, bowel incontinence  Genitourinary: Negative for bladder incontinence  Neurological: Negative for numbness, paresthesias,  sensory change  Musculoskeletal: see HPI      OBJECTIVE:     Vital Signs:  There were no vitals taken for this visit.    Physical Exam:  General:  NAD, WDWN, Mood is pleasant  HENT:  NCAT, Bilateral ears/eyes normal  CV:  Normal pulses, color, and cap refill  Lungs:  Normal respiratory effort  Psych:  Alert and oriented x 3    MSK: right Foot and Ankle Exam:  Inspection: Mild swelling present at posterior aspect of medial malleolus  Standing examination demonstrates Valgus hindfoot/forefoot alignment. Medial longitudinal arch is pes planus and slightly pronated.   Positive [id number] sign right > left.  No lesser toe deformities present.   Palpation: TTP about the PTT at medial malleolus and accessory navicular. Hindfoot is flexible.    ROM: Ankle ROM is normal; df15 deg, pf35 deg.   Neurovascular: Able but painful to perform double limb heel rise and further testing deferred. Unable to perform single limb heel rise.   4/5 PTT with pain and with no crepitus. Otherwise fires TA/GSC/peroneals.  SILT SP/DP/PT and able to localize.. Palpable DP and PT pulses.        XRAYS:  Standing 3v right foot demonstrate   - Accessory Navicular present   - No evidence of any fracture or dislocation.    - The osseous structures appear well mineralized and well aligned.     All other pertinent labs and imaging were reviewed.      ASSESSMENT:     Annette Lynch is a 81 y.o. old female, with history of bilateral 5th metatarsal fractures, and right midfoot fracture who presents with acute right ankle pain and arch collapse of the right foot secondary to posterior tibial tendon insufficiency.     1. Posterior tibial tendon dysfunction (PTTD) of right lower extremity  HME - OTHER   2. Metatarsalgia, right foot  X-Ray Foot Complete Right    HME - OTHER         PLAN:     - Seen and examined with Dr. Zuniga   Discussed both surgical and nonsurgical treatment options   Will try new custom orthotic inserts before considering  surgery   Follow-up in 2 months for reassessment of pain       Signed:  APRIL Fuller M.D.   Orthopaedic Surgery, PGY1    I have personally taken the history and examined this patient and agree with the residents note as stated above.

## 2022-10-07 ENCOUNTER — IMMUNIZATION (OUTPATIENT)
Dept: PHARMACY | Facility: CLINIC | Age: 82
End: 2022-10-07
Payer: MEDICARE

## 2022-10-07 DIAGNOSIS — Z23 NEED FOR VACCINATION: Primary | ICD-10-CM

## 2022-10-26 ENCOUNTER — LAB VISIT (OUTPATIENT)
Dept: LAB | Facility: HOSPITAL | Age: 82
End: 2022-10-26
Payer: MEDICARE

## 2022-10-26 DIAGNOSIS — M81.0 AGE-RELATED OSTEOPOROSIS WITHOUT CURRENT PATHOLOGICAL FRACTURE: ICD-10-CM

## 2022-10-26 DIAGNOSIS — R73.9 ELEVATED BLOOD SUGAR: ICD-10-CM

## 2022-10-26 DIAGNOSIS — K44.9 HIATAL HERNIA: ICD-10-CM

## 2022-10-26 DIAGNOSIS — F32.A DEPRESSION, UNSPECIFIED DEPRESSION TYPE: ICD-10-CM

## 2022-10-26 DIAGNOSIS — I10 ESSENTIAL HYPERTENSION: ICD-10-CM

## 2022-10-26 DIAGNOSIS — G43.109 MIGRAINE WITH AURA AND WITHOUT STATUS MIGRAINOSUS, NOT INTRACTABLE: ICD-10-CM

## 2022-10-26 LAB
ALBUMIN SERPL BCP-MCNC: 3.9 G/DL (ref 3.5–5.2)
ALP SERPL-CCNC: 68 U/L (ref 55–135)
ALT SERPL W/O P-5'-P-CCNC: 18 U/L (ref 10–44)
ANION GAP SERPL CALC-SCNC: 7 MMOL/L (ref 8–16)
AST SERPL-CCNC: 20 U/L (ref 10–40)
BILIRUB SERPL-MCNC: 0.6 MG/DL (ref 0.1–1)
BUN SERPL-MCNC: 20 MG/DL (ref 8–23)
CALCIUM SERPL-MCNC: 9.7 MG/DL (ref 8.7–10.5)
CHLORIDE SERPL-SCNC: 105 MMOL/L (ref 95–110)
CO2 SERPL-SCNC: 28 MMOL/L (ref 23–29)
CREAT SERPL-MCNC: 0.7 MG/DL (ref 0.5–1.4)
EST. GFR  (NO RACE VARIABLE): >60 ML/MIN/1.73 M^2
ESTIMATED AVG GLUCOSE: 117 MG/DL (ref 68–131)
GLUCOSE SERPL-MCNC: 87 MG/DL (ref 70–110)
HBA1C MFR BLD: 5.7 % (ref 4–5.6)
POTASSIUM SERPL-SCNC: 4.8 MMOL/L (ref 3.5–5.1)
PROT SERPL-MCNC: 7.2 G/DL (ref 6–8.4)
SODIUM SERPL-SCNC: 140 MMOL/L (ref 136–145)
TSH SERPL DL<=0.005 MIU/L-ACNC: 0.8 UIU/ML (ref 0.4–4)

## 2022-10-26 PROCEDURE — 83036 HEMOGLOBIN GLYCOSYLATED A1C: CPT | Performed by: INTERNAL MEDICINE

## 2022-10-26 PROCEDURE — 80053 COMPREHEN METABOLIC PANEL: CPT | Performed by: INTERNAL MEDICINE

## 2022-10-26 PROCEDURE — 36415 COLL VENOUS BLD VENIPUNCTURE: CPT | Mod: PO | Performed by: INTERNAL MEDICINE

## 2022-10-26 PROCEDURE — 84443 ASSAY THYROID STIM HORMONE: CPT | Performed by: INTERNAL MEDICINE

## 2022-12-28 ENCOUNTER — TELEPHONE (OUTPATIENT)
Dept: CARDIOLOGY | Facility: CLINIC | Age: 82
End: 2022-12-28
Payer: MEDICARE

## 2022-12-28 ENCOUNTER — PATIENT MESSAGE (OUTPATIENT)
Dept: CARDIOLOGY | Facility: CLINIC | Age: 82
End: 2022-12-28
Payer: MEDICARE

## 2023-01-27 ENCOUNTER — OFFICE VISIT (OUTPATIENT)
Dept: CARDIOLOGY | Facility: CLINIC | Age: 83
End: 2023-01-27
Payer: MEDICARE

## 2023-01-27 VITALS
HEART RATE: 66 BPM | HEIGHT: 62 IN | DIASTOLIC BLOOD PRESSURE: 86 MMHG | BODY MASS INDEX: 34.6 KG/M2 | OXYGEN SATURATION: 98 % | WEIGHT: 188 LBS | SYSTOLIC BLOOD PRESSURE: 132 MMHG

## 2023-01-27 DIAGNOSIS — I10 ESSENTIAL HYPERTENSION: Primary | ICD-10-CM

## 2023-01-27 PROCEDURE — 99999 PR PBB SHADOW E&M-EST. PATIENT-LVL III: CPT | Mod: PBBFAC,,, | Performed by: INTERNAL MEDICINE

## 2023-01-27 PROCEDURE — 93010 ELECTROCARDIOGRAM REPORT: CPT | Mod: ,,, | Performed by: INTERNAL MEDICINE

## 2023-01-27 PROCEDURE — 99213 OFFICE O/P EST LOW 20 MIN: CPT | Mod: PBBFAC | Performed by: INTERNAL MEDICINE

## 2023-01-27 PROCEDURE — 93005 ELECTROCARDIOGRAM TRACING: CPT

## 2023-01-27 PROCEDURE — 93010 EKG 12-LEAD: ICD-10-PCS | Mod: ,,, | Performed by: INTERNAL MEDICINE

## 2023-01-27 PROCEDURE — 99213 PR OFFICE/OUTPT VISIT, EST, LEVL III, 20-29 MIN: ICD-10-PCS | Mod: S$PBB,,, | Performed by: INTERNAL MEDICINE

## 2023-01-27 PROCEDURE — 99999 PR PBB SHADOW E&M-EST. PATIENT-LVL III: ICD-10-PCS | Mod: PBBFAC,,, | Performed by: INTERNAL MEDICINE

## 2023-01-27 PROCEDURE — 99213 OFFICE O/P EST LOW 20 MIN: CPT | Mod: S$PBB,,, | Performed by: INTERNAL MEDICINE

## 2023-01-27 RX ORDER — MELOXICAM 15 MG/1
15 TABLET ORAL DAILY PRN
COMMUNITY
Start: 2022-10-31 | End: 2023-04-21 | Stop reason: CLARIF

## 2023-01-27 NOTE — PROGRESS NOTES
Cardiology    1/27/2023  11:43 AM    Problem list  Patient Active Problem List   Diagnosis    Hiatal hernia    Depression    Elevated blood sugar    Age-related osteoporosis without current pathological fracture    Chest pain    Essential hypertension    BMI 34.0-34.9,adult    Migraine with aura and without status migrainosus, not intractable       CC:  Follow-up    HPI:  She is been doing well.  Her blood pressure is well controlled.  She has no complaints of chest pain or shortness of breath.    Medications  Current Outpatient Medications   Medication Sig Dispense Refill    ascorbic acid, vitamin C, (VITAMIN C) 1000 MG tablet Take 1,000 mg by mouth once daily.      aspirin (ECOTRIN) 81 MG EC tablet Take 81 mg by mouth once daily.      co-enzyme Q-10 30 mg capsule Take 30 mg by mouth 3 (three) times daily.      DYMISTA 137-50 mcg/spray [place] nassal spray SPRAY ONCE IEN BID 1 Bottle 1    famotidine (PEPCID) 10 MG tablet Take 10 mg by mouth 2 (two) times daily.      magnesium 200 mg Tab Take 400 mg by mouth once.      meloxicam (MOBIC) 15 MG tablet Take 15 mg by mouth daily as needed.      multivitamin (THERAGRAN) per tablet Take 1 tablet by mouth once daily.      omega-3 fatty acids 1,000 mg Cap Take by mouth.      pantoprazole (PROTONIX) 40 MG tablet TAKE 1 TABLET(40 MG) BY MOUTH EVERY DAY 90 tablet 1    sucralfate (CARAFATE) 1 gram tablet Take 1 tablet (1 g total) by mouth 4 (four) times daily as needed. 45 tablet 2    trimethobenzamide (TIGAN) 300 mg capsule trimethobenzamide 300 mg capsule   TK 1 C PO Q 8 H PRF NAUSEA      vitamin D (VITAMIN D3) 1000 units Tab Take 1,000 Units by mouth once daily.      cetirizine (ZYRTEC) 10 MG tablet Take 10 mg by mouth once daily.      lisinopriL 10 MG tablet TAKE 1 TABLET(10 MG) BY MOUTH EVERY DAY (Patient not taking: Reported on 1/27/2023) 90 tablet 1    meclizine (ANTIVERT) 12.5 mg tablet Take 1 tablet (12.5 mg total) by mouth 3 (three) times daily as needed.  (Patient not taking: Reported on 1/27/2023) 21 tablet 0    perphenazine-amitriptyline 2-25 mg 2-25 mg Tab perphenazine-amitriptyline 2 mg-25 mg tablet      sars-cov-2, covid-19 omicron, (MODERNA COVID BIVAL,18Y UP,-PF) 50 mcg/0.5 mL injection Inject into the muscle. (Patient not taking: Reported on 1/27/2023) 0.5 mL 0     No current facility-administered medications for this visit.      Prior to Admission medications    Medication Sig Start Date End Date Taking? Authorizing Provider   ascorbic acid, vitamin C, (VITAMIN C) 1000 MG tablet Take 1,000 mg by mouth once daily.   Yes Historical Provider   aspirin (ECOTRIN) 81 MG EC tablet Take 81 mg by mouth once daily.   Yes Historical Provider   co-enzyme Q-10 30 mg capsule Take 30 mg by mouth 3 (three) times daily.   Yes Historical Provider   DYMISTA 137-50 mcg/spray [place] nassal spray SPRAY ONCE IEN BID 7/23/21  Yes Johanne Leonard MD   famotidine (PEPCID) 10 MG tablet Take 10 mg by mouth 2 (two) times daily.   Yes Historical Provider   magnesium 200 mg Tab Take 400 mg by mouth once.   Yes Historical Provider   meloxicam (MOBIC) 15 MG tablet Take 15 mg by mouth daily as needed. 10/31/22  Yes Historical Provider   multivitamin (THERAGRAN) per tablet Take 1 tablet by mouth once daily.   Yes Historical Provider   omega-3 fatty acids 1,000 mg Cap Take by mouth.   Yes Historical Provider   pantoprazole (PROTONIX) 40 MG tablet TAKE 1 TABLET(40 MG) BY MOUTH EVERY DAY 1/24/23  Yes Johanne Leonard MD   sucralfate (CARAFATE) 1 gram tablet Take 1 tablet (1 g total) by mouth 4 (four) times daily as needed. 12/28/22  Yes Johanne Leonard MD   trimethobenzamide (TIGAN) 300 mg capsule trimethobenzamide 300 mg capsule   TK 1 C PO Q 8 H PRF NAUSEA   Yes Historical Provider   vitamin D (VITAMIN D3) 1000 units Tab Take 1,000 Units by mouth once daily.   Yes Historical Provider   cetirizine (ZYRTEC) 10 MG tablet Take 10 mg by mouth once daily.    Historical Provider    lisinopriL 10 MG tablet TAKE 1 TABLET(10 MG) BY MOUTH EVERY DAY  Patient not taking: Reported on 1/27/2023 1/16/23   Juanito Monique MD   meclizine (ANTIVERT) 12.5 mg tablet Take 1 tablet (12.5 mg total) by mouth 3 (three) times daily as needed.  Patient not taking: Reported on 1/27/2023 8/10/22   Johanne Leonard MD   perphenazine-amitriptyline 2-25 mg 2-25 mg Tab perphenazine-amitriptyline 2 mg-25 mg tablet    Historical Provider   sars-cov-2, covid-19 omicron, (MODERNA COVID BIVAL,18Y UP,-PF) 50 mcg/0.5 mL injection Inject into the muscle.  Patient not taking: Reported on 1/27/2023 10/7/22   Apple Huitron MD         History  Past Medical History:   Diagnosis Date    Allergy     Essential hypertension 8/31/2020    Migraine     TB (pulmonary tuberculosis)      Past Surgical History:   Procedure Laterality Date    THORACENTESIS       Social History     Socioeconomic History    Marital status:    Tobacco Use    Smoking status: Never    Smokeless tobacco: Never   Substance and Sexual Activity    Alcohol use: Yes     Comment: socially    Drug use: Never         Allergies  Review of patient's allergies indicates:   Allergen Reactions    Hydrocortisone Swelling    Aspirin      Pt is okay to take 81mg daily    Cortisone          Review of Systems   Review of Systems   Constitutional: Negative for decreased appetite, fever and weight loss.   HENT:  Negative for congestion and nosebleeds.    Eyes:  Negative for double vision, vision loss in left eye, vision loss in right eye and visual disturbance.   Cardiovascular:  Negative for chest pain, claudication, cyanosis, dyspnea on exertion, irregular heartbeat, leg swelling, near-syncope, orthopnea, palpitations, paroxysmal nocturnal dyspnea and syncope.   Respiratory:  Negative for cough, hemoptysis, shortness of breath, sleep disturbances due to breathing, snoring, sputum production and wheezing.    Endocrine: Negative for cold intolerance and heat  intolerance.   Skin:  Negative for nail changes and rash.   Musculoskeletal:  Negative for joint pain, muscle cramps, muscle weakness and myalgias.   Gastrointestinal:  Negative for change in bowel habit, heartburn, hematemesis, hematochezia, hemorrhoids and melena.   Neurological:  Negative for dizziness, focal weakness and headaches.       Physical Exam  Wt Readings from Last 1 Encounters:   01/27/23 85.3 kg (188 lb)     BP Readings from Last 3 Encounters:   01/27/23 132/86   10/27/22 (!) 140/80   07/01/22 120/78     Pulse Readings from Last 1 Encounters:   01/27/23 66     Body mass index is 34.39 kg/m².    Physical Exam  Constitutional:       Appearance: She is well-developed.   HENT:      Head: Atraumatic.   Eyes:      General: No scleral icterus.  Neck:      Vascular: Normal carotid pulses. No carotid bruit, hepatojugular reflux or JVD.   Cardiovascular:      Rate and Rhythm: Normal rate and regular rhythm.      Chest Wall: PMI is not displaced.      Pulses: Intact distal pulses.           Carotid pulses are 2+ on the right side and 2+ on the left side.       Radial pulses are 2+ on the right side and 2+ on the left side.        Dorsalis pedis pulses are 2+ on the right side and 2+ on the left side.      Heart sounds: Normal heart sounds, S1 normal and S2 normal. No murmur heard.    No friction rub.   Pulmonary:      Effort: Pulmonary effort is normal. No respiratory distress.      Breath sounds: Normal breath sounds. No stridor. No wheezing or rales.   Chest:      Chest wall: No tenderness.   Abdominal:      General: Bowel sounds are normal.      Palpations: Abdomen is soft.   Musculoskeletal:      Cervical back: Neck supple. No edema.   Skin:     General: Skin is warm and dry.      Nails: There is no clubbing.   Neurological:      Mental Status: She is alert and oriented to person, place, and time.   Psychiatric:         Behavior: Behavior normal.         Thought Content: Thought content normal.            Assessment  1. Essential hypertension  Well controlled on meds, continue to monitor    2. BMI 34.0-34.9,adult  unchanged        Plan and Discussion  Continue current medications.  Continue to monitor blood pressure at home.    Follow Up  6 months      Jose Francisco Perez MD, F.A.C.C, F.S.C.A.I.        30 minutes were spent in chart review, documentation and review of results, and evaluation, treatment, and counseling of patient on the same day of service.    Disclaimer: This document was created using voice recognition software (i[employer] Direct). Although it may be edited, this document may contain errors related to incorrect recognition of the spoken word. Please call the physician if clarification is needed.

## 2023-02-16 ENCOUNTER — HOSPITAL ENCOUNTER (OUTPATIENT)
Dept: RADIOLOGY | Facility: OTHER | Age: 83
Discharge: HOME | End: 2023-02-16
Attending: INTERNAL MEDICINE
Payer: MEDICARE

## 2023-02-16 DIAGNOSIS — N64.4 BREAST PAIN, RIGHT: ICD-10-CM

## 2023-02-16 PROCEDURE — 77062 BREAST TOMOSYNTHESIS BI: CPT | Mod: 26,,, | Performed by: RADIOLOGY

## 2023-02-16 PROCEDURE — 77066 MAMMO DIGITAL DIAGNOSTIC BILAT WITH TOMO: ICD-10-PCS | Mod: 26,,, | Performed by: RADIOLOGY

## 2023-02-16 PROCEDURE — 77066 DX MAMMO INCL CAD BI: CPT | Mod: 26,,, | Performed by: RADIOLOGY

## 2023-02-16 PROCEDURE — 77062 MAMMO DIGITAL DIAGNOSTIC BILAT WITH TOMO: ICD-10-PCS | Mod: 26,,, | Performed by: RADIOLOGY

## 2023-02-16 PROCEDURE — 77066 DX MAMMO INCL CAD BI: CPT | Mod: TC

## 2023-03-20 PROBLEM — K21.9 GERD (GASTROESOPHAGEAL REFLUX DISEASE): Status: ACTIVE | Noted: 2023-03-20

## 2023-04-21 ENCOUNTER — ANESTHESIA EVENT (OUTPATIENT)
Dept: SURGERY | Facility: OTHER | Age: 83
End: 2023-04-21
Payer: MEDICARE

## 2023-04-21 ENCOUNTER — HOSPITAL ENCOUNTER (OUTPATIENT)
Dept: PREADMISSION TESTING | Facility: OTHER | Age: 83
Discharge: HOME | End: 2023-04-21
Attending: ORTHOPAEDIC SURGERY
Payer: MEDICARE

## 2023-04-21 VITALS
HEART RATE: 74 BPM | BODY MASS INDEX: 34.96 KG/M2 | OXYGEN SATURATION: 95 % | HEIGHT: 62 IN | SYSTOLIC BLOOD PRESSURE: 183 MMHG | DIASTOLIC BLOOD PRESSURE: 93 MMHG | WEIGHT: 190 LBS

## 2023-04-21 RX ORDER — LIDOCAINE HYDROCHLORIDE 10 MG/ML
0.5 INJECTION, SOLUTION EPIDURAL; INFILTRATION; INTRACAUDAL; PERINEURAL ONCE
Status: CANCELLED | OUTPATIENT
Start: 2023-04-21 | End: 2023-04-21

## 2023-04-21 RX ORDER — TRAMADOL HYDROCHLORIDE 50 MG/1
TABLET ORAL
COMMUNITY
Start: 2023-04-20 | End: 2023-07-21

## 2023-04-21 RX ORDER — SODIUM CHLORIDE, SODIUM LACTATE, POTASSIUM CHLORIDE, CALCIUM CHLORIDE 600; 310; 30; 20 MG/100ML; MG/100ML; MG/100ML; MG/100ML
INJECTION, SOLUTION INTRAVENOUS CONTINUOUS
Status: CANCELLED | OUTPATIENT
Start: 2023-04-21

## 2023-04-21 RX ORDER — ACETAMINOPHEN 500 MG
1000 TABLET ORAL
Status: CANCELLED | OUTPATIENT
Start: 2023-04-21 | End: 2023-04-21

## 2023-04-21 NOTE — ANESTHESIA PREPROCEDURE EVALUATION
04/21/2023  Annette Lynch is a 82 y.o., female.      Pre-op Assessment    I have reviewed the Patient Summary Reports.     I have reviewed the Nursing Notes.    I have reviewed the Medications.     Review of Systems  Anesthesia Hx:  Denies Family Hx of Anesthesia complications.   Denies Personal Hx of Anesthesia complications.   Social:  Non-Smoker    Hematology/Oncology:  Hematology Normal   Oncology Normal     EENT/Dental:EENT/Dental Normal   Cardiovascular:   Exercise tolerance: good Hypertension    Pulmonary:  Pulmonary Normal 1978 pulm TB   Renal/:  Renal/ Normal     Hepatic/GI:   PUD, Hiatal Hernia, GERD Frequent GI sx's    Musculoskeletal:  Musculoskeletal Normal    Neurological:   Neuromuscular Disease, (hx vertigo, not currently) Headaches (migraines)    Endocrine:  Obesity / BMI > 30  Dermatological:  Skin Normal    Psych:  Psychiatric Normal           Physical Exam  General: Cooperative, Alert and Oriented    Airway:  Mallampati: II   Mouth Opening: Small, but > 3cm  TM Distance: Normal  Neck ROM: Normal ROM    Dental:  Intact, Caps / Implants        Anesthesia Plan  Type of Anesthesia, risks & benefits discussed:    Anesthesia Type: Gen ETT  Intra-op Monitoring Plan: Standard ASA Monitors  Post Op Pain Control Plan: multimodal analgesia, IV/PO Opioids PRN and peripheral nerve block  Induction:  IV  Airway Plan: Video  Informed Consent: Informed consent signed with the Patient and all parties understand the risks and agree with anesthesia plan.  All questions answered.   ASA Score: 2  Anesthesia Plan Notes: PCP note 3/20/23 in Epic, as above    Ready For Surgery From Anesthesia Perspective.     .

## 2023-04-21 NOTE — DISCHARGE INSTRUCTIONS
Information to Prepare you for your Surgery    PRE-ADMIT TESTING -  [phone]    [address] [facility]          Your surgery has been scheduled at [facility]. We are pleased to have the opportunity to serve you. For Further Information please call [phone].    On the day of surgery please report to the Information Desk on the 1st floor.    CONTACT YOUR PHYSICIAN'S OFFICE THE DAY PRIOR TO YOUR SURGERY TO OBTAIN YOUR ARRIVAL TIME.     The evening before surgery do not eat anything after 9 p.m. ( this includes hard candy, chewing gum and mints).  You may only have GATORADE, POWERADE AND WATER  from 9 p.m. until you leave your home.   DO NOT DRINK ANY LIQUIDS ON THE WAY TO THE HOSPITAL.      Why does your anesthesiologist allow you to drink Gatorade/Powerade before surgery?  Gatorade/Powerade helps to increase your comfort before surgery and to decrease your nausea after surgery. The carbohydrates in Gatorade/Powerade help reduce your body's stress response to surgery.  If you are a diabetic-drink only water prior to surgery.       Patients may have 2 visitors pre and post procedure. Only 2 visitors will be allowed in the Surgical building with the patient. No one under the age of 12 will be allowed into the facility.    SPECIAL MEDICATION INSTRUCTIONS: TAKE medications checked off by the Anesthesiologist on your Medication List.    Angiogram Patients: Take medications as instructed by your physician, including aspirin.     Surgery Patients:    If you take ASPIRIN - Your PHYSICIAN/SURGEON will need to inform you IF/OR when you need to stop taking aspirin prior to your surgery.     The week prior to surgery do not ot take any medications containing IBUPROFEN or NSAIDS ( Advil, Motrin, Goodys, BC, Aleve, Naproxen etc) If you are not sure if you should take a medicine please call your surgeon's office.  Ok to take Tylenol    Do Not Wear any make-up  (especially eye make-up) to surgery. Please remove any false eyelashes or eyelash extensions. If you arrive the day of surgery with makeup/eyelashes on you will be required to remove prior to surgery. (There is a risk of corneal abrasions if eye makeup/eyelash extensions are not removed)      Leave all valuables at home.   Do Not wear any jewelry or watches, including any metal in body piercings. Jewelry must be removed prior to coming to the hospital.  There is a possibility that rings that are unable to be removed may be cut off if they are on the surgical extremity.    Please remove all hair extensions, wigs, clips and any other metal accessories/ ornaments from your hair.  These items may pose a flammable/fire risk in Surgery and must be removed.    Do not shave your surgical area at least 5 days prior to your surgery. The surgical prep will be performed at the hospital according to Infection Control regulations.    Contact Lens must be removed before surgery. Either do not wear the contact lens or bring a case and solution for storage.  Please bring a container for eyeglasses or dentures as required.  Bring any paperwork your physician has provided, such as consent forms,  history and physicals, doctor's orders, etc.   Bring comfortable clothes that are loose fitting to wear upon discharge. Take into consideration the type of surgery being performed.  Maintain your diet as advised per your physician the day prior to surgery.      Adequate rest the night before surgery is advised.   Park in the Parking lot behind the hospital or in the [facility] across the street from the parking lot. Parking is complimentary.  If you will be discharged the same day as your procedure, please arrange for a responsible adult to drive you home or to accompany you if traveling by taxi.   YOU WILL NOT BE PERMITTED TO DRIVE OR TO LEAVE THE HOSPITAL ALONE AFTER SURGERY.   If you are being discharged the same day, it is  strongly recommended that you arrange for someone to remain with you for the first 24 hrs following your surgery.    The Surgeon will speak to your family/visitor after your surgery regarding the outcome of your surgery and post op care.  The Surgeon may speak to you after your surgery, but there is a possibility you may not remember the details.  Please check with your family members regarding the conversation with the Surgeon.    We strongly recommend whoever is bringing you home be present for discharge instructions.  This will ensure a thorough understanding for your post op home care.    ALL CHILDREN MUST ALWAYS BE ACCOMPANIED BY AN ADULT.    Visitors-Refer to current Visitor policy handouts.    Thank you for your cooperation.  The Staff of [facility].            Bathing Instructions with Hibiclens    Shower the evening before and morning of your procedure with Chlorhexidine (Hibiclens)  do not use Chlorhexidine on your face or genitals. Do not get in your eyes.  Wash your face with water and your regular face wash/soap  Use your regular shampoo  Apply Chlorhexidine (Hibiclens) directly on your skin or on a wet washcloth and wash gently. When showering: Move away from the shower stream when applying Chlorhexidine (Hibiclens) to avoid rinsing off too soon.  Rinse thoroughly with warm water  Do not dilute Chlorhexidine (Hibiclens)   Dry off as usual, do not use any deodorant, powder, body lotions, perfume, after shave or cologne.

## 2023-04-27 ENCOUNTER — HOSPITAL ENCOUNTER (OUTPATIENT)
Facility: OTHER | Age: 83
Discharge: HOME | End: 2023-04-27
Attending: ORTHOPAEDIC SURGERY | Admitting: ORTHOPAEDIC SURGERY
Payer: MEDICARE

## 2023-04-27 ENCOUNTER — ANESTHESIA (OUTPATIENT)
Dept: SURGERY | Facility: OTHER | Age: 83
End: 2023-04-27
Payer: MEDICARE

## 2023-04-27 DIAGNOSIS — M75.122 NONTRAUMATIC COMPLETE TEAR OF LEFT ROTATOR CUFF: ICD-10-CM

## 2023-04-27 DIAGNOSIS — M75.52 BURSITIS OF LEFT SHOULDER: Primary | ICD-10-CM

## 2023-04-27 PROBLEM — M75.42 IMPINGEMENT SYNDROME OF LEFT SHOULDER: Status: ACTIVE | Noted: 2023-04-27

## 2023-04-27 PROBLEM — S43.432A DEGENERATIVE SUPERIOR LABRAL ANTERIOR-TO-POSTERIOR (SLAP) TEAR OF LEFT SHOULDER: Status: ACTIVE | Noted: 2023-04-27

## 2023-04-27 PROCEDURE — 63600175 PHARM REV CODE 636 W HCPCS: Performed by: ANESTHESIOLOGY

## 2023-04-27 PROCEDURE — 64415 NJX AA&/STRD BRCH PLXS IMG: CPT | Performed by: ANESTHESIOLOGY

## 2023-04-27 PROCEDURE — 71000039 HC RECOVERY, EACH ADD'L HOUR: Performed by: ORTHOPAEDIC SURGERY

## 2023-04-27 PROCEDURE — 63600175 PHARM REV CODE 636 W HCPCS: Performed by: ORTHOPAEDIC SURGERY

## 2023-04-27 PROCEDURE — 25000003 PHARM REV CODE 250: Performed by: ANESTHESIOLOGY

## 2023-04-27 PROCEDURE — 63600175 PHARM REV CODE 636 W HCPCS: Performed by: NURSE ANESTHETIST, CERTIFIED REGISTERED

## 2023-04-27 PROCEDURE — 27201423 OPTIME MED/SURG SUP & DEVICES STERILE SUPPLY: Performed by: ORTHOPAEDIC SURGERY

## 2023-04-27 PROCEDURE — D9220A PRA ANESTHESIA: Mod: ANES,,, | Performed by: ANESTHESIOLOGY

## 2023-04-27 PROCEDURE — C1713 ANCHOR/SCREW BN/BN,TIS/BN: HCPCS | Performed by: ORTHOPAEDIC SURGERY

## 2023-04-27 PROCEDURE — 71000015 HC POSTOP RECOV 1ST HR: Performed by: ORTHOPAEDIC SURGERY

## 2023-04-27 PROCEDURE — 71000016 HC POSTOP RECOV ADDL HR: Performed by: ORTHOPAEDIC SURGERY

## 2023-04-27 PROCEDURE — 37000008 HC ANESTHESIA 1ST 15 MINUTES: Performed by: ORTHOPAEDIC SURGERY

## 2023-04-27 PROCEDURE — 36000710: Performed by: ORTHOPAEDIC SURGERY

## 2023-04-27 PROCEDURE — 36000711: Performed by: ORTHOPAEDIC SURGERY

## 2023-04-27 PROCEDURE — C1889 IMPLANT/INSERT DEVICE, NOC: HCPCS | Performed by: ORTHOPAEDIC SURGERY

## 2023-04-27 PROCEDURE — 25000003 PHARM REV CODE 250

## 2023-04-27 PROCEDURE — 25000003 PHARM REV CODE 250: Performed by: NURSE ANESTHETIST, CERTIFIED REGISTERED

## 2023-04-27 PROCEDURE — 71000033 HC RECOVERY, INTIAL HOUR: Performed by: ORTHOPAEDIC SURGERY

## 2023-04-27 PROCEDURE — D9220A PRA ANESTHESIA: Mod: CRNA,,, | Performed by: NURSE ANESTHETIST, CERTIFIED REGISTERED

## 2023-04-27 PROCEDURE — D9220A PRA ANESTHESIA: ICD-10-PCS | Mod: ANES,,, | Performed by: ANESTHESIOLOGY

## 2023-04-27 PROCEDURE — 37000009 HC ANESTHESIA EA ADD 15 MINS: Performed by: ORTHOPAEDIC SURGERY

## 2023-04-27 PROCEDURE — D9220A PRA ANESTHESIA: ICD-10-PCS | Mod: CRNA,,, | Performed by: NURSE ANESTHETIST, CERTIFIED REGISTERED

## 2023-04-27 DEVICE — ANCHOR SWIVELOCK KNTLS BLUE #2: Type: IMPLANTABLE DEVICE | Site: SHOULDER | Status: FUNCTIONAL

## 2023-04-27 DEVICE — IMPLANTABLE DEVICE: Type: IMPLANTABLE DEVICE | Site: SHOULDER | Status: FUNCTIONAL

## 2023-04-27 RX ORDER — CEFAZOLIN SODIUM 1 G/3ML
2 INJECTION, POWDER, FOR SOLUTION INTRAMUSCULAR; INTRAVENOUS
Status: COMPLETED | OUTPATIENT
Start: 2023-04-27 | End: 2023-04-27

## 2023-04-27 RX ORDER — PHENYLEPHRINE HYDROCHLORIDE 10 MG/ML
INJECTION INTRAVENOUS
Status: DISCONTINUED | OUTPATIENT
Start: 2023-04-27 | End: 2023-04-27

## 2023-04-27 RX ORDER — TRANEXAMIC ACID 100 MG/ML
1000 INJECTION, SOLUTION INTRAVENOUS
Status: ACTIVE | OUTPATIENT
Start: 2023-04-27

## 2023-04-27 RX ORDER — PROCHLORPERAZINE EDISYLATE 5 MG/ML
5 INJECTION INTRAMUSCULAR; INTRAVENOUS EVERY 30 MIN PRN
Status: DISCONTINUED | OUTPATIENT
Start: 2023-04-27 | End: 2023-04-27 | Stop reason: HOSPADM

## 2023-04-27 RX ORDER — DIPHENHYDRAMINE HYDROCHLORIDE 50 MG/ML
25 INJECTION INTRAMUSCULAR; INTRAVENOUS EVERY 6 HOURS PRN
Status: DISCONTINUED | OUTPATIENT
Start: 2023-04-27 | End: 2023-04-27 | Stop reason: HOSPADM

## 2023-04-27 RX ORDER — HYDROCODONE BITARTRATE AND ACETAMINOPHEN 10; 325 MG/1; MG/1
1 TABLET ORAL
Qty: 40 TABLET | Refills: 0 | Status: SHIPPED | OUTPATIENT
Start: 2023-04-27 | End: 2024-01-12

## 2023-04-27 RX ORDER — ACETAMINOPHEN 500 MG
500 TABLET ORAL EVERY 6 HOURS PRN
COMMUNITY

## 2023-04-27 RX ORDER — OXYCODONE HYDROCHLORIDE 5 MG/1
5 TABLET ORAL
Status: DISCONTINUED | OUTPATIENT
Start: 2023-04-27 | End: 2023-04-27 | Stop reason: HOSPADM

## 2023-04-27 RX ORDER — SUCCINYLCHOLINE CHLORIDE 20 MG/ML
INJECTION INTRAMUSCULAR; INTRAVENOUS
Status: DISCONTINUED | OUTPATIENT
Start: 2023-04-27 | End: 2023-04-27

## 2023-04-27 RX ORDER — LIDOCAINE HYDROCHLORIDE 20 MG/ML
INJECTION INTRAVENOUS
Status: DISCONTINUED | OUTPATIENT
Start: 2023-04-27 | End: 2023-04-27

## 2023-04-27 RX ORDER — HYDROMORPHONE HYDROCHLORIDE 2 MG/ML
0.4 INJECTION, SOLUTION INTRAMUSCULAR; INTRAVENOUS; SUBCUTANEOUS EVERY 5 MIN PRN
Status: DISCONTINUED | OUTPATIENT
Start: 2023-04-27 | End: 2023-04-27 | Stop reason: HOSPADM

## 2023-04-27 RX ORDER — PROPOFOL 10 MG/ML
VIAL (ML) INTRAVENOUS
Status: DISCONTINUED | OUTPATIENT
Start: 2023-04-27 | End: 2023-04-27

## 2023-04-27 RX ORDER — FENTANYL CITRATE 50 UG/ML
INJECTION, SOLUTION INTRAMUSCULAR; INTRAVENOUS
Status: DISCONTINUED | OUTPATIENT
Start: 2023-04-27 | End: 2023-04-27

## 2023-04-27 RX ORDER — MEPERIDINE HYDROCHLORIDE 25 MG/ML
12.5 INJECTION INTRAMUSCULAR; INTRAVENOUS; SUBCUTANEOUS ONCE AS NEEDED
Status: COMPLETED | OUTPATIENT
Start: 2023-04-27 | End: 2023-04-27

## 2023-04-27 RX ORDER — ROCURONIUM BROMIDE 10 MG/ML
INJECTION, SOLUTION INTRAVENOUS
Status: DISCONTINUED | OUTPATIENT
Start: 2023-04-27 | End: 2023-04-27

## 2023-04-27 RX ORDER — SODIUM CHLORIDE, SODIUM LACTATE, POTASSIUM CHLORIDE, CALCIUM CHLORIDE 600; 310; 30; 20 MG/100ML; MG/100ML; MG/100ML; MG/100ML
INJECTION, SOLUTION INTRAVENOUS CONTINUOUS
Status: DISCONTINUED | OUTPATIENT
Start: 2023-04-27 | End: 2023-04-27 | Stop reason: HOSPADM

## 2023-04-27 RX ORDER — ONDANSETRON 4 MG/1
8 TABLET, ORALLY DISINTEGRATING ORAL EVERY 8 HOURS PRN
Qty: 10 TABLET | Refills: 1 | Status: SHIPPED | OUTPATIENT
Start: 2023-04-27 | End: 2023-06-14 | Stop reason: SDUPTHER

## 2023-04-27 RX ORDER — TRANEXAMIC ACID 100 MG/ML
INJECTION, SOLUTION INTRAVENOUS
Status: DISCONTINUED | OUTPATIENT
Start: 2023-04-27 | End: 2023-04-27

## 2023-04-27 RX ORDER — SODIUM CHLORIDE 0.9 % (FLUSH) 0.9 %
3 SYRINGE (ML) INJECTION
Status: DISCONTINUED | OUTPATIENT
Start: 2023-04-27 | End: 2023-04-27 | Stop reason: HOSPADM

## 2023-04-27 RX ORDER — EPINEPHRINE 1 MG/ML
INJECTION, SOLUTION, CONCENTRATE INTRAVENOUS
Status: DISCONTINUED | OUTPATIENT
Start: 2023-04-27 | End: 2023-04-27 | Stop reason: HOSPADM

## 2023-04-27 RX ORDER — ACETAMINOPHEN 500 MG
1000 TABLET ORAL
Status: DISCONTINUED | OUTPATIENT
Start: 2023-04-27 | End: 2023-04-27 | Stop reason: HOSPADM

## 2023-04-27 RX ORDER — MINOCYCLINE HYDROCHLORIDE 100 MG/1
100 CAPSULE ORAL 2 TIMES DAILY
Qty: 28 CAPSULE | Refills: 0 | Status: SHIPPED | OUTPATIENT
Start: 2023-04-27 | End: 2024-03-01

## 2023-04-27 RX ORDER — ROPIVACAINE HYDROCHLORIDE 5 MG/ML
INJECTION, SOLUTION EPIDURAL; INFILTRATION; PERINEURAL
Status: COMPLETED | OUTPATIENT
Start: 2023-04-27 | End: 2023-04-27

## 2023-04-27 RX ORDER — ONDANSETRON 2 MG/ML
INJECTION INTRAMUSCULAR; INTRAVENOUS
Status: DISCONTINUED | OUTPATIENT
Start: 2023-04-27 | End: 2023-04-27

## 2023-04-27 RX ORDER — LIDOCAINE HYDROCHLORIDE 10 MG/ML
0.5 INJECTION, SOLUTION EPIDURAL; INFILTRATION; INTRACAUDAL; PERINEURAL ONCE
Status: DISCONTINUED | OUTPATIENT
Start: 2023-04-27 | End: 2023-04-27 | Stop reason: HOSPADM

## 2023-04-27 RX ADMIN — LIDOCAINE HYDROCHLORIDE 50 MG: 20 INJECTION, SOLUTION INTRAVENOUS at 07:04

## 2023-04-27 RX ADMIN — HYDROMORPHONE HYDROCHLORIDE 0.4 MG: 2 INJECTION INTRAMUSCULAR; INTRAVENOUS; SUBCUTANEOUS at 08:04

## 2023-04-27 RX ADMIN — PHENYLEPHRINE HYDROCHLORIDE 100 MCG: 10 INJECTION INTRAVENOUS at 07:04

## 2023-04-27 RX ADMIN — SUCCINYLCHOLINE CHLORIDE 140 MG: 20 INJECTION, SOLUTION INTRAMUSCULAR; INTRAVENOUS at 07:04

## 2023-04-27 RX ADMIN — ROCURONIUM BROMIDE 10 MG: 10 SOLUTION INTRAVENOUS at 07:04

## 2023-04-27 RX ADMIN — CEFAZOLIN 2 G: 330 INJECTION, POWDER, FOR SOLUTION INTRAMUSCULAR; INTRAVENOUS at 07:04

## 2023-04-27 RX ADMIN — SODIUM CHLORIDE, SODIUM LACTATE, POTASSIUM CHLORIDE, AND CALCIUM CHLORIDE: .6; .31; .03; .02 INJECTION, SOLUTION INTRAVENOUS at 08:04

## 2023-04-27 RX ADMIN — ONDANSETRON HYDROCHLORIDE 4 MG: 2 INJECTION INTRAMUSCULAR; INTRAVENOUS at 07:04

## 2023-04-27 RX ADMIN — SUGAMMADEX 200 MG: 100 INJECTION, SOLUTION INTRAVENOUS at 08:04

## 2023-04-27 RX ADMIN — MEPERIDINE HYDROCHLORIDE 12.5 MG: 25 INJECTION INTRAMUSCULAR; INTRAVENOUS; SUBCUTANEOUS at 09:04

## 2023-04-27 RX ADMIN — FENTANYL CITRATE 100 MCG: 50 INJECTION, SOLUTION INTRAMUSCULAR; INTRAVENOUS at 06:04

## 2023-04-27 RX ADMIN — PHENYLEPHRINE HYDROCHLORIDE 100 MCG: 10 INJECTION INTRAVENOUS at 08:04

## 2023-04-27 RX ADMIN — ONDANSETRON HYDROCHLORIDE 4 MG: 2 INJECTION INTRAMUSCULAR; INTRAVENOUS at 06:04

## 2023-04-27 RX ADMIN — TRANEXAMIC ACID 1000 MG: 100 INJECTION, SOLUTION INTRAVENOUS at 06:04

## 2023-04-27 RX ADMIN — SODIUM CHLORIDE, SODIUM LACTATE, POTASSIUM CHLORIDE, AND CALCIUM CHLORIDE: .6; .31; .03; .02 INJECTION, SOLUTION INTRAVENOUS at 06:04

## 2023-04-27 RX ADMIN — ROPIVACAINE HYDROCHLORIDE 30 ML: 5 INJECTION, SOLUTION EPIDURAL; INFILTRATION; PERINEURAL at 07:04

## 2023-04-27 RX ADMIN — PROPOFOL 100 MG: 10 INJECTION, EMULSION INTRAVENOUS at 07:04

## 2023-04-27 NOTE — OP NOTE
[facility]  Surgery Department  Operative Note    SUMMARY     Date of Procedure: 4/27/2023     Procedure:   Left shoulder arthroscopic rotator cuff repair  Left shoulder arthroscopic biceps tenotomy  Left shoulder arthroscopic subacromial decompression (acromioplasty, CA ligament recession, bursectomy, lateral acromial bevelling)    Surgeon(s) and Role:     * Gus Groves MD - Primary    Assistant: Avila LAW     Pre-Operative Diagnosis:   Left shoulder rotator cuff tear  Left shoulder impingement syndrome  Left shoulder SLAP tear  Left shoulder long head biceps partial-thickness tear    Post-Operative Diagnosis:   same    Anesthesia: General + regional    Technical Procedures Used: Ms. Lynch was brought to the operating room 04/27/2023 for planned left shoulder arthroscopy.  She was given 2 g of Ancef a regional nerve block and 1 g of TXA intravenously preoperatively.  She was brought to the operating room and placed in the supine position.  General endotracheal anesthesia was administered.  Examination under anesthesia revealed full passive motion with no pathologic laxity.  She was then carefully placed in the lateral decubitus position with all bony prominences padded appropriately with an axillary roll.  A shoulder suspension device was used.  The left shoulder was then prepped and draped in the usual sterile fashion and a time-out was performed.  The bony landmarks were then marked with a surgical marking pen and 50 cc of normal saline were injected into the glenohumeral joint aid in capsular distention.  A standard posterior portal was then established.      Diagnostic arthroscopy then proceeded.  There was inflammatory changes circumferentially around the labrum.  There were no significant chondral lesions of the humeral head or glenoid.  There was a degenerative type 2 slap tear with low-grade partial-thickness fraying of the long head biceps with inflammatory changes.  There  was an obvious full-thickness supraspinatus tear at the insertion.  The subscapularis was unremarkable.  There was synovitis within the rotator interval.    With spinal needle localization an anterior portal was established through the rotator interval.  The long head biceps was then tenotomized at the origin on the superior labrum and was seen to incarcerated self within the groove so no formal tenodesis was performed.  The ablator device and a shaver were used to trim back the degenerative labral fraying.  An 18 gauge spinal needle was then placed percutaneously through the rotator cuff defect and a 0 PDS suture was shuttled for later inspection from the bursal side.  The scope was then placed in the subacromial space.      The tear was confirmed.  It was a crescent type tear with significant maceration at the leading edges.  The cuff tissue was quite thin at the leading edge.  It was a simple crescent type tear with an intact anterior and posterior cable involving the entirety of the supraspinatus and a small portion of the anterior infraspinatus.  The tissue quality was decent.  A lateral portal was established with spinal needle localization and a thorough bursectomy was performed of some chronic inflammatory appearing bursal tissue.  The macerated edges of the cuff were debrided and all soft tissue off the insertion site at the articular margin was removed.  A bleeding bony bed was then established for planned repair.  An accessory lateral portal was established with spinal needle localization and an Arthrex 2.6 mm triple loaded FiberTak anchor was placed at the articular margin.  All 6 sutures were then passed in a horizontal mattress fashion getting good bites of tissue medial to the area of thinning at the lateral edge of the torn tendon.  After all 6 sutures were passed and then placed 2 FiberLink sutures, 1 at the posterior most portion of the tear and another in the midportion to capture all the tissue.   Sliding knots were then tied getting a nice tension-free repair.  A double row construct was then utilized tape taking 1 suture from each of the knots and then the anterior FiberLink into the posterior/lateral SwiveLock and then the remaining 3 sutures from the medial row and the other FiberLink incorporating it into the anterior/lateral SwiveLock.    A subacromial decompression was then performed.  Any remaining bursal tissue was removed.  The CA ligament was recessed with the ablator device and the undersurface of the acromion was skeletonized.  The bur was then used to perform an acromioplasty from a type 2 to type 1 morphology.  A lateral acromial bevelling was also performed to correct the pathologic critical shoulder angle.  The shaver was then used to remove any remaining debris and removed all excess fluid from the shoulder.    The portal sites were then closed with 3-0 Vicryl suture.  A soft dressing was applied followed by a polar Care unit and an abduction brace.  The patient was then extubated in the operating room and taken to the PACU without complication.    Description of the Findings of the Procedure:  See dictation    Significant Surgical Tasks Conducted by the Assistant(s), if Applicable:  Positioning, prepping, draping, camera, instrumentation, closure, bandage, brace    Complications: No    Estimated Blood Loss (EBL): 5cc           Implants:   Implant Name Type Inv. Item Serial No.  Lot No. LRB No. Used Action   Self-Punching 2.6 FiberTak Suture [place], Triple Loaded    ARTHREX [id number] Left 1 Implanted   ANCHOR SWIVELOCK KNTLS BLUE #2 - [id number]  ANCHOR SWIVELOCK KNTLS BLUE #2  ARTHREX [id number] Left 2 Implanted       Specimens:   Specimen (24h ago, onward)      None                    Condition: Good    Disposition: PACU - hemodynamically stable.    Attestation: I was present and scrubbed for the entire procedure.    Discharge Note    SUMMARY     Admit Date: 4/27/2023    Discharge  Date and Time:  04/27/2023 8:32 AM    Hospital Course (synopsis of major diagnoses, care, treatment, and services provided during the course of the hospital stay): outpt     Final Diagnosis:  Left shoulder rotator cuff tear    Disposition: Home or Self Care    Follow Up/Patient Instructions:     Medications:  Reconciled Home Medications:      Medication List        START taking these medications      HYDROcodone-acetaminophen  mg per tablet  Commonly known as: NORCO  Take 1 tablet by mouth every 4 to 6 hours as needed for Pain.     minocycline 100 MG capsule  Commonly known as: MINOCIN,DYNACIN  Take 1 capsule (100 mg total) by mouth 2 (two) times daily.     ondansetron 4 MG Tbdl  Commonly known as: ZOFRAN-ODT  Take 2 tablets (8 mg total) by mouth every 8 (eight) hours as needed (nausea).            CONTINUE taking these medications      acetaminophen 500 MG tablet  Commonly known as: TYLENOL  Take 500 mg by mouth every 6 (six) hours as needed for Pain.     ascorbic acid (vitamin C) 1000 MG tablet  Commonly known as: VITAMIN C  Take 1,000 mg by mouth once daily.     aspirin 81 MG EC tablet  Commonly known as: ECOTRIN  Take 81 mg by mouth once daily.     cetirizine 10 MG tablet  Commonly known as: ZYRTEC  Take 10 mg by mouth once daily.     co-enzyme Q-10 30 mg capsule  Take 30 mg by mouth 3 (three) times daily.     DYMISTA 137-50 mcg/spray Spry nassal spray  Generic drug: azelastine-fluticasone  SPRAY ONCE IEN BID     famotidine 10 MG tablet  Commonly known as: PEPCID  Take 10 mg by mouth 2 (two) times daily.     magnesium 200 mg Tab  Take 400 mg by mouth once.     meclizine 12.5 mg tablet  Commonly known as: ANTIVERT  Take 1 tablet (12.5 mg total) by mouth 3 (three) times daily as needed.     MODERNA COVID BIVAL(6Y UP)(PF) 50 mcg/0.5 mL injection  Generic drug: sars-cov-2 (covid-19 omicron)  Inject into the muscle.     multivitamin per tablet  Commonly known as: THERAGRAN  Take 1 tablet by mouth once daily.      omega-3 fatty acids 1,000 mg Cap  Take by mouth.     ondansetron 8 MG tablet  Commonly known as: ZOFRAN  Take 1 tablet (8 mg total) by mouth every 8 (eight) hours as needed for Nausea.     pantoprazole 40 MG tablet  Commonly known as: PROTONIX  TAKE 1 TABLET(40 MG) BY MOUTH EVERY DAY     sucralfate 1 gram tablet  Commonly known as: CARAFATE  Take 1 tablet (1 g total) by mouth 4 (four) times daily as needed.     traMADoL 50 mg tablet  Commonly known as: ULTRAM     vitamin D 1000 units Tab  Commonly known as: VITAMIN D3  Take 1,000 Units by mouth once daily.            Discharge Procedure Orders   Diet general     Call MD for:  temperature >100.4     Call MD for:  persistent nausea and vomiting     Call MD for:  severe uncontrolled pain     Call MD for:  difficulty breathing, headache or visual disturbances     Call MD for:  redness, tenderness, or signs of infection (pain, swelling, redness, odor or green/yellow discharge around incision site)     Call MD for:  hives     Call MD for:  persistent dizziness or light-headedness     Call MD for:  extreme fatigue     Keep surgical extremity elevated     Ice to affected area     Lifting restrictions     Remove dressing in 48 hours   Order Comments: Then change daily.  Keep clean, dry and covered     Non weight bearing      Follow-up Information       Gus Bazan MD. Schedule an appointment as soon as possible for a visit.    Specialty: Orthopedic Surgery  Contact information:  [facility] [facility] [address] [phone]

## 2023-04-27 NOTE — TRANSFER OF CARE
Anesthesia Transfer of Care Note    Patient: Annette Lynch    Procedure(s) Performed: Procedure(s) (LRB):  ARTHROSCOPY, SHOULDER (Left)  REPAIR, ROTATOR CUFF (Left)  TENOTOMY, BICEPS, ARTHROSCOPIC (Left)  DECOMPRESSION, SUBACROMIAL SPACE (Left)    Patient location: PACU    Anesthesia Type: general    Transport from OR: Transported from OR on room air with adequate spontaneous ventilation    Post pain: adequate analgesia    Post assessment: no apparent anesthetic complications    Post vital signs: stable    Level of consciousness: awake and alert    Nausea/Vomiting: no nausea/vomiting    Complications: none    Transfer of care protocol was followed      Last vitals:   Visit Vitals  BP (!) 162/74 (BP Location: Right arm, Patient Position: Lying)   Pulse 77   Temp 36.7 °C (98.1 °F) (Oral)   Resp 18   SpO2 96%   Breastfeeding No

## 2023-04-27 NOTE — PLAN OF CARE
Annette Lynch has met all discharge criteria from Phase II. Vital Signs are stable, ambulating  without difficulty. Discharge instructions given, patient verbalized understanding. Discharged from facility via wheelchair in stable condition.

## 2023-04-27 NOTE — DISCHARGE INSTRUCTIONS
Shoulder Arthroscopy Post-Op Instructions                                       Dr. Gus Groves    1. Sling/Brace- You should wear the brace until the first post-op visit. You can take the sling off to shower but keep your arm at your side.  Do not lift your arm away from your body unless told otherwise.  I will give you/your family specific instructions about the length of brace wear.    2. Bandage- If you have physical therapy set up they will remove the bandage. Otherwise you can remove it in 3 days. Keep the incisions clean, dry and covered. Do not get it wet until you see me.     3. Ice- Use the polar care as much as you want. Make sure there are clothes or a towel between the cooling unit and your skin.     4. Exercise- If you have PT set up, they will instruct you on exercises to do. If you do not, it is OK to lean your arm over and make circles with your hand pointing to the floor (called Pendulum exercises). Do not lift or grasp anything away from the body until you see me. It is OK to take your arm out of the sling and extend your elbow as long as your elbow is at your side.     5. Medications- You will be given pain and nausea prescriptions to go home. Take the medications as written.  Call the office if you are running low with at least 2-3 days notice to give us time to refill it.  We also recommend taking a baby aspirin for 2 weeks after surgery to decrease the (very,very low) risk of blood clot formation.    6. Bathing- Do not get your shoulder wet until you see me in clinic.    7. Appointment- You should already have your post-op appointment scheduled. If you do not or you can't remember, call the office for more information.

## 2023-04-27 NOTE — ANESTHESIA POSTPROCEDURE EVALUATION
Anesthesia Post Evaluation    Patient: Annette Lynch    Procedure(s) Performed: Procedure(s) (LRB):  ARTHROSCOPY, SHOULDER (Left)  REPAIR, ROTATOR CUFF (Left)  TENOTOMY, BICEPS, ARTHROSCOPIC (Left)  DECOMPRESSION, SUBACROMIAL SPACE (Left)    Final Anesthesia Type: general      Patient location during evaluation: PACU  Patient participation: Yes- Able to Participate  Level of consciousness: awake and alert  Post-procedure vital signs: reviewed and stable  Pain management: adequate  Airway patency: patent    PONV status at discharge: No PONV  Anesthetic complications: no      Cardiovascular status: blood pressure returned to baseline and stable  Respiratory status: room air  Hydration status: euvolemic  Follow-up not needed.          Vitals Value Taken Time   /60 04/27/23 1025   Temp 36.5 °C (97.7 °F) 04/27/23 0925   Pulse 81 04/27/23 1025   Resp 16 04/27/23 1025   SpO2 95 % 04/27/23 1025         Event Time   Out of Recovery 09:20:19         Pain/Henok Score: Pain Rating Prior to Med Admin: 7 (4/27/2023  8:40 AM)  Pain Rating Post Med Admin: 0 (4/27/2023  9:25 AM)  Henok Score: 10 (4/27/2023 10:25 AM)        
Airway patent, Nasal mucosa clear. Mouth with normal mucosa. Throat has no vesicles, no oropharyngeal exudates and uvula is midline.

## 2023-04-27 NOTE — ANESTHESIA PROCEDURE NOTES
Intubation    Date/Time: 4/27/2023 7:05 AM  Performed by: Kierra Hitchcock CRNA  Authorized by: Mary Green MD     Intubation:     Induction:  Rapid sequence induction    Intubated:  Postinduction    Mask Ventilation:  Not attempted    Attempts:  1    Attempted By:  CRNA    Method of Intubation:  Video laryngoscopy    Blade:  Joe 3    Laryngeal View Grade: Grade I - full view of cords      Difficult Airway Encountered?: No      Complications:  None    Airway Device:  Oral endotracheal tube    Airway Device Size:  7.0    Style/Cuff Inflation:  Cuffed (inflated to minimal occlusive pressure)    Tube secured:  21    Secured at:  The lips    Placement Verified By:  Capnometry    Complicating Factors:  None    Findings Post-Intubation:  BS equal bilateral and atraumatic/condition of teeth unchanged    
Peripheral Block    Patient location during procedure: pre-op   Block not for primary anesthetic.  Reason for block: at surgeon's request and post-op pain management   Post-op Pain Location: left shoulder pain   Start time: 4/27/2023 6:48 AM  Timeout: 4/27/2023 6:47 AM   End time: 4/27/2023 6:55 AM    Staffing  Authorizing Provider: Mary Green MD  Performing Provider: Ramses Romero MD    Preanesthetic Checklist  Completed: patient identified, IV checked, site marked, risks and benefits discussed, surgical consent, monitors and equipment checked, pre-op evaluation and timeout performed  Peripheral Block  Patient position: sitting  Prep: ChloraPrep  Patient monitoring: heart rate, cardiac monitor, continuous pulse ox, continuous capnometry and frequent blood pressure checks  Block type: interscalene  Laterality: left  Injection technique: single shot  Needle  Needle type: Stimuplex   Needle gauge: 22 G  Needle length: 2 in  Needle localization: anatomical landmarks and ultrasound guidance   -ultrasound image captured on disc.  Assessment  Injection assessment: negative aspiration, negative parasthesia and local visualized surrounding nerve  Paresthesia pain: none  Heart rate change: no  Slow fractionated injection: yes    Medications:    Medications: ropivacaine (NAROPIN) injection 0.5% - Perineural   30 mL - 4/27/2023 7:39:00 AM    Additional Notes  VSS.  [facility] RN monitoring vitals throughout procedure.  Patient tolerated procedure well.             
Spontaneous, unlabored and symmetrical

## 2023-04-27 NOTE — CHAPLAIN
04/27/23 1412   Clinical Encounter Type   Visit Type Initial Visit   Visit Category Post-Op   Visited With Patient and family together   Number of Family Visited 1   Length of Visit 10 Minutes   Patient Spiritual Encounters   Care Provided Compassionate presence;Reflective listening   Comments - Patient pt declined spiritual care   Family Spiritual Encounters   Care Provided Compassionate presence

## 2023-04-28 VITALS
OXYGEN SATURATION: 95 % | HEART RATE: 81 BPM | DIASTOLIC BLOOD PRESSURE: 60 MMHG | TEMPERATURE: 98 F | RESPIRATION RATE: 16 BRPM | SYSTOLIC BLOOD PRESSURE: 131 MMHG

## 2023-06-02 ENCOUNTER — PATIENT MESSAGE (OUTPATIENT)
Dept: CARDIOLOGY | Facility: CLINIC | Age: 83
End: 2023-06-02
Payer: MEDICARE

## 2023-06-07 NOTE — ADDENDUM NOTE
Addendum  created 06/07/23 0819 by Mary Green MD    Clinical Note Signed, Diagnosis association updated, Intraprocedure Blocks edited, SmartForm saved

## 2023-06-29 ENCOUNTER — TELEPHONE (OUTPATIENT)
Dept: CARDIOLOGY | Facility: CLINIC | Age: 83
End: 2023-06-29
Payer: MEDICARE

## 2023-07-21 ENCOUNTER — OFFICE VISIT (OUTPATIENT)
Dept: CARDIOLOGY | Facility: CLINIC | Age: 83
End: 2023-07-21
Payer: MEDICARE

## 2023-07-21 VITALS — HEART RATE: 77 BPM | DIASTOLIC BLOOD PRESSURE: 70 MMHG | SYSTOLIC BLOOD PRESSURE: 146 MMHG | OXYGEN SATURATION: 98 %

## 2023-07-21 DIAGNOSIS — I10 ESSENTIAL HYPERTENSION: Primary | ICD-10-CM

## 2023-07-21 PROCEDURE — 99999 PR PBB SHADOW E&M-EST. PATIENT-LVL IV: CPT | Mod: PBBFAC,,, | Performed by: INTERNAL MEDICINE

## 2023-07-21 PROCEDURE — 99999 PR PBB SHADOW E&M-EST. PATIENT-LVL IV: ICD-10-PCS | Mod: PBBFAC,,, | Performed by: INTERNAL MEDICINE

## 2023-07-21 PROCEDURE — 99214 OFFICE O/P EST MOD 30 MIN: CPT | Mod: S$PBB,,, | Performed by: INTERNAL MEDICINE

## 2023-07-21 PROCEDURE — 99214 PR OFFICE/OUTPT VISIT, EST, LEVL IV, 30-39 MIN: ICD-10-PCS | Mod: S$PBB,,, | Performed by: INTERNAL MEDICINE

## 2023-07-21 PROCEDURE — 99214 OFFICE O/P EST MOD 30 MIN: CPT | Mod: PBBFAC | Performed by: INTERNAL MEDICINE

## 2023-07-21 RX ORDER — CLONIDINE HYDROCHLORIDE 0.1 MG/1
0.1 TABLET ORAL 2 TIMES DAILY
Qty: 60 TABLET | Refills: 11 | Status: SHIPPED | OUTPATIENT
Start: 2023-07-21 | End: 2024-07-20

## 2023-07-21 NOTE — PROGRESS NOTES
Cardiology    7/21/2023  1:15 PM    Problem list  Patient Active Problem List   Diagnosis    Hiatal hernia    Depression    Elevated blood sugar    Age-related osteoporosis without current pathological fracture    Chest pain    Essential hypertension    BMI 34.0-34.9,adult    Migraine with aura and without status migrainosus, not intractable    GERD (gastroesophageal reflux disease)    Nontraumatic complete tear of left rotator cuff    Degenerative superior labral anterior-to-posterior (SLAP) tear of left shoulder    Impingement syndrome of left shoulder       CC:  F/u    HPI:  She is here for f/u.  She had episodes of nausea, vomiting, headaches, auras.  She suffers with migraines and hiatal hernia.  She checked her Bp and was concerned with 1 episode of .  Mostly, her SBP have been 130-140 range without any medications.  She is reluctant to take medications.    Medications  Current Outpatient Medications   Medication Sig Dispense Refill    ascorbic acid, vitamin C, (VITAMIN C) 1000 MG tablet Take 1,000 mg by mouth once daily.      aspirin (ECOTRIN) 81 MG EC tablet Take 81 mg by mouth once daily.      cetirizine (ZYRTEC) 10 MG tablet Take 10 mg by mouth once daily.      co-enzyme Q-10 30 mg capsule Take 30 mg by mouth 3 (three) times daily.      famotidine (PEPCID) 10 MG tablet Take 10 mg by mouth 2 (two) times daily.      magnesium 200 mg Tab Take 400 mg by mouth once.      multivitamin (THERAGRAN) per tablet Take 1 tablet by mouth once daily.      omega-3 fatty acids 1,000 mg Cap Take by mouth.      pantoprazole (PROTONIX) 40 MG tablet TAKE 1 TABLET(40 MG) BY MOUTH EVERY DAY 90 tablet 1    sucralfate (CARAFATE) 1 gram tablet Take 1 tablet (1 g total) by mouth 4 (four) times daily as needed. 45 tablet 2    vitamin D (VITAMIN D3) 1000 units Tab Take 1,000 Units by mouth once daily.      acetaminophen (TYLENOL) 500 MG tablet Take 500 mg by mouth every 6 (six) hours as needed for Pain.      DYMISTA  137-50 mcg/spray [place] nassal spray SPRAY ONCE IEN BID 1 each 1    HYDROcodone-acetaminophen (NORCO)  mg per tablet Take 1 tablet by mouth every 4 to 6 hours as needed for Pain. (Patient not taking: Reported on 7/21/2023) 40 tablet 0    meclizine (ANTIVERT) 12.5 mg tablet Take 1 tablet (12.5 mg total) by mouth 3 (three) times daily as needed. (Patient not taking: Reported on 7/21/2023) 21 tablet 0    minocycline (MINOCIN,DYNACIN) 100 MG capsule Take 1 capsule (100 mg total) by mouth 2 (two) times daily. (Patient not taking: Reported on 7/21/2023) 28 capsule 0    ondansetron (ZOFRAN) 8 MG tablet Take 1 tablet (8 mg total) by mouth every 8 (eight) hours as needed for Nausea. (Patient not taking: Reported on 7/21/2023) 20 tablet 1    ondansetron (ZOFRAN-ODT) 4 MG TbDL Take 2 tablets (8 mg total) by mouth every 8 (eight) hours as needed (nausea). (Patient not taking: Reported on 7/21/2023) 20 tablet 1    sars-cov-2, covid-19 omicron, (MODERNA COVID BIVAL,18Y UP,-PF) 50 mcg/0.5 mL injection Inject into the muscle. 0.5 mL 0    sulfamethoxazole-trimethoprim 800-160mg (BACTRIM DS) 800-160 mg Tab Take 1 tablet by mouth 2 (two) times daily. (Patient not taking: Reported on 7/21/2023) 10 tablet 0     No current facility-administered medications for this visit.     Facility-Administered Medications Ordered in Other Visits   Medication Dose Route Frequency Provider Last Rate Last Admin    tranexamic acid injection Soln 1,000 mg  1,000 mg Intravenous On Call Procedure Gus Groves MD          Prior to Admission medications    Medication Sig Start Date End Date Taking? Authorizing Provider   ascorbic acid, vitamin C, (VITAMIN C) 1000 MG tablet Take 1,000 mg by mouth once daily.   Yes Historical Provider   aspirin (ECOTRIN) 81 MG EC tablet Take 81 mg by mouth once daily.   Yes Historical Provider   cetirizine (ZYRTEC) 10 MG tablet Take 10 mg by mouth once daily.   Yes Historical Provider   co-enzyme Q-10 30 mg capsule Take  30 mg by mouth 3 (three) times daily.   Yes Historical Provider   famotidine (PEPCID) 10 MG tablet Take 10 mg by mouth 2 (two) times daily.   Yes Historical Provider   magnesium 200 mg Tab Take 400 mg by mouth once.   Yes Historical Provider   multivitamin (THERAGRAN) per tablet Take 1 tablet by mouth once daily.   Yes Historical Provider   omega-3 fatty acids 1,000 mg Cap Take by mouth.   Yes Historical Provider   pantoprazole (PROTONIX) 40 MG tablet TAKE 1 TABLET(40 MG) BY MOUTH EVERY DAY 1/24/23  Yes Johanne Leonard MD   sucralfate (CARAFATE) 1 gram tablet Take 1 tablet (1 g total) by mouth 4 (four) times daily as needed. 6/13/23  Yes Johanne Leonard MD   vitamin D (VITAMIN D3) 1000 units Tab Take 1,000 Units by mouth once daily.   Yes Historical Provider   acetaminophen (TYLENOL) 500 MG tablet Take 500 mg by mouth every 6 (six) hours as needed for Pain.    Historical Provider   DYMISTA 137-50 mcg/spray [place] nassal spray SPRAY ONCE IEN BID 5/19/23   Johanne Leonard MD   HYDROcodone-acetaminophen (NORCO)  mg per tablet Take 1 tablet by mouth every 4 to 6 hours as needed for Pain.  Patient not taking: Reported on 7/21/2023 4/27/23   Gus Groves MD   meclizine (ANTIVERT) 12.5 mg tablet Take 1 tablet (12.5 mg total) by mouth 3 (three) times daily as needed.  Patient not taking: Reported on 7/21/2023 8/10/22   Johanne Leonard MD   minocycline (MINOCIN,DYNACIN) 100 MG capsule Take 1 capsule (100 mg total) by mouth 2 (two) times daily.  Patient not taking: Reported on 7/21/2023 4/27/23   Gus Groves MD   ondansetron (ZOFRAN) 8 MG tablet Take 1 tablet (8 mg total) by mouth every 8 (eight) hours as needed for Nausea.  Patient not taking: Reported on 7/21/2023 3/20/23   Johanne Leonard MD   ondansetron (ZOFRAN-ODT) 4 MG TbDL Take 2 tablets (8 mg total) by mouth every 8 (eight) hours as needed (nausea).  Patient not taking: Reported on 7/21/2023 6/14/23   Juanito Monique MD    sars-cov-2, covid-19 omicron, (MODERNA COVID BIVAL,18Y UP,-PF) 50 mcg/0.5 mL injection Inject into the muscle. 10/7/22   Apple Huitron MD   sulfamethoxazole-trimethoprim 800-160mg (BACTRIM DS) 800-160 mg Tab Take 1 tablet by mouth 2 (two) times daily.  Patient not taking: Reported on 7/21/2023 6/9/23   Johanne Leonard MD   traMADoL (ULTRAM) 50 mg tablet  4/20/23 7/21/23  Historical Provider         History  Past Medical History:   Diagnosis Date    Allergy     Essential hypertension 08/31/2020    does not take any medication - Cardiologist is rubén    Hiatal hernia     Migraine     Stomach ulcer     TB (pulmonary tuberculosis)      Past Surgical History:   Procedure Laterality Date    ARTHROSCOPIC TENOTOMY OF BICEPS TENDON Left 4/27/2023    Procedure: TENOTOMY, BICEPS, ARTHROSCOPIC;  Surgeon: Gus Groves MD;  Location: [facility] OR;  Service: Orthopedics;  Laterality: Left;    DECOMPRESSION OF SUBACROMIAL SPACE Left 4/27/2023    Procedure: DECOMPRESSION, SUBACROMIAL SPACE;  Surgeon: Gus Groves MD;  Location: [facility] OR;  Service: Orthopedics;  Laterality: Left;    EXAMINATION UNDER ANESTHESIA Left 4/27/2023    Procedure: EXAM UNDER ANESTHESIA;  Surgeon: Gus Groves MD;  Location: [facility] OR;  Service: Orthopedics;  Laterality: Left;    EXCISION OF BURSA Left 4/27/2023    Procedure: BURSECTOMY;  Surgeon: Gus Groves MD;  Location: [facility] OR;  Service: Orthopedics;  Laterality: Left;    ROTATOR CUFF REPAIR Left 4/27/2023    Procedure: REPAIR, ROTATOR CUFF;  Surgeon: Gus Groves MD;  Location: [facility] OR;  Service: Orthopedics;  Laterality: Left;    SHOULDER ARTHROSCOPY Left 4/27/2023    Procedure: ARTHROSCOPY, SHOULDER;  Surgeon: Gus Groves MD;  Location: [facility] OR;  Service: Orthopedics;  Laterality: Left;    THORACENTESIS       Social History     Socioeconomic History    Marital status:    Tobacco Use    Smoking status: Never    Smokeless tobacco: Never   Substance and Sexual  Activity    Alcohol use: Yes     Comment: socially    Drug use: Never         Allergies  Review of patient's allergies indicates:   Allergen Reactions    Hydrocortisone Swelling    Aspirin      Pt is okay to take 81mg daily    Cortisone     Hydrocodone Nausea And Vomiting         Review of Systems   Review of Systems   Constitutional: Negative for decreased appetite, fever and weight loss.   HENT:  Negative for congestion and nosebleeds.    Eyes:  Negative for double vision, vision loss in left eye, vision loss in right eye and visual disturbance.   Cardiovascular:  Negative for chest pain, claudication, cyanosis, dyspnea on exertion, irregular heartbeat, leg swelling, near-syncope, orthopnea, palpitations, paroxysmal nocturnal dyspnea and syncope.   Respiratory:  Negative for cough, hemoptysis, shortness of breath, sleep disturbances due to breathing, snoring, sputum production and wheezing.    Endocrine: Negative for cold intolerance and heat intolerance.   Skin:  Negative for nail changes and rash.   Musculoskeletal:  Negative for joint pain, muscle cramps, muscle weakness and myalgias.   Gastrointestinal:  Negative for change in bowel habit, heartburn, hematemesis, hematochezia, hemorrhoids and melena.   Neurological:  Negative for dizziness, focal weakness and headaches.       Physical Exam  Wt Readings from Last 1 Encounters:   04/21/23 86.2 kg (190 lb)     BP Readings from Last 3 Encounters:   07/21/23 (!) 146/70   04/27/23 131/60   04/21/23 (!) 183/93     Pulse Readings from Last 1 Encounters:   07/21/23 77     There is no height or weight on file to calculate BMI.    Physical Exam  Constitutional:       Appearance: She is well-developed.   HENT:      Head: Atraumatic.   Eyes:      General: No scleral icterus.  Neck:      Vascular: Normal carotid pulses. No carotid bruit, hepatojugular reflux or JVD.   Cardiovascular:      Rate and Rhythm: Normal rate and regular rhythm.      Chest Wall: PMI is not  displaced.      Pulses: Intact distal pulses.           Carotid pulses are 2+ on the right side and 2+ on the left side.       Radial pulses are 2+ on the right side and 2+ on the left side.        Dorsalis pedis pulses are 2+ on the right side and 2+ on the left side.      Heart sounds: Normal heart sounds, S1 normal and S2 normal. No murmur heard.    No friction rub.   Pulmonary:      Effort: Pulmonary effort is normal. No respiratory distress.      Breath sounds: Normal breath sounds. No stridor. No wheezing or rales.   Chest:      Chest wall: No tenderness.   Abdominal:      General: Bowel sounds are normal.      Palpations: Abdomen is soft.   Musculoskeletal:      Cervical back: Neck supple. No edema.   Skin:     General: Skin is warm and dry.      Nails: There is no clubbing.   Neurological:      Mental Status: She is alert and oriented to person, place, and time.   Psychiatric:         Behavior: Behavior normal.         Thought Content: Thought content normal.           Assessment  1. Essential hypertension  stable    2. BMI 34.0-34.9,adult  unchanged        Plan and Discussion  Discussed with patient and her .  Recommend clonidine 0.1mg BID PRN SBP >160.      Follow Up  6 months      Jose Francisco Perez MD, F.A.C.C, F.S.C.A.I.        30 minutes were spent in chart review, documentation and review of results, and evaluation, treatment, and counseling of patient on the same day of service.    Disclaimer: This document was created using voice recognition software ("[employer] Direct). Although it may be edited, this document may contain errors related to incorrect recognition of the spoken word. Please call the physician if clarification is needed.

## 2023-08-07 ENCOUNTER — TELEPHONE (OUTPATIENT)
Dept: VASCULAR SURGERY | Facility: CLINIC | Age: 83
End: 2023-08-07
Payer: MEDICARE

## 2023-08-07 NOTE — TELEPHONE ENCOUNTER
Attempted to contact pt in response to message. No documentation or imaging in pt's chart to indicate pt has carotid artery stenosis. Return call requested to discuss. ----- Message from Alessandra Song sent at 8/7/2023  8:03 AM CDT -----  Contact: Avila/  .Type:  Same Day Appointment Request    Caller is requesting a same day appointment.  Caller declined first available appointment listed below.    Name of Caller:Avila from /Johanne Leonard MD in [facility]  When is the first available appointment?08/31/23  Symptoms: Carotid stenosis, right [I65.21]  Best Call Back Number: [phone] (home)      Additional Information:  referral in WQ/    Thanks    Alessandra MYRICK

## 2023-08-07 NOTE — TELEPHONE ENCOUNTER
----- Message from Crow Lozada sent at 8/7/2023  9:52 AM CDT -----  Regarding: Pt Advice  Contact: pt [phone]  Missed Callback    Pt returning callback from missed call. Requesting to speak with somebody in Dr. Roach  office. Please call @[phone]

## 2023-08-14 ENCOUNTER — LAB VISIT (OUTPATIENT)
Dept: LAB | Facility: HOSPITAL | Age: 83
End: 2023-08-14
Payer: MEDICARE

## 2023-08-14 DIAGNOSIS — R42 DIZZINESS: ICD-10-CM

## 2023-08-14 DIAGNOSIS — R73.9 ELEVATED BLOOD SUGAR: ICD-10-CM

## 2023-08-14 DIAGNOSIS — Z79.899 MEDICATION MANAGEMENT: ICD-10-CM

## 2023-08-14 DIAGNOSIS — I10 ESSENTIAL HYPERTENSION: ICD-10-CM

## 2023-08-14 DIAGNOSIS — G43.109 MIGRAINE WITH AURA AND WITHOUT STATUS MIGRAINOSUS, NOT INTRACTABLE: ICD-10-CM

## 2023-08-14 LAB
ALBUMIN SERPL BCP-MCNC: 3.8 G/DL (ref 3.5–5.2)
ALP SERPL-CCNC: 81 U/L (ref 55–135)
ALT SERPL W/O P-5'-P-CCNC: 19 U/L (ref 10–44)
ANION GAP SERPL CALC-SCNC: 9 MMOL/L (ref 8–16)
AST SERPL-CCNC: 19 U/L (ref 10–40)
BASOPHILS # BLD AUTO: 0.04 K/UL (ref 0–0.2)
BASOPHILS NFR BLD: 0.6 % (ref 0–1.9)
BILIRUB SERPL-MCNC: 0.4 MG/DL (ref 0.1–1)
BUN SERPL-MCNC: 18 MG/DL (ref 8–23)
CALCIUM SERPL-MCNC: 9.3 MG/DL (ref 8.7–10.5)
CHLORIDE SERPL-SCNC: 107 MMOL/L (ref 95–110)
CO2 SERPL-SCNC: 22 MMOL/L (ref 23–29)
CREAT SERPL-MCNC: 0.7 MG/DL (ref 0.5–1.4)
DIFFERENTIAL METHOD: ABNORMAL
EOSINOPHIL # BLD AUTO: 0.1 K/UL (ref 0–0.5)
EOSINOPHIL NFR BLD: 1.9 % (ref 0–8)
ERYTHROCYTE [DISTWIDTH] IN BLOOD BY AUTOMATED COUNT: 13.1 % (ref 11.5–14.5)
EST. GFR  (NO RACE VARIABLE): >60 ML/MIN/1.73 M^2
ESTIMATED AVG GLUCOSE: 108 MG/DL (ref 68–131)
GLUCOSE SERPL-MCNC: 123 MG/DL (ref 70–110)
HBA1C MFR BLD: 5.4 % (ref 4–5.6)
HCT VFR BLD AUTO: 46 % (ref 37–48.5)
HGB BLD-MCNC: 15.3 G/DL (ref 12–16)
IMM GRANULOCYTES # BLD AUTO: 0.02 K/UL (ref 0–0.04)
IMM GRANULOCYTES NFR BLD AUTO: 0.3 % (ref 0–0.5)
LYMPHOCYTES # BLD AUTO: 1.7 K/UL (ref 1–4.8)
LYMPHOCYTES NFR BLD: 24.1 % (ref 18–48)
MCH RBC QN AUTO: 31.7 PG (ref 27–31)
MCHC RBC AUTO-ENTMCNC: 33.3 G/DL (ref 32–36)
MCV RBC AUTO: 95 FL (ref 82–98)
MONOCYTES # BLD AUTO: 0.4 K/UL (ref 0.3–1)
MONOCYTES NFR BLD: 5.6 % (ref 4–15)
NEUTROPHILS # BLD AUTO: 4.7 K/UL (ref 1.8–7.7)
NEUTROPHILS NFR BLD: 67.5 % (ref 38–73)
NRBC BLD-RTO: 0 /100 WBC
PLATELET # BLD AUTO: 299 K/UL (ref 150–450)
PMV BLD AUTO: 11 FL (ref 9.2–12.9)
POTASSIUM SERPL-SCNC: 4.1 MMOL/L (ref 3.5–5.1)
PROT SERPL-MCNC: 7 G/DL (ref 6–8.4)
RBC # BLD AUTO: 4.82 M/UL (ref 4–5.4)
SODIUM SERPL-SCNC: 138 MMOL/L (ref 136–145)
T4 FREE SERPL-MCNC: 1.02 NG/DL (ref 0.71–1.51)
TSH SERPL DL<=0.005 MIU/L-ACNC: 0.84 UIU/ML (ref 0.4–4)
VIT B12 SERPL-MCNC: >2000 PG/ML (ref 210–950)
WBC # BLD AUTO: 7.01 K/UL (ref 3.9–12.7)

## 2023-08-14 PROCEDURE — 85025 COMPLETE CBC W/AUTO DIFF WBC: CPT | Performed by: INTERNAL MEDICINE

## 2023-08-14 PROCEDURE — 80053 COMPREHEN METABOLIC PANEL: CPT | Performed by: INTERNAL MEDICINE

## 2023-08-14 PROCEDURE — 84443 ASSAY THYROID STIM HORMONE: CPT | Performed by: INTERNAL MEDICINE

## 2023-08-14 PROCEDURE — 83036 HEMOGLOBIN GLYCOSYLATED A1C: CPT | Performed by: INTERNAL MEDICINE

## 2023-08-14 PROCEDURE — 82607 VITAMIN B-12: CPT | Performed by: INTERNAL MEDICINE

## 2023-08-14 PROCEDURE — 84439 ASSAY OF FREE THYROXINE: CPT | Performed by: INTERNAL MEDICINE

## 2023-08-14 PROCEDURE — 36415 COLL VENOUS BLD VENIPUNCTURE: CPT | Mod: PO | Performed by: INTERNAL MEDICINE

## 2023-10-17 ENCOUNTER — LAB VISIT (OUTPATIENT)
Dept: LAB | Facility: HOSPITAL | Age: 83
End: 2023-10-17
Payer: MEDICARE

## 2023-10-17 DIAGNOSIS — I10 ESSENTIAL HYPERTENSION: ICD-10-CM

## 2023-10-17 DIAGNOSIS — R73.9 ELEVATED BLOOD SUGAR: ICD-10-CM

## 2023-10-17 DIAGNOSIS — Z79.899 MEDICATION MANAGEMENT: ICD-10-CM

## 2023-10-17 LAB
ALBUMIN SERPL BCP-MCNC: 3.9 G/DL (ref 3.5–5.2)
ALP SERPL-CCNC: 70 U/L (ref 55–135)
ALT SERPL W/O P-5'-P-CCNC: 18 U/L (ref 10–44)
ANION GAP SERPL CALC-SCNC: 10 MMOL/L (ref 8–16)
AST SERPL-CCNC: 22 U/L (ref 10–40)
BILIRUB SERPL-MCNC: 0.7 MG/DL (ref 0.1–1)
BUN SERPL-MCNC: 17 MG/DL (ref 8–23)
CALCIUM SERPL-MCNC: 9.8 MG/DL (ref 8.7–10.5)
CHLORIDE SERPL-SCNC: 106 MMOL/L (ref 95–110)
CHOLEST SERPL-MCNC: 174 MG/DL (ref 120–199)
CHOLEST/HDLC SERPL: 2.6 {RATIO} (ref 2–5)
CO2 SERPL-SCNC: 24 MMOL/L (ref 23–29)
CREAT SERPL-MCNC: 0.8 MG/DL (ref 0.5–1.4)
EST. GFR  (NO RACE VARIABLE): >60 ML/MIN/1.73 M^2
GLUCOSE SERPL-MCNC: 93 MG/DL (ref 70–110)
HDLC SERPL-MCNC: 66 MG/DL (ref 40–75)
HDLC SERPL: 37.9 % (ref 20–50)
LDLC SERPL CALC-MCNC: 92.6 MG/DL (ref 63–159)
NONHDLC SERPL-MCNC: 108 MG/DL
POTASSIUM SERPL-SCNC: 4.1 MMOL/L (ref 3.5–5.1)
PROT SERPL-MCNC: 7.2 G/DL (ref 6–8.4)
SODIUM SERPL-SCNC: 140 MMOL/L (ref 136–145)
TRIGL SERPL-MCNC: 77 MG/DL (ref 30–150)
TSH SERPL DL<=0.005 MIU/L-ACNC: 1.18 UIU/ML (ref 0.4–4)

## 2023-10-17 PROCEDURE — 80061 LIPID PANEL: CPT | Performed by: INTERNAL MEDICINE

## 2023-10-17 PROCEDURE — 84443 ASSAY THYROID STIM HORMONE: CPT | Performed by: INTERNAL MEDICINE

## 2023-10-17 PROCEDURE — 36415 COLL VENOUS BLD VENIPUNCTURE: CPT | Mod: PO | Performed by: INTERNAL MEDICINE

## 2023-10-17 PROCEDURE — 80053 COMPREHEN METABOLIC PANEL: CPT | Performed by: INTERNAL MEDICINE

## 2024-01-12 ENCOUNTER — OFFICE VISIT (OUTPATIENT)
Dept: CARDIOLOGY | Facility: CLINIC | Age: 84
End: 2024-01-12
Payer: MEDICARE

## 2024-01-12 VITALS
BODY MASS INDEX: 35.67 KG/M2 | DIASTOLIC BLOOD PRESSURE: 84 MMHG | SYSTOLIC BLOOD PRESSURE: 116 MMHG | HEART RATE: 67 BPM | OXYGEN SATURATION: 97 % | WEIGHT: 195 LBS

## 2024-01-12 DIAGNOSIS — I10 ESSENTIAL HYPERTENSION: Primary | ICD-10-CM

## 2024-01-12 DIAGNOSIS — R07.9 CHEST PAIN, UNSPECIFIED TYPE: ICD-10-CM

## 2024-01-12 PROCEDURE — 99999 PR PBB SHADOW E&M-EST. PATIENT-LVL IV: CPT | Mod: PBBFAC,,, | Performed by: INTERNAL MEDICINE

## 2024-01-12 PROCEDURE — 99214 OFFICE O/P EST MOD 30 MIN: CPT | Mod: PBBFAC | Performed by: INTERNAL MEDICINE

## 2024-01-12 PROCEDURE — 99214 OFFICE O/P EST MOD 30 MIN: CPT | Mod: S$PBB,,, | Performed by: INTERNAL MEDICINE

## 2024-01-12 NOTE — PROGRESS NOTES
Cardiology    1/12/2024  11:20 AM    Problem list  Patient Active Problem List   Diagnosis    Hiatal hernia    Depression    Elevated blood sugar    Age-related osteoporosis without current pathological fracture    Chest pain    Essential hypertension    BMI 34.0-34.9,adult    Migraine with aura and without status migrainosus, not intractable    GERD (gastroesophageal reflux disease)    Nontraumatic complete tear of left rotator cuff    Degenerative superior labral anterior-to-posterior (SLAP) tear of left shoulder    Impingement syndrome of left shoulder       CC:  F/u    HPI:  She is here for f/u.  She has no new complaints.  She had COVID over Christmas break.  Stated that she was dizzy this morning when she checked her blood pressure was 130/80 with a heart rate of 68.  She brought in her blood pressure log which showed well controlled blood pressure.  Her blood pressure range 116/77 to  138/93.  She has not tried meclizine for her dizziness.  She already has a prescription for meclizine.    Medications  Current Outpatient Medications   Medication Sig Dispense Refill    acetaminophen (TYLENOL) 500 MG tablet Take 500 mg by mouth every 6 (six) hours as needed for Pain.      ascorbic acid, vitamin C, (VITAMIN C) 1000 MG tablet Take 1,000 mg by mouth once daily.      aspirin (ECOTRIN) 81 MG EC tablet Take 81 mg by mouth once daily.      cetirizine (ZYRTEC) 10 MG tablet Take 10 mg by mouth once daily.      co-enzyme Q-10 30 mg capsule Take 30 mg by mouth 3 (three) times daily.      DYMISTA 137-50 mcg/spray [place] nassal spray SPRAY ONCE IEN BID 1 each 1    famotidine (PEPCID) 10 MG tablet Take 10 mg by mouth 2 (two) times daily.      magnesium 200 mg Tab Take 400 mg by mouth once.      multivitamin (THERAGRAN) per tablet Take 1 tablet by mouth once daily.      omega-3 fatty acids 1,000 mg Cap Take by mouth.      pantoprazole (PROTONIX) 40 MG tablet TAKE 1 TABLET(40 MG) BY MOUTH EVERY DAY 90 tablet 1     sucralfate (CARAFATE) 1 gram tablet Take 1 tablet (1 g total) by mouth 4 (four) times daily as needed. 45 tablet 2    vitamin D (VITAMIN D3) 1000 units Tab Take 1,000 Units by mouth once daily.      cloNIDine (CATAPRES) 0.1 MG tablet Take 1 tablet (0.1 mg total) by mouth 2 (two) times daily. (Patient not taking: Reported on 1/12/2024) 60 tablet 11    meclizine (ANTIVERT) 12.5 mg tablet Take 1 tablet (12.5 mg total) by mouth 3 (three) times daily as needed. (Patient not taking: Reported on 1/12/2024) 21 tablet 0    minocycline (MINOCIN,DYNACIN) 100 MG capsule Take 1 capsule (100 mg total) by mouth 2 (two) times daily. (Patient not taking: Reported on 1/12/2024) 28 capsule 0    ondansetron (ZOFRAN) 8 MG tablet Take 1 tablet (8 mg total) by mouth every 8 (eight) hours as needed for Nausea. (Patient not taking: Reported on 1/12/2024) 20 tablet 1    ondansetron (ZOFRAN-ODT) 4 MG TbDL Take 2 tablets (8 mg total) by mouth every 8 (eight) hours as needed (nausea). (Patient not taking: Reported on 1/12/2024) 20 tablet 1    sars-cov-2, covid-19 omicron, (MODERNA COVID BIVAL,18Y UP,-PF) 50 mcg/0.5 mL injection Inject into the muscle. (Patient not taking: Reported on 1/12/2024) 0.5 mL 0    sulfamethoxazole-trimethoprim 800-160mg (BACTRIM DS) 800-160 mg Tab Take 1 tablet by mouth 2 (two) times daily. (Patient not taking: Reported on 1/12/2024) 10 tablet 0     No current facility-administered medications for this visit.     Facility-Administered Medications Ordered in Other Visits   Medication Dose Route Frequency Provider Last Rate Last Admin    tranexamic acid injection Soln 1,000 mg  1,000 mg Intravenous On Call Procedure Gus Groves MD          Prior to Admission medications    Medication Sig Start Date End Date Taking? Authorizing Provider   ascorbic acid, vitamin C, (VITAMIN C) 1000 MG tablet Take 1,000 mg by mouth once daily.   Yes Historical Provider   aspirin (ECOTRIN) 81 MG EC tablet Take 81 mg by mouth once  daily.   Yes Historical Provider   cetirizine (ZYRTEC) 10 MG tablet Take 10 mg by mouth once daily.   Yes Historical Provider   co-enzyme Q-10 30 mg capsule Take 30 mg by mouth 3 (three) times daily.   Yes Historical Provider   famotidine (PEPCID) 10 MG tablet Take 10 mg by mouth 2 (two) times daily.   Yes Historical Provider   magnesium 200 mg Tab Take 400 mg by mouth once.   Yes Historical Provider   multivitamin (THERAGRAN) per tablet Take 1 tablet by mouth once daily.   Yes Historical Provider   omega-3 fatty acids 1,000 mg Cap Take by mouth.   Yes Historical Provider   pantoprazole (PROTONIX) 40 MG tablet TAKE 1 TABLET(40 MG) BY MOUTH EVERY DAY 1/24/23  Yes Johanne Leonard MD   sucralfate (CARAFATE) 1 gram tablet Take 1 tablet (1 g total) by mouth 4 (four) times daily as needed. 6/13/23  Yes Johanne Leonard MD   vitamin D (VITAMIN D3) 1000 units Tab Take 1,000 Units by mouth once daily.   Yes Historical Provider   acetaminophen (TYLENOL) 500 MG tablet Take 500 mg by mouth every 6 (six) hours as needed for Pain.    Historical Provider   DYMISTA 137-50 mcg/spray [place] nassal spray SPRAY ONCE IEN BID 5/19/23   Johanne Leonard MD   HYDROcodone-acetaminophen (NORCO)  mg per tablet Take 1 tablet by mouth every 4 to 6 hours as needed for Pain.  Patient not taking: Reported on 7/21/2023 4/27/23   Gus Groves MD   meclizine (ANTIVERT) 12.5 mg tablet Take 1 tablet (12.5 mg total) by mouth 3 (three) times daily as needed.  Patient not taking: Reported on 7/21/2023 8/10/22   Johanne Leonard MD   minocycline (MINOCIN,DYNACIN) 100 MG capsule Take 1 capsule (100 mg total) by mouth 2 (two) times daily.  Patient not taking: Reported on 7/21/2023 4/27/23   Gus Groves MD   ondansetron (ZOFRAN) 8 MG tablet Take 1 tablet (8 mg total) by mouth every 8 (eight) hours as needed for Nausea.  Patient not taking: Reported on 7/21/2023 3/20/23   Johanne Leonard MD   ondansetron (ZOFRAN-ODT) 4  MG TbDL Take 2 tablets (8 mg total) by mouth every 8 (eight) hours as needed (nausea).  Patient not taking: Reported on 7/21/2023 6/14/23   Juanito Monique MD   sars-cov-2, covid-19 omicron, (MODERNA COVID BIVAL,18Y UP,-PF) 50 mcg/0.5 mL injection Inject into the muscle. 10/7/22   Apple Huitron MD   sulfamethoxazole-trimethoprim 800-160mg (BACTRIM DS) 800-160 mg Tab Take 1 tablet by mouth 2 (two) times daily.  Patient not taking: Reported on 7/21/2023 6/9/23   Johanne Leonard MD   traMADoL (ULTRAM) 50 mg tablet  4/20/23 7/21/23  Historical Provider         History  Past Medical History:   Diagnosis Date    Allergy     Essential hypertension 08/31/2020    does not take any medication - Cardiologist is rubén    Hiatal hernia     Migraine     Stomach ulcer     TB (pulmonary tuberculosis)      Past Surgical History:   Procedure Laterality Date    ARTHROSCOPIC TENOTOMY OF BICEPS TENDON Left 4/27/2023    Procedure: TENOTOMY, BICEPS, ARTHROSCOPIC;  Surgeon: Gus Groves MD;  Location: [facility];  Service: Orthopedics;  Laterality: Left;    DECOMPRESSION OF SUBACROMIAL SPACE Left 4/27/2023    Procedure: DECOMPRESSION, SUBACROMIAL SPACE;  Surgeon: Gus Groves MD;  Location: [facility] OR;  Service: Orthopedics;  Laterality: Left;    EXAMINATION UNDER ANESTHESIA Left 4/27/2023    Procedure: EXAM UNDER ANESTHESIA;  Surgeon: Gus Groves MD;  Location: [facility] OR;  Service: Orthopedics;  Laterality: Left;    EXCISION OF BURSA Left 4/27/2023    Procedure: BURSECTOMY;  Surgeon: Gus Groves MD;  Location: [facility] OR;  Service: Orthopedics;  Laterality: Left;    ROTATOR CUFF REPAIR Left 4/27/2023    Procedure: REPAIR, ROTATOR CUFF;  Surgeon: Gus Groves MD;  Location: [facility] OR;  Service: Orthopedics;  Laterality: Left;    SHOULDER ARTHROSCOPY Left 4/27/2023    Procedure: ARTHROSCOPY, SHOULDER;  Surgeon: Gus Groves MD;  Location: [facility] OR;  Service: Orthopedics;  Laterality: Left;    THORACENTESIS        Social History     Socioeconomic History    Marital status:    Tobacco Use    Smoking status: Never    Smokeless tobacco: Never   Substance and Sexual Activity    Alcohol use: Yes     Comment: socially    Drug use: Never         Allergies  Review of patient's allergies indicates:   Allergen Reactions    Hydrocortisone Swelling    Aspirin      Pt is okay to take 81mg daily    Cortisone     Hydrocodone Nausea And Vomiting         Review of Systems   Review of Systems   Constitutional: Negative for decreased appetite, fever and weight loss.   HENT:  Negative for congestion and nosebleeds.    Eyes:  Negative for double vision, vision loss in left eye, vision loss in right eye and visual disturbance.   Cardiovascular:  Negative for chest pain, claudication, cyanosis, dyspnea on exertion, irregular heartbeat, leg swelling, near-syncope, orthopnea, palpitations, paroxysmal nocturnal dyspnea and syncope.   Respiratory:  Negative for cough, hemoptysis, shortness of breath, sleep disturbances due to breathing, snoring, sputum production and wheezing.    Endocrine: Negative for cold intolerance and heat intolerance.   Skin:  Negative for nail changes and rash.   Musculoskeletal:  Negative for joint pain, muscle cramps, muscle weakness and myalgias.   Gastrointestinal:  Negative for change in bowel habit, heartburn, hematemesis, hematochezia, hemorrhoids and melena.   Neurological:  Negative for dizziness, focal weakness and headaches.         Physical Exam  Wt Readings from Last 1 Encounters:   01/12/24 88.5 kg (195 lb)     BP Readings from Last 3 Encounters:   01/12/24 116/84   10/24/23 112/80   07/21/23 (!) 146/70     Pulse Readings from Last 1 Encounters:   01/12/24 67     Body mass index is 35.67 kg/m².    Physical Exam  Constitutional:       Appearance: She is well-developed.   HENT:      Head: Atraumatic.   Eyes:      General: No scleral icterus.  Neck:      Vascular: Normal carotid pulses. No carotid bruit,  hepatojugular reflux or JVD.   Cardiovascular:      Rate and Rhythm: Normal rate and regular rhythm.      Chest Wall: PMI is not displaced.      Pulses: Intact distal pulses.           Carotid pulses are 2+ on the right side and 2+ on the left side.       Radial pulses are 2+ on the right side and 2+ on the left side.        Dorsalis pedis pulses are 2+ on the right side and 2+ on the left side.      Heart sounds: Normal heart sounds, S1 normal and S2 normal. No murmur heard.     No friction rub.   Pulmonary:      Effort: Pulmonary effort is normal. No respiratory distress.      Breath sounds: Normal breath sounds. No stridor. No wheezing or rales.   Chest:      Chest wall: No tenderness.   Abdominal:      General: Bowel sounds are normal.      Palpations: Abdomen is soft.   Musculoskeletal:      Cervical back: Neck supple. No edema.   Skin:     General: Skin is warm and dry.      Nails: There is no clubbing.   Neurological:      Mental Status: She is alert and oriented to person, place, and time.   Psychiatric:         Behavior: Behavior normal.         Thought Content: Thought content normal.             Assessment  1. Essential hypertension  stable    2. BMI 34.0-34.9,adult  unchanged        Plan and Discussion  Discussed with patient and her .  Continue with current medication.  Recommend to try meclizine for dizziness since her blood pressure when she was dizzy was normal.    Follow Up  6 months      Jose Francisco Perez MD, F.A.C.C, F.S.C.A.I.        30 minutes were spent in chart review, documentation and review of results, and evaluation, treatment, and counseling of patient on the same day of service.    Disclaimer: This document was created using voice recognition software ([employer]). Although it may be edited, this document may contain errors related to incorrect recognition of the spoken word. Please call the physician if clarification is needed.

## 2024-03-13 ENCOUNTER — LAB VISIT (OUTPATIENT)
Dept: LAB | Facility: OTHER | Age: 84
End: 2024-03-13
Attending: INTERNAL MEDICINE
Payer: MEDICARE

## 2024-03-13 DIAGNOSIS — R10.32 ABDOMINAL PAIN, LEFT LOWER QUADRANT: ICD-10-CM

## 2024-03-13 DIAGNOSIS — K21.9 GASTROESOPHAGEAL REFLUX DISEASE: ICD-10-CM

## 2024-03-13 DIAGNOSIS — R11.0 NAUSEA: ICD-10-CM

## 2024-03-13 DIAGNOSIS — R53.81 MALAISE: ICD-10-CM

## 2024-03-13 DIAGNOSIS — R19.7 DIARRHEA: Primary | ICD-10-CM

## 2024-03-13 LAB
ALBUMIN SERPL BCP-MCNC: 4.2 G/DL (ref 3.5–5.2)
ALP SERPL-CCNC: 71 U/L (ref 55–135)
ALT SERPL W/O P-5'-P-CCNC: 22 U/L (ref 10–44)
ANION GAP SERPL CALC-SCNC: 11 MMOL/L (ref 8–16)
AST SERPL-CCNC: 21 U/L (ref 10–40)
BASOPHILS # BLD AUTO: 0.03 K/UL (ref 0–0.2)
BASOPHILS NFR BLD: 0.4 % (ref 0–1.9)
BILIRUB SERPL-MCNC: 0.6 MG/DL (ref 0.1–1)
BUN SERPL-MCNC: 18 MG/DL (ref 8–23)
CALCIUM SERPL-MCNC: 9.9 MG/DL (ref 8.7–10.5)
CHLORIDE SERPL-SCNC: 105 MMOL/L (ref 95–110)
CO2 SERPL-SCNC: 25 MMOL/L (ref 23–29)
CREAT SERPL-MCNC: 0.7 MG/DL (ref 0.5–1.4)
DIFFERENTIAL METHOD BLD: ABNORMAL
EOSINOPHIL # BLD AUTO: 0.2 K/UL (ref 0–0.5)
EOSINOPHIL NFR BLD: 2.8 % (ref 0–8)
ERYTHROCYTE [DISTWIDTH] IN BLOOD BY AUTOMATED COUNT: 13.1 % (ref 11.5–14.5)
EST. GFR  (NO RACE VARIABLE): >60 ML/MIN/1.73 M^2
GLUCOSE SERPL-MCNC: 108 MG/DL (ref 70–110)
HCT VFR BLD AUTO: 47.5 % (ref 37–48.5)
HGB BLD-MCNC: 15.8 G/DL (ref 12–16)
IMM GRANULOCYTES # BLD AUTO: 0.03 K/UL (ref 0–0.04)
IMM GRANULOCYTES NFR BLD AUTO: 0.4 % (ref 0–0.5)
LYMPHOCYTES # BLD AUTO: 1.7 K/UL (ref 1–4.8)
LYMPHOCYTES NFR BLD: 21.1 % (ref 18–48)
MCH RBC QN AUTO: 31.1 PG (ref 27–31)
MCHC RBC AUTO-ENTMCNC: 33.3 G/DL (ref 32–36)
MCV RBC AUTO: 94 FL (ref 82–98)
MONOCYTES # BLD AUTO: 0.4 K/UL (ref 0.3–1)
MONOCYTES NFR BLD: 5.3 % (ref 4–15)
NEUTROPHILS # BLD AUTO: 5.7 K/UL (ref 1.8–7.7)
NEUTROPHILS NFR BLD: 70 % (ref 38–73)
NRBC BLD-RTO: 0 /100 WBC
PLATELET # BLD AUTO: 246 K/UL (ref 150–450)
PMV BLD AUTO: 9.8 FL (ref 9.2–12.9)
POTASSIUM SERPL-SCNC: 3.9 MMOL/L (ref 3.5–5.1)
PROT SERPL-MCNC: 7.1 G/DL (ref 6–8.4)
RBC # BLD AUTO: 5.08 M/UL (ref 4–5.4)
SODIUM SERPL-SCNC: 141 MMOL/L (ref 136–145)
TSH SERPL DL<=0.005 MIU/L-ACNC: 0.58 UIU/ML (ref 0.4–4)
WBC # BLD AUTO: 8.19 K/UL (ref 3.9–12.7)

## 2024-03-13 PROCEDURE — 80053 COMPREHEN METABOLIC PANEL: CPT | Performed by: INTERNAL MEDICINE

## 2024-03-13 PROCEDURE — 36415 COLL VENOUS BLD VENIPUNCTURE: CPT | Performed by: INTERNAL MEDICINE

## 2024-03-13 PROCEDURE — 84443 ASSAY THYROID STIM HORMONE: CPT | Performed by: INTERNAL MEDICINE

## 2024-03-13 PROCEDURE — 85025 COMPLETE CBC W/AUTO DIFF WBC: CPT | Performed by: INTERNAL MEDICINE

## 2024-03-18 ENCOUNTER — HOSPITAL ENCOUNTER (EMERGENCY)
Facility: HOSPITAL | Age: 84
Discharge: HOME | End: 2024-03-19
Attending: EMERGENCY MEDICINE
Payer: MEDICARE

## 2024-03-18 DIAGNOSIS — R29.818 ACUTE FOCAL NEUROLOGICAL DEFICIT: ICD-10-CM

## 2024-03-18 DIAGNOSIS — G43.009 ATYPICAL MIGRAINE: Primary | ICD-10-CM

## 2024-03-18 LAB
ALBUMIN SERPL BCP-MCNC: 4.3 G/DL (ref 3.5–5.2)
ALP SERPL-CCNC: 77 U/L (ref 55–135)
ALT SERPL W/O P-5'-P-CCNC: 22 U/L (ref 10–44)
ANION GAP SERPL CALC-SCNC: 13 MMOL/L (ref 8–16)
AST SERPL-CCNC: 28 U/L (ref 10–40)
BASOPHILS # BLD AUTO: 0.04 K/UL (ref 0–0.2)
BASOPHILS NFR BLD: 0.5 % (ref 0–1.9)
BILIRUB SERPL-MCNC: 0.5 MG/DL (ref 0.1–1)
BILIRUB UR QL STRIP: NEGATIVE
BUN SERPL-MCNC: 13 MG/DL (ref 8–23)
CALCIUM SERPL-MCNC: 10.3 MG/DL (ref 8.7–10.5)
CHLORIDE SERPL-SCNC: 104 MMOL/L (ref 95–110)
CHOLEST SERPL-MCNC: 198 MG/DL (ref 120–199)
CHOLEST/HDLC SERPL: 3.1 {RATIO} (ref 2–5)
CLARITY UR REFRACT.AUTO: CLEAR
CO2 SERPL-SCNC: 25 MMOL/L (ref 23–29)
COLOR UR AUTO: COLORLESS
CREAT SERPL-MCNC: 0.7 MG/DL (ref 0.5–1.4)
DIFFERENTIAL METHOD BLD: ABNORMAL
EOSINOPHIL # BLD AUTO: 0.2 K/UL (ref 0–0.5)
EOSINOPHIL NFR BLD: 2.3 % (ref 0–8)
ERYTHROCYTE [DISTWIDTH] IN BLOOD BY AUTOMATED COUNT: 13.4 % (ref 11.5–14.5)
EST. GFR  (NO RACE VARIABLE): >60 ML/MIN/1.73 M^2
GLUCOSE SERPL-MCNC: 102 MG/DL (ref 70–110)
GLUCOSE UR QL STRIP: NEGATIVE
HCT VFR BLD AUTO: 47.6 % (ref 37–48.5)
HDLC SERPL-MCNC: 64 MG/DL (ref 40–75)
HDLC SERPL: 32.3 % (ref 20–50)
HGB BLD-MCNC: 15.9 G/DL (ref 12–16)
HGB UR QL STRIP: NEGATIVE
IMM GRANULOCYTES # BLD AUTO: 0.02 K/UL (ref 0–0.04)
IMM GRANULOCYTES NFR BLD AUTO: 0.3 % (ref 0–0.5)
INR PPP: 0.9 (ref 0.8–1.2)
KETONES UR QL STRIP: NEGATIVE
LDLC SERPL CALC-MCNC: 111.4 MG/DL (ref 63–159)
LEUKOCYTE ESTERASE UR QL STRIP: NEGATIVE
LYMPHOCYTES # BLD AUTO: 1.6 K/UL (ref 1–4.8)
LYMPHOCYTES NFR BLD: 20.4 % (ref 18–48)
MCH RBC QN AUTO: 31.1 PG (ref 27–31)
MCHC RBC AUTO-ENTMCNC: 33.4 G/DL (ref 32–36)
MCV RBC AUTO: 93 FL (ref 82–98)
MONOCYTES # BLD AUTO: 0.5 K/UL (ref 0.3–1)
MONOCYTES NFR BLD: 5.9 % (ref 4–15)
NEUTROPHILS # BLD AUTO: 5.6 K/UL (ref 1.8–7.7)
NEUTROPHILS NFR BLD: 70.6 % (ref 38–73)
NITRITE UR QL STRIP: NEGATIVE
NONHDLC SERPL-MCNC: 134 MG/DL
NRBC BLD-RTO: 0 /100 WBC
PH UR STRIP: 7 [PH] (ref 5–8)
PLATELET # BLD AUTO: 276 K/UL (ref 150–450)
PMV BLD AUTO: 10.1 FL (ref 9.2–12.9)
POTASSIUM SERPL-SCNC: 4 MMOL/L (ref 3.5–5.1)
PROT SERPL-MCNC: 8 G/DL (ref 6–8.4)
PROT UR QL STRIP: NEGATIVE
PROTHROMBIN TIME: 10.1 SEC (ref 9–12.5)
RBC # BLD AUTO: 5.12 M/UL (ref 4–5.4)
SODIUM SERPL-SCNC: 142 MMOL/L (ref 136–145)
SP GR UR STRIP: 1 (ref 1–1.03)
TRIGL SERPL-MCNC: 113 MG/DL (ref 30–150)
TSH SERPL DL<=0.005 MIU/L-ACNC: 1.06 UIU/ML (ref 0.4–4)
URN SPEC COLLECT METH UR: ABNORMAL
WBC # BLD AUTO: 7.86 K/UL (ref 3.9–12.7)

## 2024-03-18 PROCEDURE — 93010 ELECTROCARDIOGRAM REPORT: CPT | Mod: ,,, | Performed by: INTERNAL MEDICINE

## 2024-03-18 PROCEDURE — 85610 PROTHROMBIN TIME: CPT | Performed by: STUDENT IN AN ORGANIZED HEALTH CARE EDUCATION/TRAINING PROGRAM

## 2024-03-18 PROCEDURE — 80053 COMPREHEN METABOLIC PANEL: CPT | Performed by: STUDENT IN AN ORGANIZED HEALTH CARE EDUCATION/TRAINING PROGRAM

## 2024-03-18 PROCEDURE — 85025 COMPLETE CBC W/AUTO DIFF WBC: CPT | Performed by: STUDENT IN AN ORGANIZED HEALTH CARE EDUCATION/TRAINING PROGRAM

## 2024-03-18 PROCEDURE — 81003 URINALYSIS AUTO W/O SCOPE: CPT | Performed by: EMERGENCY MEDICINE

## 2024-03-18 PROCEDURE — 99285 EMERGENCY DEPT VISIT HI MDM: CPT | Mod: 25

## 2024-03-18 PROCEDURE — 80061 LIPID PANEL: CPT | Performed by: STUDENT IN AN ORGANIZED HEALTH CARE EDUCATION/TRAINING PROGRAM

## 2024-03-18 PROCEDURE — 93005 ELECTROCARDIOGRAM TRACING: CPT

## 2024-03-18 PROCEDURE — 84443 ASSAY THYROID STIM HORMONE: CPT | Performed by: STUDENT IN AN ORGANIZED HEALTH CARE EDUCATION/TRAINING PROGRAM

## 2024-03-18 RX ORDER — DIPHENHYDRAMINE HYDROCHLORIDE 50 MG/ML
12.5 INJECTION INTRAMUSCULAR; INTRAVENOUS
Status: COMPLETED | OUTPATIENT
Start: 2024-03-18 | End: 2024-03-19

## 2024-03-18 RX ORDER — KETOROLAC TROMETHAMINE 30 MG/ML
15 INJECTION, SOLUTION INTRAMUSCULAR; INTRAVENOUS
Status: COMPLETED | OUTPATIENT
Start: 2024-03-18 | End: 2024-03-19

## 2024-03-18 RX ORDER — PROCHLORPERAZINE EDISYLATE 5 MG/ML
10 INJECTION INTRAMUSCULAR; INTRAVENOUS
Status: COMPLETED | OUTPATIENT
Start: 2024-03-18 | End: 2024-03-19

## 2024-03-19 VITALS
RESPIRATION RATE: 17 BRPM | OXYGEN SATURATION: 96 % | DIASTOLIC BLOOD PRESSURE: 83 MMHG | HEART RATE: 85 BPM | BODY MASS INDEX: 35.67 KG/M2 | WEIGHT: 195 LBS | TEMPERATURE: 98 F | SYSTOLIC BLOOD PRESSURE: 155 MMHG

## 2024-03-19 LAB
OHS QRS DURATION: 88 MS
OHS QTC CALCULATION: 453 MS

## 2024-03-19 PROCEDURE — 96374 THER/PROPH/DIAG INJ IV PUSH: CPT

## 2024-03-19 PROCEDURE — 63600175 PHARM REV CODE 636 W HCPCS: Performed by: STUDENT IN AN ORGANIZED HEALTH CARE EDUCATION/TRAINING PROGRAM

## 2024-03-19 PROCEDURE — 96375 TX/PRO/DX INJ NEW DRUG ADDON: CPT

## 2024-03-19 RX ADMIN — PROCHLORPERAZINE EDISYLATE 10 MG: 5 INJECTION INTRAMUSCULAR; INTRAVENOUS at 12:03

## 2024-03-19 RX ADMIN — KETOROLAC TROMETHAMINE 15 MG: 30 INJECTION, SOLUTION INTRAMUSCULAR; INTRAVENOUS at 12:03

## 2024-03-19 RX ADMIN — DIPHENHYDRAMINE HYDROCHLORIDE 12.5 MG: 50 INJECTION, SOLUTION INTRAMUSCULAR; INTRAVENOUS at 12:03

## 2024-03-19 NOTE — ED PROVIDER NOTES
"Chief Complaint   Multiple Complaints (Reports seeing things "running across the floor" since 1900, states she had a migraine yesterday with auras, also reports tingling to bilateral fingers, pt also endorses tightness in head)      History Of Present Illness   Annette Lynch is a 83 y.o. female [facility] of [facility]  Patient states that she sees red snakes that are covering the floor and objects in her vision.  They do not look like actual snakes, but rather abstract snake-like shapes.  She also has tingling in all fingers of both hands.  She has a history of migraines, and notes a migraine currently, but states these hallucinations are completely unlike her usual migraine aura, which is like a kaleidoscope.  Occasionally she has had trouble with her balance in the past, but that has not worsened in the last few hours.  No weakness in her arms or legs.  No changes in speech.        Review of patient's allergies indicates:   Allergen Reactions    Hydrocortisone Swelling    Aspirin      Pt is okay to take 81mg daily    Cortisone     Hydrocodone Nausea And Vomiting       Current Facility-Administered Medications on File Prior to Encounter   Medication Dose Route Frequency Provider Last Rate Last Admin    tranexamic acid injection Soln 1,000 mg  1,000 mg Intravenous On Call Procedure Gus rGoves MD         Current Outpatient Medications on File Prior to Encounter   Medication Sig Dispense Refill    acetaminophen (TYLENOL) 500 MG tablet Take 500 mg by mouth every 6 (six) hours as needed for Pain.      ascorbic acid, vitamin C, (VITAMIN C) 1000 MG tablet Take 1,000 mg by mouth once daily.      aspirin (ECOTRIN) 81 MG EC tablet Take 81 mg by mouth once daily.      cetirizine (ZYRTEC) 10 MG tablet Take 10 mg by mouth once daily.      cloNIDine (CATAPRES) 0.1 MG tablet Take 1 tablet (0.1 mg total) by mouth 2 (two) times daily. (Patient not taking: Reported " on 1/12/2024) 60 tablet 11    co-enzyme Q-10 30 mg capsule Take 30 mg by mouth 3 (three) times daily.      DYMISTA 137-50 mcg/spray [place] nassal spray SPRAY ONCE IEN BID 1 each 1    famotidine (PEPCID) 10 MG tablet Take 10 mg by mouth 2 (two) times daily.      hydrocortisone 2.5 % cream APPLY TO THE AFFECTED AREA ON CHEST TWICE DAILY      magnesium 200 mg Tab Take 400 mg by mouth once.      meloxicam (MOBIC) 15 MG tablet Take 15 mg by mouth daily as needed.      multivitamin (THERAGRAN) per tablet Take 1 tablet by mouth once daily.      omega-3 fatty acids 1,000 mg Cap Take by mouth.      ondansetron (ZOFRAN) 8 MG tablet Take 1 tablet (8 mg total) by mouth every 8 (eight) hours as needed for Nausea. (Patient not taking: Reported on 1/12/2024) 20 tablet 1    ondansetron (ZOFRAN-ODT) 4 MG TbDL Take 2 tablets (8 mg total) by mouth every 8 (eight) hours as needed (nausea). (Patient not taking: Reported on 1/12/2024) 20 tablet 1    pantoprazole (PROTONIX) 40 MG tablet TAKE 1 TABLET(40 MG) BY MOUTH EVERY DAY 90 tablet 1    sucralfate (CARAFATE) 1 gram tablet Take 1 tablet (1 g total) by mouth 4 (four) times daily as needed. 45 tablet 2    vitamin D (VITAMIN D3) 1000 units Tab Take 1,000 Units by mouth once daily.         Past History   As per HPI and below:  Past Medical History:   Diagnosis Date    Allergy     Essential hypertension 08/31/2020    does not take any medication - Cardiologist is rubén    Hiatal hernia     Migraine     Stomach ulcer     TB (pulmonary tuberculosis)      Past Surgical History:   Procedure Laterality Date    ARTHROSCOPIC TENOTOMY OF BICEPS TENDON Left 4/27/2023    Procedure: TENOTOMY, BICEPS, ARTHROSCOPIC;  Surgeon: Gus Groves MD;  Location: [facility] OR;  Service: Orthopedics;  Laterality: Left;    DECOMPRESSION OF SUBACROMIAL SPACE Left 4/27/2023    Procedure: DECOMPRESSION, SUBACROMIAL SPACE;  Surgeon: Gus Groves MD;  Location: [facility] OR;  Service: Orthopedics;  Laterality: Left;     EXAMINATION UNDER ANESTHESIA Left 4/27/2023    Procedure: EXAM UNDER ANESTHESIA;  Surgeon: Gus Groves MD;  Location: [facility];  Service: Orthopedics;  Laterality: Left;    EXCISION OF BURSA Left 4/27/2023    Procedure: BURSECTOMY;  Surgeon: Gus Groves MD;  Location: [facility] OR;  Service: Orthopedics;  Laterality: Left;    ROTATOR CUFF REPAIR Left 4/27/2023    Procedure: REPAIR, ROTATOR CUFF;  Surgeon: Gus Groves MD;  Location: [facility] OR;  Service: Orthopedics;  Laterality: Left;    SHOULDER ARTHROSCOPY Left 4/27/2023    Procedure: ARTHROSCOPY, SHOULDER;  Surgeon: Gus Groves MD;  Location: [facility];  Service: Orthopedics;  Laterality: Left;    THORACENTESIS         Social History     Tobacco Use    Smoking status: Never    Smokeless tobacco: Never   Substance Use Topics    Alcohol use: Yes     Comment: socially    Drug use: Never       Family History   Family history unknown: Yes       Physical Exam     Vitals:    03/18/24 2330 03/19/24 0100 03/19/24 0130 03/19/24 0303   BP: (!) 158/70 (!) 165/82 (!) 155/80 (!) 155/83   BP Location: Right arm Right arm Right arm Right arm   Patient Position: Lying Lying Lying Lying   Pulse: 82 75 83 85   Resp: 17 16 16 17   Temp: 98 °F (36.7 °C) 98 °F (36.7 °C) 98 °F (36.7 °C) 98.1 °F (36.7 °C)   TempSrc: Oral Oral Oral Oral   SpO2: 95% 95% 96% 96%   Weight:         Appearance: No acute distress.  Skin: No rashes seen.  Good turgor.  No abrasions.  No ecchymoses.  Eyes: No conjunctival injection.  ENT: Oropharynx clear.    Chest: Clear to auscultation bilaterally.  Good air movement.  No wheezes.  No rhonchi.  Cardiovascular: Regular rate and rhythm.  No murmurs. No gallops. No rubs.  Abdomen: Soft.  Not distended.  Nontender.  No guarding.  No rebound.  Musculoskeletal: Good range of motion all joints.  No deformities.  Neck supple.  No meningismus.  Neurologic: Motor intact.  Sensation intact.  Cerebellar intact.  Cranial nerves intact.  Mental Status:  Alert and  oriented x 3.  Appropriate, conversant.        Initial MDM   Hallucinations along with bilateral hand tingling, no lateralizing symptoms suggestive of anterior stroke.  I am concerned about the possibility of posterior stroke, although her NIH stroke scale is basically 0.  I am going to obtain MRI.  No vertigo or dizziness.  Differential also includes mass, migraine, less likely encephalopathy.  Doubt meningitis.  Will obtain stroke workup for labs.      Medications Given     Medications   ketorolac injection 15 mg (15 mg Intravenous Given 3/19/24 0035)   prochlorperazine injection Soln 10 mg (10 mg Intravenous Given 3/19/24 0035)   diphenhydrAMINE injection 12.5 mg (12.5 mg Intravenous Given 3/19/24 0036)       Results and Course     Labs Reviewed   CBC W/ AUTO DIFFERENTIAL - Abnormal; Notable for the following components:       Result Value    MCH 31.1 (*)     All other components within normal limits   URINALYSIS, REFLEX TO URINE CULTURE - Abnormal; Notable for the following components:    Color, UA Colorless (*)     All other components within normal limits    Narrative:     Specimen Source->Urine   COMPREHENSIVE METABOLIC PANEL   PROTIME-INR   TSH   LIPID PANEL       Imaging Results              MRI Brain Without Contrast (Final result)  Result time 03/18/24 23:32:57      Final result by Noah Kumar MD (03/18/24 23:32:57)                   Impression:      No evidence of acute intracranial pathology.  Specifically, no evidence of acute infarction.    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    Extensive old lacunar type infarctions in the cerebellum.    Electronically signed by resident: Gus Abarca  Date:    03/18/2024  Time:    22:58    Electronically signed by: Noah Kumar MD  Date:    03/18/2024  Time:    23:32               Narrative:    EXAMINATION:  MRI BRAIN WITHOUT CONTRAST    CLINICAL HISTORY:  Stroke, follow up;    TECHNIQUE:  Multiplanar  multisequence MR imaging of the brain was performed without intravenous contrast.    COMPARISON:  CT 03/18/2024.    FINDINGS:  Intracranial Compartment:    The craniocervical junction is intact.  The sellar and parasellar structures are within normal limits. The ventricles are stable.  No hydrocephalus.    Brain appears unchanged.  No evidence of acute infarction.  No acute intracranial hemorrhage.  Generalized cerebral volume loss with focal area of encephalomalacia within the right frontal lobe likely from prior ischemic or traumatic injury.  Superimposed moderate chronic microvascular ischemic change in the supratentorial white matter.  There are extensive old lacunar type infarcts in the cerebellum.    No extra-axial blood or fluid collections.    Normal vascular flow voids are preserved.    Skull/Extracranial Contents (limited evaluation):    Bone marrow signal intensity is normal.  Possible small mucosal retention cyst in the floor of the left maxillary sinus.                                       CT Head Without Contrast (Final result)  Result time 03/18/24 22:24:29      Final result by Noah Kumar MD (03/18/24 22:24:29)                   Impression:      No evidence of acute intracranial pathology.  Additional evaluation as clinically warranted.    Generalized cerebral volume loss, chronic microvascular ischemic change, and focal right frontal lobe encephalomalacia likely from prior ischemic or traumatic injury.    Electronically signed by resident: Gus Abarca  Date:    03/18/2024  Time:    22:02    Electronically signed by: Noah Kumar MD  Date:    03/18/2024  Time:    22:24               Narrative:    EXAMINATION:  CT HEAD WITHOUT CONTRAST    CLINICAL HISTORY:  Neuro deficit, acute, stroke suspected;    TECHNIQUE:  Low dose axial CT images obtained throughout the head without the use of intravenous contrast.  Axial, sagittal and coronal reconstructions were performed.    COMPARISON:  CT head  08/04/2021.    FINDINGS:  Intracranial compartment:    Ventricles are stable.  No hydrocephalus.  Cavum septum pellucidum et vergae.    Brain appears similar.  Generalized cerebral volume loss with focal encephalomalacia in the right frontal lobe.  Suspected chronic lacunar type infarct within the left cerebellar hemisphere.  Moderate confluent periventricular white matter hypoattenuation, likely chronic microvascular ischemic change.  No acute major territorial infarction or intraparenchymal hemorrhage.    No extra-axial blood or fluid collections.    Skull/extracranial contents (limited evaluation):    No fracture. Mastoid air cells and paranasal sinuses are essentially clear.                                      ED Course as of 03/19/24 1043   Mon Mar 18, 2024   2154 CT Head Without Contrast  No ICH per my independent interpretation.  Old right frontal CVA.     [DC]      ED Course User Index  [DC] Jose Elias Madison MD               MDM, Impression and Plan   83 y.o. female with hallucinations, occasional balance issues.  Turned over to Dr. Mock pending CT, MRI results.           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 Check in desk is located in the lobby. Please take the C elevator to 8th floor which opens to the lobby.               Jose Elias Madison MD  03/19/24 1044

## 2024-03-19 NOTE — ED TRIAGE NOTES
"Annette Lynch, a 83 y.o. female presents to the ED w/ complaint of seeing things "running across the floor" since 1900, states she had a migraine yesterday with auras, also reports tingling to bilateral fingers, pt also endorses tightness in head. Pt says she sees "red snakes".    Triage note:  Chief Complaint   Patient presents with    Multiple Complaints     Reports seeing things "running across the floor" since 1900, states she had a migraine yesterday with auras, also reports tingling to bilateral fingers, pt also endorses tightness in head     Review of patient's allergies indicates:   Allergen Reactions    Hydrocortisone Swelling    Aspirin      Pt is okay to take 81mg daily    Cortisone     Hydrocodone Nausea And Vomiting     Past Medical History:   Diagnosis Date    Allergy     Essential hypertension 08/31/2020    does not take any medication - Cardiologist is okay    Hiatal hernia     Migraine     Stomach ulcer     TB (pulmonary tuberculosis)    Patient identifiers for Annette Lynch checked and correct.    LOC: The patient is awake, alert and aware of environment with an appropriate affect, the patient is oriented x 4 and speaking appropriately.    APPEARANCE: Patient resting comfortably and in no acute distress, patient is clean and well groomed, patient's clothing is properly fastened.    SKIN: The skin is warm and dry, color consistent with ethnicity, patient has normal skin turgor and moist mucus membranes, skin intact, no breakdown or bruising noted.    MUSCULOSKELETAL: Patient moving all extremities well, no obvious swelling or deformities noted.    RESPIRATORY: Airway is open and patent, respirations are spontaneous and even, patient has a normal effort and rate.    CARDIAC: Patient has a normal rate and rhythm, no periphreal edema noted, capillary refill < 3 seconds.    ABDOMEN: Soft and non tender to palpation, no distention noted. Patient denies any nausea, vomiting, diarrhea, or constipation. " "    NEUROLOGIC: Eyes open spontaneously, PERRL, behavior appropriate to situation, follows commands, facial expression symmetrical, bilateral hand grasp equal and even, purposeful motor response noted, normal sensation in all extremities. Pt reports seeing "red snakes".    HEENT: No abnormalities noted. White sclera and pupils equal round and reactive to light. Denies headache, dizziness.     : Pt voids independently, denies dysuria, hematuria, frequency.    "

## 2024-03-19 NOTE — FIRST PROVIDER EVALUATION
Medical screening examination initiated.  I have conducted a focused provider triage encounter, findings are as follows:    Brief history of present illness:  83F with hx of migraines with auras here with visual hallucinations not typical of her other auras but different enough to produce concern. Saw red streaks both eyes while looking at television. Has been feeling off for a few days, unsteady gait. Tightness across head. Had migraine with aura yesterday. This is different.    Vitals:    03/18/24 2022   BP: (!) 204/98   Pulse: 91   Resp: 18   Temp: 97.3 °F (36.3 °C)   TempSrc: Oral   SpO2: 95%   Weight: 88.5 kg (195 lb)       Pertinent physical exam:  no focal neuro deficits, no disdiadochokinesia, speech is normal, no asymmetry of face     Brief workup plan:  no stroke alert at this time given off/on nature of sx progressing for a few days (visual disturbances not present now) but sx highly concerning thus made level 2 for ED bed and notified patient and her  to let me know if sx changes, will need imaging.    Preliminary workup initiated; this workup will be continued and followed by the physician or advanced practice provider that is assigned to the patient when roomed.

## 2024-03-19 NOTE — DISCHARGE INSTRUCTIONS
You were seen in the ER today for your changes in vision, as well as headache, this may be due to an atypical migraine, there were no new findings seen on your MRI or CT head, however you were found to have old cerebellar small strokes, as well as old deterioration in your frontal lobe of the brain.  Please follow up with Neurology, and return to the ER immediately if you have any numbness weakness, difficulty speaking, or any new changes in your neurologic function, as well as severe headache or neck pain.      Thank you for coming to our Emergency Department today. It is important to remember that some problems or medical conditions are difficult to diagnose and may not be found or addressed during your Emergency Department visit.  These conditions often start with non-specific symptoms and can only be diagnosed on follow up visits with your primary care physician or specialist when the symptoms continue or change. Please remember that all medical conditions can change, and we cannot predict how you will be feeling tomorrow or the next day. Return to the ER with any questions/concerns, new/concerning symptoms, worsening or failure to improve.       Be sure to follow up with your primary care doctor and review all labs/imaging/tests that were performed during your ER visit with them. It is very common for us to identify non-emergent incidental findings which must be followed up with your primary care physician.  Some labs/imaging/tests may be outside of the normal range, and require non-emergent follow-up and/or further investigation/treatment/procedures/testing to help diagnose/exclude/prevent complications or other potentially serious medical conditions. Some abnormalities may not have been discussed or addressed during your ER visit. Some lab results may not return during your ER visit but can be accessible by downloading the free [facility] Mychart jarett or by visiting [email or web address] . It is important for  you to review all labs/imaging/tests which are outside of the normal range with your physician.    An ER visit does not replace a primary care visit, and many screening tests or follow-up tests cannot be ordered by an ER doctor or performed by the ER. Some tests may even require pre-approval.    If you do not have a primary care doctor, you may contact the one listed on your discharge paperwork or you may also call the [facility] Appointment Desk at [phone] , or [address] [place] [address] at  [phone] to schedule an appointment, or establish care with a primary care doctor or even a specialist and to obtain information about local resources. It is important to your health that you have a primary care doctor.    Please take all medications as directed. We have done our best to select a medication for you that will treat your condition however, all medications may potentially have side-effects and it is impossible to predict which medications may give you side-effects or what those side-effects (if any) those medications may give you.  If you feel that you are having a negative effect or side-effect of any medication you should stop taking those medications immediately and seek medical attention. If you feel that you are having a life-threatening reaction call 911.        Do not drive, swim, climb to height, take a bath, operate heavy machinery, drink alcohol or take potentially sedating medications, sign any legal documents or make any important decisions for 24 hours if you have received any pain medications, sedatives or mood altering drugs during your ER visit or within 24 hours of taking them if they have been prescribed to you.     You can find additional resources for Dentists, hearing aids, durable medical equipment, low cost pharmacies and other resources at [email or web address]

## 2024-03-20 ENCOUNTER — TELEPHONE (OUTPATIENT)
Dept: NEUROLOGY | Facility: CLINIC | Age: 84
End: 2024-03-20
Payer: MEDICARE

## 2024-03-20 NOTE — TELEPHONE ENCOUNTER
Spoke to Pt about scheduling a follow up appt. Pt will be scheduled for March 25. Pt was informed of the appt location, Pt was advised to arrive early, and informed about the 15 min keiry period.     ----- Message from Shelbie Robles RN sent at 3/20/2024  4:34 PM CDT -----  Regarding: atypical migraine  Can Marjorie get her in as EP ASAP please?  Got message from her PCP/ .   Looks like she is scheduled with Dr PINON in June but had recent ED visit    Thanks,    Shelbie

## 2024-03-25 ENCOUNTER — PATIENT MESSAGE (OUTPATIENT)
Dept: NEUROLOGY | Facility: CLINIC | Age: 84
End: 2024-03-25

## 2024-03-25 ENCOUNTER — OFFICE VISIT (OUTPATIENT)
Dept: NEUROLOGY | Facility: CLINIC | Age: 84
End: 2024-03-25
Payer: MEDICARE

## 2024-03-25 VITALS
HEART RATE: 75 BPM | DIASTOLIC BLOOD PRESSURE: 76 MMHG | SYSTOLIC BLOOD PRESSURE: 184 MMHG | BODY MASS INDEX: 35.81 KG/M2 | WEIGHT: 195.75 LBS

## 2024-03-25 DIAGNOSIS — G43.009 ATYPICAL MIGRAINE: ICD-10-CM

## 2024-03-25 DIAGNOSIS — G43.109 MIGRAINE WITH AURA AND WITHOUT STATUS MIGRAINOSUS, NOT INTRACTABLE: Primary | ICD-10-CM

## 2024-03-25 DIAGNOSIS — I10 ESSENTIAL HYPERTENSION: ICD-10-CM

## 2024-03-25 DIAGNOSIS — R29.818 ACUTE FOCAL NEUROLOGICAL DEFICIT: ICD-10-CM

## 2024-03-25 DIAGNOSIS — G43.509 PERSISTENT MIGRAINE AURA WITHOUT CEREBRAL INFARCTION AND WITHOUT STATUS MIGRAINOSUS, NOT INTRACTABLE: ICD-10-CM

## 2024-03-25 PROCEDURE — 99215 OFFICE O/P EST HI 40 MIN: CPT | Mod: S$PBB,,, | Performed by: NURSE PRACTITIONER

## 2024-03-25 PROCEDURE — G2211 COMPLEX E/M VISIT ADD ON: HCPCS | Mod: S$PBB,,, | Performed by: NURSE PRACTITIONER

## 2024-03-25 PROCEDURE — 99999 PR PBB SHADOW E&M-EST. PATIENT-LVL IV: CPT | Mod: PBBFAC,,, | Performed by: NURSE PRACTITIONER

## 2024-03-25 PROCEDURE — 99214 OFFICE O/P EST MOD 30 MIN: CPT | Mod: PBBFAC | Performed by: NURSE PRACTITIONER

## 2024-03-25 NOTE — PROGRESS NOTES
"Established Patient   SUBJECTIVE:  Patient ID: Annette Lynch   Chief Complaint: Follow-up    History of Present Illness:  Annette Lynch is a 83 y.o. female who presents to clinic with her  for follow-up of headaches.     Recommendations made at last Office Visit on 4/13/22:  - Continue tracking migraines, advised if frequency > 4 per month would recommend starting daily therapy for migraine prevention   - For acute migraine - OTC tylenol as needed   - Would recommend limiting use of tylenol to no more than 10 days per month, no more than 3000 mg/day    - Continue tracking headaches   - HTN - BP elevated in clinic today, continue monitoring blood pressure regularly at home, keep f/up appts with primary care and cardiology for further management   - RTC as needed      03/25/2024 - Interval History:  Migraines had been relatively well controlled until about a week ago, was eating dinner and began to see a pattern on her fish, would see pattern on her drapery as well.  A few hours later, while watching television, began to see "red or yellow snakes" across her vision.  Had tightness across her forehead and nausea but did not feel symptoms were migraine related. Presented to ED as visual hallucinations were concerning.  Thorough work-up completed including MRI Brain, CT Head, ECG, CMP, LDL, TSH, CBC, PT-INR, and U/A.  Ct Head negative.  MR Brain showed "no evidence of acute intracranial pathology.  Specifically, no evidence of acute infarction.  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  Extensive old lacunar type infarctions in the cerebellum".  She was treated with IV toradol, compazine and benadryl and discharged home.  Reports symptoms persisted for three days following.  No significant changes around the time her symptoms began, though she has been taking carafate more frequently, which had been identified as a migraine trigger for her in years past.  She has " "also been taking melatonin and THC peppermints to help with sleep.   Of note, BP elevated in clinic today, 184/76 mmHg.  Monitors BP regularly at home, readings typically 130's mmHg systolic and 80-90'smmHg diastolic.     Otherwise, information below is still accurate and current.     04/13/2022 - Interval History:  Symptoms determined to be related to sucralfate which has since been discontinued and symptoms have resolved.  Had a migraine two days ago following tense conversation with daughter, was resolved after taking tylenol and drinking a cup of coffee.  She has been tracking her migraines, last migraine tracked prior to two days ago was 3/5/22.   reports she suffers with more frequent headaches than she tracks.   is concerned she is taking tylenol too frequently.  Patient states she typically takes tylenol every other day or so.    Blood pressure elevated in clinic today, 154/89 mmHg.  Pt states she has been monitoring her blood pressure regularly at home and readings are typically much lower.  She is no longer taking lisinopril nor valsartan and requests medications be removed from med list.      Otherwise, information below is still accurate and current.      History of Present Illness:   81 y.o. female with migraines, anxiety, HTN, depression, who presents to clinic with her  for evaluation of headaches.  History is reported by both the patient and her .  Migraines began in childhood, initially diagnosed with migraine headaches after having her children.  Migraines typically preceded by visual aura, describes seeing a "kaleidoscope".  Recently has been suffering with more frequent migraines, visual aura.  Typical triggers include certain foods, "stress", "emotions", being sick.  Currently suffering with a migraine anywhere from once per week up to every other day, depending on triggers.  Visual aura typically lasts less than an hour, will dissipate while she drinks coffee, she " typically takes 2 tabs extra strength tylenol, migraine headache may or may not begin.  Associated symptoms include nausea, photophobia, phonophobia.    During October, she began experiencing episodes of shaking, dizziness, lightheadedness, headaches, elevated blood pressure.  Has not had recurrent episode since October.     Current Medications:    acetaminophen (TYLENOL) 500 MG tablet, Take 500 mg by mouth every 6 (six) hours as needed for Pain., Disp: , Rfl:     ascorbic acid, vitamin C, (VITAMIN C) 1000 MG tablet, Take 1,000 mg by mouth once daily., Disp: , Rfl:     aspirin (ECOTRIN) 81 MG EC tablet, Take 81 mg by mouth once daily., Disp: , Rfl:     cetirizine (ZYRTEC) 10 MG tablet, Take 10 mg by mouth once daily., Disp: , Rfl:     cloNIDine (CATAPRES) 0.1 MG tablet, Take 1 tablet (0.1 mg total) by mouth 2 (two) times daily., Disp: 60 tablet, Rfl: 11    co-enzyme Q-10 30 mg capsule, Take 30 mg by mouth 3 (three) times daily., Disp: , Rfl:     DYMISTA 137-50 mcg/spray [place] nassal spray, SPRAY ONCE IEN BID, Disp: 1 each, Rfl: 1    famotidine (PEPCID) 10 MG tablet, Take 10 mg by mouth 2 (two) times daily., Disp: , Rfl:     hydrocortisone 2.5 % cream, APPLY TO THE AFFECTED AREA ON CHEST TWICE DAILY, Disp: , Rfl:     magnesium 200 mg Tab, Take 400 mg by mouth once., Disp: , Rfl:     meloxicam (MOBIC) 15 MG tablet, Take 15 mg by mouth daily as needed., Disp: , Rfl:     multivitamin (THERAGRAN) per tablet, Take 1 tablet by mouth once daily., Disp: , Rfl:     omega-3 fatty acids 1,000 mg Cap, Take by mouth., Disp: , Rfl:     ondansetron (ZOFRAN) 8 MG tablet, Take 1 tablet (8 mg total) by mouth every 8 (eight) hours as needed for Nausea., Disp: 20 tablet, Rfl: 1    ondansetron (ZOFRAN-ODT) 4 MG TbDL, Take 2 tablets (8 mg total) by mouth every 8 (eight) hours as needed (nausea)., Disp: 20 tablet, Rfl: 1    pantoprazole (PROTONIX) 40 MG tablet, TAKE 1 TABLET(40 MG) BY MOUTH EVERY DAY, Disp: 90 tablet, Rfl: 1    sucralfate  (CARAFATE) 1 gram tablet, Take 1 tablet (1 g total) by mouth 4 (four) times daily as needed., Disp: 45 tablet, Rfl: 2    vitamin D (VITAMIN D3) 1000 units Tab, Take 1,000 Units by mouth once daily., Disp: , Rfl:   No current facility-administered medications for this visit.    Facility-Administered Medications Ordered in Other Visits:     tranexamic acid injection Soln 1,000 mg, 1,000 mg, Intravenous, On Call Procedure, Gus Groves MD    Review of Systems - A review of 10+ systems was conducted with pertinent positive and negative findings documented in HPI with all other systems reviewed and negative.    PFSH: Past medical, family, and social history reviewed as documented in chart with pertinent positive medical, family, and social history detailed in HPI.    OBJECTIVE:  Vitals:  BP (!) 184/76   Pulse 75   Wt 88.8 kg (195 lb 12.3 oz)   BMI 35.81 kg/m²      Physical Exam:  Constitutional: she appears well-developed and well-nourished. she is well groomed. NAD    Review of Data:   Notes from ED, internal medicine reviewed   Labs:  Admission on 03/18/2024, Discharged on 03/19/2024   Component Date Value Ref Range Status    WBC 03/18/2024 7.86  3.90 - 12.70 K/uL Final    RBC 03/18/2024 5.12  4.00 - 5.40 M/uL Final    Hemoglobin 03/18/2024 15.9  12.0 - 16.0 g/dL Final    Hematocrit 03/18/2024 47.6  37.0 - 48.5 % Final    MCV 03/18/2024 93  82 - 98 fL Final    MCH 03/18/2024 31.1 (H)  27.0 - 31.0 pg Final    MCHC 03/18/2024 33.4  32.0 - 36.0 g/dL Final    RDW 03/18/2024 13.4  11.5 - 14.5 % Final    Platelets 03/18/2024 276  150 - 450 K/uL Final    MPV 03/18/2024 10.1  9.2 - 12.9 fL Final    Immature Granulocytes 03/18/2024 0.3  0.0 - 0.5 % Final    Gran # (ANC) 03/18/2024 5.6  1.8 - 7.7 K/uL Final    Immature Grans (Abs) 03/18/2024 0.02  0.00 - 0.04 K/uL Final    Lymph # 03/18/2024 1.6  1.0 - 4.8 K/uL Final    Mono # 03/18/2024 0.5  0.3 - 1.0 K/uL Final    Eos # 03/18/2024 0.2  0.0 - 0.5 K/uL Final    Baso #  03/18/2024 0.04  0.00 - 0.20 K/uL Final    nRBC 03/18/2024 0  0 /100 WBC Final    Gran % 03/18/2024 70.6  38.0 - 73.0 % Final    Lymph % 03/18/2024 20.4  18.0 - 48.0 % Final    Mono % 03/18/2024 5.9  4.0 - 15.0 % Final    Eosinophil % 03/18/2024 2.3  0.0 - 8.0 % Final    Basophil % 03/18/2024 0.5  0.0 - 1.9 % Final    Differential Method 03/18/2024 Automated   Final    Sodium 03/18/2024 142  136 - 145 mmol/L Final    Potassium 03/18/2024 4.0  3.5 - 5.1 mmol/L Final    Chloride 03/18/2024 104  95 - 110 mmol/L Final    CO2 03/18/2024 25  23 - 29 mmol/L Final    Glucose 03/18/2024 102  70 - 110 mg/dL Final    BUN 03/18/2024 13  8 - 23 mg/dL Final    Creatinine 03/18/2024 0.7  0.5 - 1.4 mg/dL Final    Calcium 03/18/2024 10.3  8.7 - 10.5 mg/dL Final    Total Protein 03/18/2024 8.0  6.0 - 8.4 g/dL Final    Albumin 03/18/2024 4.3  3.5 - 5.2 g/dL Final    Total Bilirubin 03/18/2024 0.5  0.1 - 1.0 mg/dL Final    Alkaline Phosphatase 03/18/2024 77  55 - 135 U/L Final    AST 03/18/2024 28  10 - 40 U/L Final    ALT 03/18/2024 22  10 - 44 U/L Final    eGFR 03/18/2024 >60.0  >60 mL/min/1.73 m^2 Final    Anion Gap 03/18/2024 13  8 - 16 mmol/L Final    Prothrombin Time 03/18/2024 10.1  9.0 - 12.5 sec Final    INR 03/18/2024 0.9  0.8 - 1.2 Final    TSH 03/18/2024 1.062  0.400 - 4.000 uIU/mL Final    QRS Duration 03/18/2024 88  ms Final    [facility] QTC Calculation 03/18/2024 453  ms Final    Cholesterol 03/18/2024 198  120 - 199 mg/dL Final    Triglycerides 03/18/2024 113  30 - 150 mg/dL Final    HDL 03/18/2024 64  40 - 75 mg/dL Final    LDL Cholesterol 03/18/2024 111.4  63.0 - 159.0 mg/dL Final    HDL/Cholesterol Ratio 03/18/2024 32.3  20.0 - 50.0 % Final    Total Cholesterol/HDL Ratio 03/18/2024 3.1  2.0 - 5.0 Final    Non-HDL Cholesterol 03/18/2024 134  mg/dL Final    Specimen UA 03/18/2024 Urine, Clean Catch   Final    Color, UA 03/18/2024 Colorless (A)  Yellow, Straw, Radha Final    Appearance, UA 03/18/2024 Clear  Clear Final    pH,  UA 03/18/2024 7.0  5.0 - 8.0 Final    Specific Gravity, UA 03/18/2024 1.005  1.005 - 1.030 Final    Protein, UA 03/18/2024 Negative  Negative Final    Glucose, UA 03/18/2024 Negative  Negative Final    Ketones, UA 03/18/2024 Negative  Negative Final    Bilirubin (UA) 03/18/2024 Negative  Negative Final    Occult Blood UA 03/18/2024 Negative  Negative Final    Nitrite, UA 03/18/2024 Negative  Negative Final    Leukocytes, UA 03/18/2024 Negative  Negative Final   Lab Visit on 03/13/2024   Component Date Value Ref Range Status    WBC 03/13/2024 8.19  3.90 - 12.70 K/uL Final    RBC 03/13/2024 5.08  4.00 - 5.40 M/uL Final    Hemoglobin 03/13/2024 15.8  12.0 - 16.0 g/dL Final    Hematocrit 03/13/2024 47.5  37.0 - 48.5 % Final    MCV 03/13/2024 94  82 - 98 fL Final    MCH 03/13/2024 31.1 (H)  27.0 - 31.0 pg Final    MCHC 03/13/2024 33.3  32.0 - 36.0 g/dL Final    RDW 03/13/2024 13.1  11.5 - 14.5 % Final    Platelets 03/13/2024 246  150 - 450 K/uL Final    MPV 03/13/2024 9.8  9.2 - 12.9 fL Final    Immature Granulocytes 03/13/2024 0.4  0.0 - 0.5 % Final    Gran # (ANC) 03/13/2024 5.7  1.8 - 7.7 K/uL Final    Immature Grans (Abs) 03/13/2024 0.03  0.00 - 0.04 K/uL Final    Lymph # 03/13/2024 1.7  1.0 - 4.8 K/uL Final    Mono # 03/13/2024 0.4  0.3 - 1.0 K/uL Final    Eos # 03/13/2024 0.2  0.0 - 0.5 K/uL Final    Baso # 03/13/2024 0.03  0.00 - 0.20 K/uL Final    nRBC 03/13/2024 0  0 /100 WBC Final    Gran % 03/13/2024 70.0  38.0 - 73.0 % Final    Lymph % 03/13/2024 21.1  18.0 - 48.0 % Final    Mono % 03/13/2024 5.3  4.0 - 15.0 % Final    Eosinophil % 03/13/2024 2.8  0.0 - 8.0 % Final    Basophil % 03/13/2024 0.4  0.0 - 1.9 % Final    Differential Method 03/13/2024 Automated   Final    Sodium 03/13/2024 141  136 - 145 mmol/L Final    Potassium 03/13/2024 3.9  3.5 - 5.1 mmol/L Final    Chloride 03/13/2024 105  95 - 110 mmol/L Final    CO2 03/13/2024 25  23 - 29 mmol/L Final    Glucose 03/13/2024 108  70 - 110 mg/dL Final    BUN  03/13/2024 18  8 - 23 mg/dL Final    Creatinine 03/13/2024 0.7  0.5 - 1.4 mg/dL Final    Calcium 03/13/2024 9.9  8.7 - 10.5 mg/dL Final    Total Protein 03/13/2024 7.1  6.0 - 8.4 g/dL Final    Albumin 03/13/2024 4.2  3.5 - 5.2 g/dL Final    Total Bilirubin 03/13/2024 0.6  0.1 - 1.0 mg/dL Final    Alkaline Phosphatase 03/13/2024 71  55 - 135 U/L Final    AST 03/13/2024 21  10 - 40 U/L Final    ALT 03/13/2024 22  10 - 44 U/L Final    eGFR 03/13/2024 >60  >60 mL/min/1.73 m^2 Final    Anion Gap 03/13/2024 11  8 - 16 mmol/L Final    TSH 03/13/2024 0.585  0.400 - 4.000 uIU/mL Final   Lab Visit on 10/17/2023   Component Date Value Ref Range Status    Sodium 10/17/2023 140  136 - 145 mmol/L Final    Potassium 10/17/2023 4.1  3.5 - 5.1 mmol/L Final    Chloride 10/17/2023 106  95 - 110 mmol/L Final    CO2 10/17/2023 24  23 - 29 mmol/L Final    Glucose 10/17/2023 93  70 - 110 mg/dL Final    BUN 10/17/2023 17  8 - 23 mg/dL Final    Creatinine 10/17/2023 0.8  0.5 - 1.4 mg/dL Final    Calcium 10/17/2023 9.8  8.7 - 10.5 mg/dL Final    Total Protein 10/17/2023 7.2  6.0 - 8.4 g/dL Final    Albumin 10/17/2023 3.9  3.5 - 5.2 g/dL Final    Total Bilirubin 10/17/2023 0.7  0.1 - 1.0 mg/dL Final    Alkaline Phosphatase 10/17/2023 70  55 - 135 U/L Final    AST 10/17/2023 22  10 - 40 U/L Final    ALT 10/17/2023 18  10 - 44 U/L Final    eGFR 10/17/2023 >60.0  >60 mL/min/1.73 m^2 Final    Anion Gap 10/17/2023 10  8 - 16 mmol/L Final    Cholesterol 10/17/2023 174  120 - 199 mg/dL Final    Triglycerides 10/17/2023 77  30 - 150 mg/dL Final    HDL 10/17/2023 66  40 - 75 mg/dL Final    LDL Cholesterol 10/17/2023 92.6  63.0 - 159.0 mg/dL Final    HDL/Cholesterol Ratio 10/17/2023 37.9  20.0 - 50.0 % Final    Total Cholesterol/HDL Ratio 10/17/2023 2.6  2.0 - 5.0 Final    Non-HDL Cholesterol 10/17/2023 108  mg/dL Final    TSH 10/17/2023 1.177  0.400 - 4.000 uIU/mL Final     Imaging:  Results for orders placed or performed during the hospital encounter  of 03/18/24   MRI Brain Without Contrast    Narrative    EXAMINATION:  MRI BRAIN WITHOUT CONTRAST    CLINICAL HISTORY:  Stroke, follow up;    TECHNIQUE:  Multiplanar multisequence MR imaging of the brain was performed without intravenous contrast.    COMPARISON:  CT 03/18/2024.    FINDINGS:  Intracranial Compartment:    The craniocervical junction is intact.  The sellar and parasellar structures are within normal limits. The ventricles are stable.  No hydrocephalus.    Brain appears unchanged.  No evidence of acute infarction.  No acute intracranial hemorrhage.  Generalized cerebral volume loss with focal area of encephalomalacia within the right frontal lobe likely from prior ischemic or traumatic injury.  Superimposed moderate chronic microvascular ischemic change in the supratentorial white matter.  There are extensive old lacunar type infarcts in the cerebellum.    No extra-axial blood or fluid collections.    Normal vascular flow voids are preserved.    Skull/Extracranial Contents (limited evaluation):    Bone marrow signal intensity is normal.  Possible small mucosal retention cyst in the floor of the left maxillary sinus.      Impression    No evidence of acute intracranial pathology.  Specifically, no evidence of acute infarction.    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    Extensive old lacunar type infarctions in the cerebellum.    Electronically signed by resident: Gus Abarca  Date:    03/18/2024  Time:    22:58    Electronically signed by: Noah Kumar MD  Date:    03/18/2024  Time:    23:32   CT Head Without Contrast    Narrative    EXAMINATION:  CT HEAD WITHOUT CONTRAST    CLINICAL HISTORY:  Neuro deficit, acute, stroke suspected;    TECHNIQUE:  Low dose axial CT images obtained throughout the head without the use of intravenous contrast.  Axial, sagittal and coronal reconstructions were performed.    COMPARISON:  CT head  08/04/2021.    FINDINGS:  Intracranial compartment:    Ventricles are stable.  No hydrocephalus.  Cavum septum pellucidum et vergae.    Brain appears similar.  Generalized cerebral volume loss with focal encephalomalacia in the right frontal lobe.  Suspected chronic lacunar type infarct within the left cerebellar hemisphere.  Moderate confluent periventricular white matter hypoattenuation, likely chronic microvascular ischemic change.  No acute major territorial infarction or intraparenchymal hemorrhage.    No extra-axial blood or fluid collections.    Skull/extracranial contents (limited evaluation):    No fracture. Mastoid air cells and paranasal sinuses are essentially clear.      Impression    No evidence of acute intracranial pathology.  Additional evaluation as clinically warranted.    Generalized cerebral volume loss, chronic microvascular ischemic change, and focal right frontal lobe encephalomalacia likely from prior ischemic or traumatic injury.    Electronically signed by resident: Gus Abarca  Date:    03/18/2024  Time:    22:02    Electronically signed by: Noah Kumar MD  Date:    03/18/2024  Time:    22:24     Note: I have independently reviewed any/all imaging/labs/tests and agree with the report (s) as documented.  Any discrepancies will be as noted/demarcated by free text.  ECV, FNP-C 3/25/2024    ASSESSMENT:  1. Migraine with aura and without status migrainosus, not intractable    2. Persistent migraine aura without cerebral infarction and without status migrainosus, not intractable    3. Essential hypertension      PLAN:  - Will have patient begin to diligently track headaches/aura symptoms with relation to use of carafate, THC peppermints and melatonin.    - Treat acute migraines with tylenol   - HTN - BP elevated today, continue monitoring BP regularly at home, management per cardiology   - RTC in 2 months     \40 minutes of total time spent on the encounter, which includes 18 minutes face  to face time and 22 minutes non face to face time preparing to see the patient (eg, review of tests), obtaining and/or reviewing separately obtained history, documenting clinical information in the electronic or other health record, independently interpreting results (not separately reported) and communicating results to the patient/family/caregiver or care coordination (not separately reported).     Questions and concerns were sought and answered to the patient's stated verbal satisfaction.  The patient verbalizes understanding and agreement with the above stated treatment plan.     Visit today included increased complexity associated with the care of the episodic problem migraine with aura addressed and managing the longitudinal care of the patient due to the serious and/or complex managed problem.  Will have patient return to clinic in 2 months for follow-up.     CC: Johanne Leonard MD Elizabeth C. Vulevich, FNP-C  [facility]   [phone]    Dr. Lowe was available during today's encounter.

## 2024-04-04 PROBLEM — E66.01 SEVERE OBESITY (BMI 35.0-39.9) WITH COMORBIDITY: Status: ACTIVE | Noted: 2024-04-04

## 2024-04-16 ENCOUNTER — HOSPITAL ENCOUNTER (OUTPATIENT)
Dept: RADIOLOGY | Facility: OTHER | Age: 84
Discharge: HOME | End: 2024-04-16
Attending: INTERNAL MEDICINE
Payer: MEDICARE

## 2024-04-16 DIAGNOSIS — M81.0 AGE-RELATED OSTEOPOROSIS WITHOUT CURRENT PATHOLOGICAL FRACTURE: ICD-10-CM

## 2024-04-16 DIAGNOSIS — Z12.31 SCREENING MAMMOGRAM FOR BREAST CANCER: ICD-10-CM

## 2024-04-16 PROCEDURE — 77067 SCR MAMMO BI INCL CAD: CPT | Mod: TC

## 2024-04-16 PROCEDURE — 77080 DXA BONE DENSITY AXIAL: CPT | Mod: TC

## 2024-04-16 PROCEDURE — 77067 SCR MAMMO BI INCL CAD: CPT | Mod: 26,,, | Performed by: RADIOLOGY

## 2024-04-16 PROCEDURE — 77080 DXA BONE DENSITY AXIAL: CPT | Mod: 26,,, | Performed by: RADIOLOGY

## 2024-04-16 PROCEDURE — 77063 BREAST TOMOSYNTHESIS BI: CPT | Mod: 26,,, | Performed by: RADIOLOGY

## 2024-05-17 ENCOUNTER — TELEPHONE (OUTPATIENT)
Dept: NEUROLOGY | Facility: CLINIC | Age: 84
End: 2024-05-17
Payer: MEDICARE

## 2024-05-17 NOTE — TELEPHONE ENCOUNTER
Spoke to Pt about rescheduling her June 7 appt. Pt's virtual visit has been rescheduled for June 14.

## 2024-06-14 ENCOUNTER — OFFICE VISIT (OUTPATIENT)
Dept: NEUROLOGY | Facility: CLINIC | Age: 84
End: 2024-06-14
Payer: MEDICARE

## 2024-06-14 DIAGNOSIS — I10 ESSENTIAL HYPERTENSION: ICD-10-CM

## 2024-06-14 DIAGNOSIS — G43.109 MIGRAINE WITH AURA AND WITHOUT STATUS MIGRAINOSUS, NOT INTRACTABLE: Primary | ICD-10-CM

## 2024-06-14 PROCEDURE — 99214 OFFICE O/P EST MOD 30 MIN: CPT | Mod: 95,,, | Performed by: NURSE PRACTITIONER

## 2024-06-14 PROCEDURE — G2211 COMPLEX E/M VISIT ADD ON: HCPCS | Mod: 95,,, | Performed by: NURSE PRACTITIONER

## 2024-06-14 NOTE — PROGRESS NOTES
Established Patient   SUBJECTIVE:  Patient ID: Annette Lynch   Chief Complaint: Follow-up    History of Present Illness:  Annette Lynch is a 83 y.o. female who presents for follow-up of headaches via virtual visit.     The patient location is: in [place]   The chief complaint leading to consultation is: Follow-up  Visit type: Virtual visit with synchronous audio and video  Total time spent with patient: 8 min  Each patient to whom he or she provides medical services by telemedicine is:  (1) informed of the relationship between the physician and patient and the respective role of any other health care provider with respect to management of the patient; and (2) notified that he or she may decline to receive medical services by telemedicine and may withdraw from such care at any time.    Recommendations made at last Office Visit on 3/25/24:  - Will have patient begin to diligently track headaches/aura symptoms with relation to use of carafate, THC peppermints and melatonin.    - Treat acute migraines with tylenol   - HTN - BP elevated today, continue monitoring BP regularly at home, management per cardiology   - RTC in 2 months     06/14/2024 - Interval History:  Stopped taking both melatonin and THC, has been taking sucralfate once daily.  Feels headaches have been much better over the last 3 months.   Has noticed when she is on the computer for extended periods of time, she often will begin to get visual aura symptoms, this can occur in as little as one hour, does not occur every time she is on the computer, however it does occur fairly often.  Symptoms typically resolve within a few minutes of taking tylenol and walking away from computer screen.     Otherwise, information below is still accurate and current.     03/25/2024 - Interval History:  Migraines had been relatively well controlled until about a week ago, was eating dinner and began to see a pattern on her fish, would see pattern on her drapery as well.  A  "few hours later, while watching television, began to see "red or yellow snakes" across her vision.  Had tightness across her forehead and nausea but did not feel symptoms were migraine related. Presented to ED as visual hallucinations were concerning.  Thorough work-up completed including MRI Brain, CT Head, ECG, CMP, LDL, TSH, CBC, PT-INR, and U/A.  Ct Head negative.  MR Brain showed "no evidence of acute intracranial pathology.  Specifically, no evidence of acute infarction.  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  Extensive old lacunar type infarctions in the cerebellum".  She was treated with IV toradol, compazine and benadryl and discharged home.  Reports symptoms persisted for three days following.  No significant changes around the time her symptoms began, though she has been taking carafate more frequently, which had been identified as a migraine trigger for her in years past.  She has also been taking melatonin and THC peppermints to help with sleep.   Of note, BP elevated in clinic today, 184/76 mmHg.  Monitors BP regularly at home, readings typically 130's mmHg systolic and 80-90'smmHg diastolic.      Otherwise, information below is still accurate and current.      04/13/2022 - Interval History:  Symptoms determined to be related to sucralfate which has since been discontinued and symptoms have resolved.  Had a migraine two days ago following tense conversation with daughter, was resolved after taking tylenol and drinking a cup of coffee.  She has been tracking her migraines, last migraine tracked prior to two days ago was 3/5/22.   reports she suffers with more frequent headaches than she tracks.   is concerned she is taking tylenol too frequently.  Patient states she typically takes tylenol every other day or so.    Blood pressure elevated in clinic today, 154/89 mmHg.  Pt states she has been monitoring her blood pressure regularly at home " "and readings are typically much lower.  She is no longer taking lisinopril nor valsartan and requests medications be removed from med list.      Otherwise, information below is still accurate and current.      History of Present Illness:   81 y.o. female with migraines, anxiety, HTN, depression, who presents to clinic with her  for evaluation of headaches.  History is reported by both the patient and her .  Migraines began in childhood, initially diagnosed with migraine headaches after having her children.  Migraines typically preceded by visual aura, describes seeing a "kaleidoscope".  Recently has been suffering with more frequent migraines, visual aura.  Typical triggers include certain foods, "stress", "emotions", being sick.  Currently suffering with a migraine anywhere from once per week up to every other day, depending on triggers.  Visual aura typically lasts less than an hour, will dissipate while she drinks coffee, she typically takes 2 tabs extra strength tylenol, migraine headache may or may not begin.  Associated symptoms include nausea, photophobia, phonophobia.    During October, she began experiencing episodes of shaking, dizziness, lightheadedness, headaches, elevated blood pressure.  Has not had recurrent episode since October.     Current Medications:    acetaminophen (TYLENOL) 500 MG tablet, Take 500 mg by mouth every 6 (six) hours as needed for Pain., Disp: , Rfl:     ascorbic acid, vitamin C, (VITAMIN C) 1000 MG tablet, Take 1,000 mg by mouth once daily., Disp: , Rfl:     aspirin (ECOTRIN) 81 MG EC tablet, Take 81 mg by mouth once daily., Disp: , Rfl:     cetirizine (ZYRTEC) 10 MG tablet, Take 10 mg by mouth once daily., Disp: , Rfl:     cloNIDine (CATAPRES) 0.1 MG tablet, Take 1 tablet (0.1 mg total) by mouth 2 (two) times daily., Disp: 60 tablet, Rfl: 11    co-enzyme Q-10 30 mg capsule, Take 30 mg by mouth 3 (three) times daily., Disp: , Rfl:     DYMISTA 137-50 mcg/spray Spry " nassal spray, SPRAY ONCE IEN BID, Disp: 1 each, Rfl: 1    famotidine (PEPCID) 10 MG tablet, Take 10 mg by mouth 2 (two) times daily., Disp: , Rfl:     magnesium 200 mg Tab, Take 400 mg by mouth once., Disp: , Rfl:     multivitamin (THERAGRAN) per tablet, Take 1 tablet by mouth once daily., Disp: , Rfl:     omega-3 fatty acids 1,000 mg Cap, Take by mouth., Disp: , Rfl:     ondansetron (ZOFRAN) 8 MG tablet, Take 1 tablet (8 mg total) by mouth every 8 (eight) hours as needed for Nausea., Disp: 20 tablet, Rfl: 1    ondansetron (ZOFRAN-ODT) 4 MG TbDL, Take 2 tablets (8 mg total) by mouth every 8 (eight) hours as needed (nausea)., Disp: 20 tablet, Rfl: 1    pantoprazole (PROTONIX) 40 MG tablet, TAKE 1 TABLET(40 MG) BY MOUTH EVERY DAY, Disp: 90 tablet, Rfl: 1    sucralfate (CARAFATE) 1 gram tablet, Take 1 tablet (1 g total) by mouth 4 (four) times daily as needed., Disp: 45 tablet, Rfl: 2    vitamin D (VITAMIN D3) 1000 units Tab, Take 1,000 Units by mouth once daily., Disp: , Rfl:   No current facility-administered medications for this visit.    Facility-Administered Medications Ordered in Other Visits:     tranexamic acid injection Soln 1,000 mg, 1,000 mg, Intravenous, On Call Procedure, Gus Groves MD    Review of Systems - A review of 10+ systems was conducted with pertinent positive and negative findings documented in HPI with all other systems reviewed and negative.    PFSH: Past medical, family, and social history reviewed as documented in chart with pertinent positive medical, family, and social history detailed in HPI.    OBJECTIVE:  Vitals: There were no vitals taken for this visit.     Physical Exam:  Constitutional: she appears well-developed and well-nourished. she is well groomed. NAD.     Review of Data:   Notes from internal medicine reviewed   Labs:  Admission on 03/18/2024, Discharged on 03/19/2024   Component Date Value Ref Range Status    WBC 03/18/2024 7.86  3.90 - 12.70 K/uL Final    RBC  03/18/2024 5.12  4.00 - 5.40 M/uL Final    Hemoglobin 03/18/2024 15.9  12.0 - 16.0 g/dL Final    Hematocrit 03/18/2024 47.6  37.0 - 48.5 % Final    MCV 03/18/2024 93  82 - 98 fL Final    MCH 03/18/2024 31.1 (H)  27.0 - 31.0 pg Final    MCHC 03/18/2024 33.4  32.0 - 36.0 g/dL Final    RDW 03/18/2024 13.4  11.5 - 14.5 % Final    Platelets 03/18/2024 276  150 - 450 K/uL Final    MPV 03/18/2024 10.1  9.2 - 12.9 fL Final    Immature Granulocytes 03/18/2024 0.3  0.0 - 0.5 % Final    Gran # (ANC) 03/18/2024 5.6  1.8 - 7.7 K/uL Final    Immature Grans (Abs) 03/18/2024 0.02  0.00 - 0.04 K/uL Final    Lymph # 03/18/2024 1.6  1.0 - 4.8 K/uL Final    Mono # 03/18/2024 0.5  0.3 - 1.0 K/uL Final    Eos # 03/18/2024 0.2  0.0 - 0.5 K/uL Final    Baso # 03/18/2024 0.04  0.00 - 0.20 K/uL Final    nRBC 03/18/2024 0  0 /100 WBC Final    Gran % 03/18/2024 70.6  38.0 - 73.0 % Final    Lymph % 03/18/2024 20.4  18.0 - 48.0 % Final    Mono % 03/18/2024 5.9  4.0 - 15.0 % Final    Eosinophil % 03/18/2024 2.3  0.0 - 8.0 % Final    Basophil % 03/18/2024 0.5  0.0 - 1.9 % Final    Differential Method 03/18/2024 Automated   Final    Sodium 03/18/2024 142  136 - 145 mmol/L Final    Potassium 03/18/2024 4.0  3.5 - 5.1 mmol/L Final    Chloride 03/18/2024 104  95 - 110 mmol/L Final    CO2 03/18/2024 25  23 - 29 mmol/L Final    Glucose 03/18/2024 102  70 - 110 mg/dL Final    BUN 03/18/2024 13  8 - 23 mg/dL Final    Creatinine 03/18/2024 0.7  0.5 - 1.4 mg/dL Final    Calcium 03/18/2024 10.3  8.7 - 10.5 mg/dL Final    Total Protein 03/18/2024 8.0  6.0 - 8.4 g/dL Final    Albumin 03/18/2024 4.3  3.5 - 5.2 g/dL Final    Total Bilirubin 03/18/2024 0.5  0.1 - 1.0 mg/dL Final    Alkaline Phosphatase 03/18/2024 77  55 - 135 U/L Final    AST 03/18/2024 28  10 - 40 U/L Final    ALT 03/18/2024 22  10 - 44 U/L Final    eGFR 03/18/2024 >60.0  >60 mL/min/1.73 m^2 Final    Anion Gap 03/18/2024 13  8 - 16 mmol/L Final    Prothrombin Time 03/18/2024 10.1  9.0 - 12.5 sec  Final    INR 03/18/2024 0.9  0.8 - 1.2 Final    TSH 03/18/2024 1.062  0.400 - 4.000 uIU/mL Final    QRS Duration 03/18/2024 88  ms Final    [facility] QTC Calculation 03/18/2024 453  ms Final    Cholesterol 03/18/2024 198  120 - 199 mg/dL Final    Triglycerides 03/18/2024 113  30 - 150 mg/dL Final    HDL 03/18/2024 64  40 - 75 mg/dL Final    LDL Cholesterol 03/18/2024 111.4  63.0 - 159.0 mg/dL Final    HDL/Cholesterol Ratio 03/18/2024 32.3  20.0 - 50.0 % Final    Total Cholesterol/HDL Ratio 03/18/2024 3.1  2.0 - 5.0 Final    Non-HDL Cholesterol 03/18/2024 134  mg/dL Final    Specimen UA 03/18/2024 Urine, Clean Catch   Final    Color, UA 03/18/2024 Colorless (A)  Yellow, Straw, Radha Final    Appearance, UA 03/18/2024 Clear  Clear Final    pH, UA 03/18/2024 7.0  5.0 - 8.0 Final    Specific Gravity, UA 03/18/2024 1.005  1.005 - 1.030 Final    Protein, UA 03/18/2024 Negative  Negative Final    Glucose, UA 03/18/2024 Negative  Negative Final    Ketones, UA 03/18/2024 Negative  Negative Final    Bilirubin (UA) 03/18/2024 Negative  Negative Final    Occult Blood UA 03/18/2024 Negative  Negative Final    Nitrite, UA 03/18/2024 Negative  Negative Final    Leukocytes, UA 03/18/2024 Negative  Negative Final   Lab Visit on 03/13/2024   Component Date Value Ref Range Status    WBC 03/13/2024 8.19  3.90 - 12.70 K/uL Final    RBC 03/13/2024 5.08  4.00 - 5.40 M/uL Final    Hemoglobin 03/13/2024 15.8  12.0 - 16.0 g/dL Final    Hematocrit 03/13/2024 47.5  37.0 - 48.5 % Final    MCV 03/13/2024 94  82 - 98 fL Final    MCH 03/13/2024 31.1 (H)  27.0 - 31.0 pg Final    MCHC 03/13/2024 33.3  32.0 - 36.0 g/dL Final    RDW 03/13/2024 13.1  11.5 - 14.5 % Final    Platelets 03/13/2024 246  150 - 450 K/uL Final    MPV 03/13/2024 9.8  9.2 - 12.9 fL Final    Immature Granulocytes 03/13/2024 0.4  0.0 - 0.5 % Final    Gran # (ANC) 03/13/2024 5.7  1.8 - 7.7 K/uL Final    Immature Grans (Abs) 03/13/2024 0.03  0.00 - 0.04 K/uL Final    Lymph # 03/13/2024  1.7  1.0 - 4.8 K/uL Final    Mono # 03/13/2024 0.4  0.3 - 1.0 K/uL Final    Eos # 03/13/2024 0.2  0.0 - 0.5 K/uL Final    Baso # 03/13/2024 0.03  0.00 - 0.20 K/uL Final    nRBC 03/13/2024 0  0 /100 WBC Final    Gran % 03/13/2024 70.0  38.0 - 73.0 % Final    Lymph % 03/13/2024 21.1  18.0 - 48.0 % Final    Mono % 03/13/2024 5.3  4.0 - 15.0 % Final    Eosinophil % 03/13/2024 2.8  0.0 - 8.0 % Final    Basophil % 03/13/2024 0.4  0.0 - 1.9 % Final    Differential Method 03/13/2024 Automated   Final    Sodium 03/13/2024 141  136 - 145 mmol/L Final    Potassium 03/13/2024 3.9  3.5 - 5.1 mmol/L Final    Chloride 03/13/2024 105  95 - 110 mmol/L Final    CO2 03/13/2024 25  23 - 29 mmol/L Final    Glucose 03/13/2024 108  70 - 110 mg/dL Final    BUN 03/13/2024 18  8 - 23 mg/dL Final    Creatinine 03/13/2024 0.7  0.5 - 1.4 mg/dL Final    Calcium 03/13/2024 9.9  8.7 - 10.5 mg/dL Final    Total Protein 03/13/2024 7.1  6.0 - 8.4 g/dL Final    Albumin 03/13/2024 4.2  3.5 - 5.2 g/dL Final    Total Bilirubin 03/13/2024 0.6  0.1 - 1.0 mg/dL Final    Alkaline Phosphatase 03/13/2024 71  55 - 135 U/L Final    AST 03/13/2024 21  10 - 40 U/L Final    ALT 03/13/2024 22  10 - 44 U/L Final    eGFR 03/13/2024 >60  >60 mL/min/1.73 m^2 Final    Anion Gap 03/13/2024 11  8 - 16 mmol/L Final    TSH 03/13/2024 0.585  0.400 - 4.000 uIU/mL Final     Imaging:  Results for orders placed or performed during the hospital encounter of 03/18/24   MRI Brain Without Contrast    Narrative    EXAMINATION:  MRI BRAIN WITHOUT CONTRAST    CLINICAL HISTORY:  Stroke, follow up;    TECHNIQUE:  Multiplanar multisequence MR imaging of the brain was performed without intravenous contrast.    COMPARISON:  CT 03/18/2024.    FINDINGS:  Intracranial Compartment:    The craniocervical junction is intact.  The sellar and parasellar structures are within normal limits. The ventricles are stable.  No hydrocephalus.    Brain appears unchanged.  No evidence of acute infarction.  No  acute intracranial hemorrhage.  Generalized cerebral volume loss with focal area of encephalomalacia within the right frontal lobe likely from prior ischemic or traumatic injury.  Superimposed moderate chronic microvascular ischemic change in the supratentorial white matter.  There are extensive old lacunar type infarcts in the cerebellum.    No extra-axial blood or fluid collections.    Normal vascular flow voids are preserved.    Skull/Extracranial Contents (limited evaluation):    Bone marrow signal intensity is normal.  Possible small mucosal retention cyst in the floor of the left maxillary sinus.      Impression    No evidence of acute intracranial pathology.  Specifically, no evidence of acute infarction.    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    Extensive old lacunar type infarctions in the cerebellum.    Electronically signed by resident: Gus Abarca  Date:    03/18/2024  Time:    22:58    Electronically signed by: Noah Kumar MD  Date:    03/18/2024  Time:    23:32   CT Head Without Contrast    Narrative    EXAMINATION:  CT HEAD WITHOUT CONTRAST    CLINICAL HISTORY:  Neuro deficit, acute, stroke suspected;    TECHNIQUE:  Low dose axial CT images obtained throughout the head without the use of intravenous contrast.  Axial, sagittal and coronal reconstructions were performed.    COMPARISON:  CT head 08/04/2021.    FINDINGS:  Intracranial compartment:    Ventricles are stable.  No hydrocephalus.  Cavum septum pellucidum et vergae.    Brain appears similar.  Generalized cerebral volume loss with focal encephalomalacia in the right frontal lobe.  Suspected chronic lacunar type infarct within the left cerebellar hemisphere.  Moderate confluent periventricular white matter hypoattenuation, likely chronic microvascular ischemic change.  No acute major territorial infarction or intraparenchymal hemorrhage.    No extra-axial blood or fluid  collections.    Skull/extracranial contents (limited evaluation):    No fracture. Mastoid air cells and paranasal sinuses are essentially clear.      Impression    No evidence of acute intracranial pathology.  Additional evaluation as clinically warranted.    Generalized cerebral volume loss, chronic microvascular ischemic change, and focal right frontal lobe encephalomalacia likely from prior ischemic or traumatic injury.    Electronically signed by resident: Gus Abarca  Date:    03/18/2024  Time:    22:02    Electronically signed by: Noah Kumar MD  Date:    03/18/2024  Time:    22:24     Note: I have independently reviewed any/all imaging/labs/tests and agree with the report (s) as documented.  Any discrepancies will be as noted/demarcated by free text.  TOMASA FIELDS-C 6/14/2024    ASSESSMENT:  1. Migraine with aura and without status migrainosus, not intractable    2. Essential hypertension      PLAN:  - Migraines have been stable over the last 3 months - no indication for prophylactic therapy at this time   - For acute migraines - tylenol   - Avoid prolonged screentime - known migraine trigger    - Continue tracking headaches   - HTN - chronic and stable, management per PCP  - RTC in 6 months or sooner if needed         Questions and concerns were sought and answered to the patient's stated verbal satisfaction.  The patient verbalizes understanding and agreement with the above stated treatment plan.     Visit today included increased complexity associated with the care of the episodic problem migraine with aura addressed and managing the longitudinal care of the patient due to the serious and/or complex managed problem(s).  Plan for patient to return to clinic in 6 months for follow-up visit.     CC: Johanne Leonard MD Elizabeth C. Vulevich, FNP-C  [facility]   [phone]    Dr. Lowe was available during today's encounter.

## 2024-07-12 ENCOUNTER — OFFICE VISIT (OUTPATIENT)
Dept: CARDIOLOGY | Facility: CLINIC | Age: 84
End: 2024-07-12
Payer: MEDICARE

## 2024-07-12 VITALS
DIASTOLIC BLOOD PRESSURE: 68 MMHG | HEART RATE: 66 BPM | WEIGHT: 193.38 LBS | BODY MASS INDEX: 35.37 KG/M2 | SYSTOLIC BLOOD PRESSURE: 128 MMHG | OXYGEN SATURATION: 98 %

## 2024-07-12 DIAGNOSIS — I10 ESSENTIAL HYPERTENSION: Primary | ICD-10-CM

## 2024-07-12 PROCEDURE — 99213 OFFICE O/P EST LOW 20 MIN: CPT | Mod: PBBFAC | Performed by: INTERNAL MEDICINE

## 2024-07-12 PROCEDURE — 99999 PR PBB SHADOW E&M-EST. PATIENT-LVL III: CPT | Mod: PBBFAC,,, | Performed by: INTERNAL MEDICINE

## 2024-07-12 NOTE — PROGRESS NOTES
Cardiology    7/12/2024  11:30 AM    Problem list  Patient Active Problem List   Diagnosis    Hiatal hernia    Depression    Elevated blood sugar    Age-related osteoporosis without current pathological fracture    Chest pain    Essential hypertension    Migraine with aura and without status migrainosus, not intractable    GERD (gastroesophageal reflux disease)    Nontraumatic complete tear of left rotator cuff    Degenerative superior labral anterior-to-posterior (SLAP) tear of left shoulder    Impingement syndrome of left shoulder    Severe obesity (BMI 35.0-39.9) with comorbidity       CC:  F/u    HPI:  Patient is doing well from a cardiac standpoint.  Her blood pressure is well controlled at home based on her blood pressure log.  She has a migraine headache today.  She stated that watching the news caused her to have a migraine.    Medications  Current Outpatient Medications   Medication Sig Dispense Refill    acetaminophen (TYLENOL) 500 MG tablet Take 500 mg by mouth every 6 (six) hours as needed for Pain.      ascorbic acid, vitamin C, (VITAMIN C) 1000 MG tablet Take 1,000 mg by mouth once daily.      aspirin (ECOTRIN) 81 MG EC tablet Take 81 mg by mouth once daily.      calcium carbonate (CALCIUM 300 ORAL) Take by mouth.      cetirizine (ZYRTEC) 10 MG tablet Take 10 mg by mouth once daily.      cloNIDine (CATAPRES) 0.1 MG tablet Take 1 tablet (0.1 mg total) by mouth 2 (two) times daily. 60 tablet 11    co-enzyme Q-10 30 mg capsule Take 30 mg by mouth 3 (three) times daily.      DYMISTA 137-50 mcg/spray [place] nassal spray SPRAY ONCE IEN BID 1 each 1    famotidine (PEPCID) 10 MG tablet Take 10 mg by mouth 2 (two) times daily.      magnesium 200 mg Tab Take 400 mg by mouth once.      multivitamin (THERAGRAN) per tablet Take 1 tablet by mouth once daily.      omega-3 fatty acids 1,000 mg Cap Take by mouth.      ondansetron (ZOFRAN) 8 MG tablet Take 1 tablet (8 mg total) by mouth every 8 (eight) hours as  needed for Nausea. 20 tablet 1    ondansetron (ZOFRAN-ODT) 4 MG TbDL Take 2 tablets (8 mg total) by mouth every 8 (eight) hours as needed (nausea). 20 tablet 1    pantoprazole (PROTONIX) 40 MG tablet TAKE 1 TABLET(40 MG) BY MOUTH EVERY DAY 90 tablet 1    pneumoc 20-keren conj-dip cr,PF, (PREVNAR 20, PF,) 0.5 mL Syrg injection Inject into the muscle. 0.5 mL 0    sucralfate (CARAFATE) 1 gram tablet Take 1 tablet (1 g total) by mouth 4 (four) times daily as needed. 45 tablet 2    vitamin D (VITAMIN D3) 1000 units Tab Take 1,000 Units by mouth once daily.       No current facility-administered medications for this visit.     Facility-Administered Medications Ordered in Other Visits   Medication Dose Route Frequency Provider Last Rate Last Admin    tranexamic acid injection Soln 1,000 mg  1,000 mg Intravenous On Call Procedure Gus Groves MD          Prior to Admission medications    Medication Sig Start Date End Date Taking? Authorizing Provider   acetaminophen (TYLENOL) 500 MG tablet Take 500 mg by mouth every 6 (six) hours as needed for Pain.   Yes Provider, Historical   ascorbic acid, vitamin C, (VITAMIN C) 1000 MG tablet Take 1,000 mg by mouth once daily.   Yes Provider, Historical   aspirin (ECOTRIN) 81 MG EC tablet Take 81 mg by mouth once daily.   Yes Provider, Historical   calcium carbonate (CALCIUM 300 ORAL) Take by mouth.   Yes Provider, Historical   cetirizine (ZYRTEC) 10 MG tablet Take 10 mg by mouth once daily.   Yes Provider, Historical   cloNIDine (CATAPRES) 0.1 MG tablet Take 1 tablet (0.1 mg total) by mouth 2 (two) times daily. 7/21/23 7/20/24 Yes Jose Francisco Perez MD   co-enzyme Q-10 30 mg capsule Take 30 mg by mouth 3 (three) times daily.   Yes Provider, Historical   DYMISTA 137-50 mcg/spray [place] n[place]al spray SPRAY ONCE IEN BID 12/21/23  Yes Johanne Leonard MD   famotidine (PEPCID) 10 MG tablet Take 10 mg by mouth 2 (two) times daily.   Yes Provider, Historical   magnesium 200 mg Tab Take  400 mg by mouth once.   Yes Provider, Historical   multivitamin (THERAGRAN) per tablet Take 1 tablet by mouth once daily.   Yes Provider, Historical   omega-3 fatty acids 1,000 mg Cap Take by mouth.   Yes Provider, Historical   ondansetron (ZOFRAN) 8 MG tablet Take 1 tablet (8 mg total) by mouth every 8 (eight) hours as needed for Nausea. 3/20/23  Yes Johanne Leonard MD   ondansetron (ZOFRAN-ODT) 4 MG TbDL Take 2 tablets (8 mg total) by mouth every 8 (eight) hours as needed (nausea). 6/14/23  Yes Juanito Monique MD   pantoprazole (PROTONIX) 40 MG tablet TAKE 1 TABLET(40 MG) BY MOUTH EVERY DAY 3/7/24  Yes Johanne Leonard MD   pneumoc 20-keren conj-dip cr,PF, (PREVNAR 20, PF,) 0.5 mL Syrg injection Inject into the muscle. 7/11/24  Yes Johanne Leonard MD   sucralfate (CARAFATE) 1 gram tablet Take 1 tablet (1 g total) by mouth 4 (four) times daily as needed. 4/30/24  Yes Johanne Leonard MD   vitamin D (VITAMIN D3) 1000 units Tab Take 1,000 Units by mouth once daily.   Yes Provider, Historical         History  Past Medical History:   Diagnosis Date    Allergy     Essential hypertension 08/31/2020    does not take any medication - Cardiologist is rubén    Hiatal hernia     Migraine     Stomach ulcer     TB (pulmonary tuberculosis)      Past Surgical History:   Procedure Laterality Date    ARTHROSCOPIC TENOTOMY OF BICEPS TENDON Left 4/27/2023    Procedure: TENOTOMY, BICEPS, ARTHROSCOPIC;  Surgeon: Gus Groves MD;  Location: [facility] OR;  Service: Orthopedics;  Laterality: Left;    DECOMPRESSION OF SUBACROMIAL SPACE Left 4/27/2023    Procedure: DECOMPRESSION, SUBACROMIAL SPACE;  Surgeon: Gus Groves MD;  Location: [facility] OR;  Service: Orthopedics;  Laterality: Left;    EXAMINATION UNDER ANESTHESIA Left 4/27/2023    Procedure: EXAM UNDER ANESTHESIA;  Surgeon: Gus Groves MD;  Location: [facility] OR;  Service: Orthopedics;  Laterality: Left;    EXCISION OF BURSA Left 4/27/2023    Procedure:  BURSECTOMY;  Surgeon: Gus Groves MD;  Location: [facility];  Service: Orthopedics;  Laterality: Left;    ROTATOR CUFF REPAIR Left 4/27/2023    Procedure: REPAIR, ROTATOR CUFF;  Surgeon: Gus Groves MD;  Location: [facility];  Service: Orthopedics;  Laterality: Left;    SHOULDER ARTHROSCOPY Left 4/27/2023    Procedure: ARTHROSCOPY, SHOULDER;  Surgeon: Gus Groves MD;  Location: [facility];  Service: Orthopedics;  Laterality: Left;    THORACENTESIS       Social History     Socioeconomic History    Marital status:    Tobacco Use    Smoking status: Never    Smokeless tobacco: Never   Substance and Sexual Activity    Alcohol use: Yes     Comment: socially    Drug use: Never    Sexual activity: Yes     Social Determinants of Health     Financial Resource Strain: Low Risk  (7/5/2024)    Overall Financial Resource Strain (CARDIA)     Difficulty of Paying Living Expenses: Not hard at all   Food Insecurity: No Food Insecurity (7/5/2024)    Hunger Vital Sign     Worried About Running Out of Food in the Last Year: Never true     Ran Out of Food in the Last Year: Never true   Transportation Needs: No Transportation Needs (3/22/2024)    PRAPARE - Transportation     Lack of Transportation (Medical): No     Lack of Transportation (Non-Medical): No   Physical Activity: Unknown (7/5/2024)    Exercise Vital Sign     Days of Exercise per Week: Patient declined     Minutes of Exercise per Session: 0 min   Stress: Patient Declined (7/5/2024)    [employer] [place] [employer] - Occupational Stress Questionnaire     Feeling of Stress : Patient declined   Housing Stability: Low Risk  (3/22/2024)    Housing Stability Vital Sign     Unable to Pay for Housing in the Last Year: No     Number of Places Lived in the Last Year: 1     Unstable Housing in the Last Year: No         Allergies  Review of patient's allergies indicates:   Allergen Reactions    Hydrocortisone Swelling    Aspirin      Pt is okay to take 81mg daily     Cortisone     Hydrocodone Nausea And Vomiting         Review of Systems   Review of Systems   Constitutional: Negative for decreased appetite, fever and weight loss.   HENT:  Negative for congestion and nosebleeds.    Eyes:  Negative for double vision, vision loss in left eye, vision loss in right eye and visual disturbance.   Cardiovascular:  Negative for chest pain, claudication, cyanosis, dyspnea on exertion, irregular heartbeat, leg swelling, near-syncope, orthopnea, palpitations, paroxysmal nocturnal dyspnea and syncope.   Respiratory:  Negative for cough, hemoptysis, shortness of breath, sleep disturbances due to breathing, snoring, sputum production and wheezing.    Endocrine: Negative for cold intolerance and heat intolerance.   Skin:  Negative for nail changes and rash.   Musculoskeletal:  Negative for joint pain, muscle cramps, muscle weakness and myalgias.   Gastrointestinal:  Negative for change in bowel habit, heartburn, hematemesis, hematochezia, hemorrhoids and melena.   Neurological:  Negative for dizziness, focal weakness and headaches.         Physical Exam  Wt Readings from Last 1 Encounters:   07/12/24 87.7 kg (193 lb 6.4 oz)     BP Readings from Last 3 Encounters:   07/12/24 128/68   04/04/24 130/72   03/25/24 (!) 184/76     Pulse Readings from Last 1 Encounters:   07/12/24 66     Body mass index is 35.37 kg/m².    Physical Exam  Constitutional:       Appearance: She is well-developed.   HENT:      Head: Atraumatic.   Eyes:      General: No scleral icterus.  Neck:      Vascular: Normal carotid pulses. No carotid bruit, hepatojugular reflux or JVD.   Cardiovascular:      Rate and Rhythm: Normal rate and regular rhythm.      Chest Wall: PMI is not displaced.      Pulses: Intact distal pulses.           Carotid pulses are 2+ on the right side and 2+ on the left side.       Radial pulses are 2+ on the right side and 2+ on the left side.        Dorsalis pedis pulses are 2+ on the right side and 2+  on the left side.      Heart sounds: Normal heart sounds, S1 normal and S2 normal. No murmur heard.     No friction rub.   Pulmonary:      Effort: Pulmonary effort is normal. No respiratory distress.      Breath sounds: Normal breath sounds. No stridor. No wheezing or rales.   Chest:      Chest wall: No tenderness.   Abdominal:      General: Bowel sounds are normal.      Palpations: Abdomen is soft.   Musculoskeletal:      Cervical back: Neck supple. No edema.   Skin:     General: Skin is warm and dry.      Nails: There is no clubbing.   Neurological:      Mental Status: She is alert and oriented to person, place, and time.   Psychiatric:         Behavior: Behavior normal.         Thought Content: Thought content normal.             Assessment  1. Essential hypertension  Well controlled on current meds, continue meds and monitor    2. BMI 34.0-34.9,adult  unchanged        Plan and Discussion  Discussed her blood pressure is well controlled at home.  Recommend to continue with current medications.    Follow Up  6 months      Jose Francisco Perez MD, F.A.C.C, F.S.C.A.I.      Total professional time spent for the encounter: 20 minutes  Time was spent preparing to see the patient, reviewing results of prior testing, obtaining and/or reviewing separately obtained history, performing a medically appropriate examination and interview, counseling and educating the patient/family, ordering medications/tests/procedures, referring and communicating with other health care professionals, documenting clinical information in the electronic health record, and independently interpreting results.    Disclaimer: This document was created using voice recognition software ([employer] Fluency Direct). Although it may be edited, this document may contain errors related to incorrect recognition of the spoken word. Please call the physician if clarification is needed.

## 2024-09-13 ENCOUNTER — TELEPHONE (OUTPATIENT)
Dept: NEUROLOGY | Facility: CLINIC | Age: 84
End: 2024-09-13
Payer: MEDICARE

## 2024-09-13 NOTE — TELEPHONE ENCOUNTER
Spoke to Pt about rescheduling her virtual visit for September 20. Pt's virtual visit has been rescheduled for September 18.

## 2024-10-18 ENCOUNTER — LAB VISIT (OUTPATIENT)
Dept: LAB | Facility: HOSPITAL | Age: 84
End: 2024-10-18
Attending: INTERNAL MEDICINE
Payer: MEDICARE

## 2024-10-18 DIAGNOSIS — I10 ESSENTIAL HYPERTENSION: ICD-10-CM

## 2024-10-18 DIAGNOSIS — R73.9 ELEVATED BLOOD SUGAR: ICD-10-CM

## 2024-10-18 DIAGNOSIS — Z79.899 MEDICATION MANAGEMENT: ICD-10-CM

## 2024-10-18 DIAGNOSIS — M81.0 AGE-RELATED OSTEOPOROSIS WITHOUT CURRENT PATHOLOGICAL FRACTURE: ICD-10-CM

## 2024-10-18 DIAGNOSIS — K21.9 GASTROESOPHAGEAL REFLUX DISEASE, UNSPECIFIED WHETHER ESOPHAGITIS PRESENT: ICD-10-CM

## 2024-10-18 DIAGNOSIS — E66.01 SEVERE OBESITY (BMI 35.0-39.9) WITH COMORBIDITY: ICD-10-CM

## 2024-10-18 DIAGNOSIS — K44.9 HIATAL HERNIA: ICD-10-CM

## 2024-10-18 LAB
25(OH)D3+25(OH)D2 SERPL-MCNC: 102 NG/ML (ref 30–96)
ANION GAP SERPL CALC-SCNC: 8 MMOL/L (ref 8–16)
BUN SERPL-MCNC: 20 MG/DL (ref 8–23)
CALCIUM SERPL-MCNC: 9.5 MG/DL (ref 8.7–10.5)
CHLORIDE SERPL-SCNC: 106 MMOL/L (ref 95–110)
CO2 SERPL-SCNC: 26 MMOL/L (ref 23–29)
CREAT SERPL-MCNC: 0.7 MG/DL (ref 0.5–1.4)
EST. GFR  (NO RACE VARIABLE): >60 ML/MIN/1.73 M^2
GLUCOSE SERPL-MCNC: 115 MG/DL (ref 70–110)
MAGNESIUM SERPL-MCNC: 2 MG/DL (ref 1.6–2.6)
POTASSIUM SERPL-SCNC: 4.2 MMOL/L (ref 3.5–5.1)
SODIUM SERPL-SCNC: 140 MMOL/L (ref 136–145)
TSH SERPL DL<=0.005 MIU/L-ACNC: 0.67 UIU/ML (ref 0.4–4)
VIT B12 SERPL-MCNC: 1435 PG/ML (ref 210–950)

## 2024-10-18 PROCEDURE — 80048 BASIC METABOLIC PNL TOTAL CA: CPT | Performed by: INTERNAL MEDICINE

## 2024-10-18 PROCEDURE — 83735 ASSAY OF MAGNESIUM: CPT | Performed by: INTERNAL MEDICINE

## 2024-10-18 PROCEDURE — 84443 ASSAY THYROID STIM HORMONE: CPT | Performed by: INTERNAL MEDICINE

## 2024-10-18 PROCEDURE — 82607 VITAMIN B-12: CPT | Performed by: INTERNAL MEDICINE

## 2024-10-18 PROCEDURE — 82306 VITAMIN D 25 HYDROXY: CPT | Performed by: INTERNAL MEDICINE

## 2024-10-18 PROCEDURE — 36415 COLL VENOUS BLD VENIPUNCTURE: CPT | Mod: PO | Performed by: INTERNAL MEDICINE

## 2025-02-27 ENCOUNTER — HOSPITAL ENCOUNTER (EMERGENCY)
Facility: OTHER | Age: 85
Discharge: HOME | End: 2025-02-27
Attending: EMERGENCY MEDICINE
Payer: MEDICARE

## 2025-02-27 VITALS
RESPIRATION RATE: 18 BRPM | DIASTOLIC BLOOD PRESSURE: 77 MMHG | SYSTOLIC BLOOD PRESSURE: 177 MMHG | OXYGEN SATURATION: 96 % | TEMPERATURE: 98 F | HEART RATE: 73 BPM

## 2025-02-27 DIAGNOSIS — R05.9 COUGH: ICD-10-CM

## 2025-02-27 DIAGNOSIS — R11.0 NAUSEA: ICD-10-CM

## 2025-02-27 DIAGNOSIS — I10 HYPERTENSION, UNSPECIFIED TYPE: ICD-10-CM

## 2025-02-27 DIAGNOSIS — R52 BODY ACHES: ICD-10-CM

## 2025-02-27 DIAGNOSIS — R68.89 NOT FEELING GREAT: ICD-10-CM

## 2025-02-27 DIAGNOSIS — R19.7 DIARRHEA, UNSPECIFIED TYPE: ICD-10-CM

## 2025-02-27 DIAGNOSIS — J10.1 INFLUENZA A: Primary | ICD-10-CM

## 2025-02-27 LAB
ALBUMIN SERPL BCP-MCNC: 3.8 G/DL (ref 3.5–5.2)
ALP SERPL-CCNC: 66 U/L (ref 40–150)
ALT SERPL W/O P-5'-P-CCNC: 20 U/L (ref 10–44)
ANION GAP SERPL CALC-SCNC: 10 MMOL/L (ref 8–16)
AST SERPL-CCNC: 24 U/L (ref 10–40)
BASOPHILS # BLD AUTO: 0 K/UL (ref 0–0.2)
BASOPHILS NFR BLD: 0 % (ref 0–1.9)
BILIRUB SERPL-MCNC: 0.5 MG/DL (ref 0.1–1)
BILIRUB UR QL STRIP: NEGATIVE
BNP SERPL-MCNC: 32 PG/ML (ref 0–99)
BUN SERPL-MCNC: 6 MG/DL (ref 8–23)
CALCIUM SERPL-MCNC: 9.7 MG/DL (ref 8.7–10.5)
CHLORIDE SERPL-SCNC: 104 MMOL/L (ref 95–110)
CK SERPL-CCNC: 39 U/L (ref 20–180)
CLARITY UR: CLEAR
CO2 SERPL-SCNC: 26 MMOL/L (ref 23–29)
COLOR UR: COLORLESS
CREAT SERPL-MCNC: 0.7 MG/DL (ref 0.5–1.4)
CTP QC/QA: YES
CTP QC/QA: YES
DIFFERENTIAL METHOD BLD: ABNORMAL
EOSINOPHIL # BLD AUTO: 0 K/UL (ref 0–0.5)
EOSINOPHIL NFR BLD: 0.6 % (ref 0–8)
ERYTHROCYTE [DISTWIDTH] IN BLOOD BY AUTOMATED COUNT: 12.7 % (ref 11.5–14.5)
EST. GFR  (NO RACE VARIABLE): >60 ML/MIN/1.73 M^2
GLUCOSE SERPL-MCNC: 124 MG/DL (ref 70–110)
GLUCOSE UR QL STRIP: NEGATIVE
HCT VFR BLD AUTO: 45.6 % (ref 37–48.5)
HCV AB SERPL QL IA: NEGATIVE
HGB BLD-MCNC: 14.9 G/DL (ref 12–16)
HGB UR QL STRIP: NEGATIVE
HIV 1+2 AB+HIV1 P24 AG SERPL QL IA: NEGATIVE
IMM GRANULOCYTES # BLD AUTO: 0.02 K/UL (ref 0–0.04)
IMM GRANULOCYTES NFR BLD AUTO: 0.3 % (ref 0–0.5)
KETONES UR QL STRIP: ABNORMAL
LEUKOCYTE ESTERASE UR QL STRIP: NEGATIVE
LIPASE SERPL-CCNC: 14 U/L (ref 4–60)
LYMPHOCYTES # BLD AUTO: 1.1 K/UL (ref 1–4.8)
LYMPHOCYTES NFR BLD: 16.4 % (ref 18–48)
MAGNESIUM SERPL-MCNC: 2 MG/DL (ref 1.6–2.6)
MCH RBC QN AUTO: 31 PG (ref 27–31)
MCHC RBC AUTO-ENTMCNC: 32.7 G/DL (ref 32–36)
MCV RBC AUTO: 95 FL (ref 82–98)
MONOCYTES # BLD AUTO: 0.3 K/UL (ref 0.3–1)
MONOCYTES NFR BLD: 4.2 % (ref 4–15)
NEUTROPHILS # BLD AUTO: 5.3 K/UL (ref 1.8–7.7)
NEUTROPHILS NFR BLD: 78.5 % (ref 38–73)
NITRITE UR QL STRIP: NEGATIVE
NRBC BLD-RTO: 0 /100 WBC
OHS QRS DURATION: 92 MS
OHS QTC CALCULATION: 447 MS
PH UR STRIP: 7 [PH] (ref 5–8)
PLATELET # BLD AUTO: 171 K/UL (ref 150–450)
PMV BLD AUTO: 10.2 FL (ref 9.2–12.9)
POC MOLECULAR INFLUENZA A AGN: POSITIVE
POC MOLECULAR INFLUENZA B AGN: NEGATIVE
POTASSIUM SERPL-SCNC: 4 MMOL/L (ref 3.5–5.1)
PROT SERPL-MCNC: 7.6 G/DL (ref 6–8.4)
PROT UR QL STRIP: NEGATIVE
RBC # BLD AUTO: 4.8 M/UL (ref 4–5.4)
SARS-COV-2 RDRP RESP QL NAA+PROBE: NEGATIVE
SODIUM SERPL-SCNC: 140 MMOL/L (ref 136–145)
SP GR UR STRIP: <1.005 (ref 1–1.03)
TROPONIN I SERPL DL<=0.01 NG/ML-MCNC: 0.01 NG/ML (ref 0–0.03)
URN SPEC COLLECT METH UR: ABNORMAL
UROBILINOGEN UR STRIP-ACNC: NEGATIVE EU/DL
WBC # BLD AUTO: 6.71 K/UL (ref 3.9–12.7)

## 2025-02-27 PROCEDURE — 80053 COMPREHEN METABOLIC PANEL: CPT | Performed by: EMERGENCY MEDICINE

## 2025-02-27 PROCEDURE — 82550 ASSAY OF CK (CPK): CPT | Performed by: EMERGENCY MEDICINE

## 2025-02-27 PROCEDURE — 84484 ASSAY OF TROPONIN QUANT: CPT | Performed by: EMERGENCY MEDICINE

## 2025-02-27 PROCEDURE — 87389 HIV-1 AG W/HIV-1&-2 AB AG IA: CPT | Performed by: EMERGENCY MEDICINE

## 2025-02-27 PROCEDURE — 93005 ELECTROCARDIOGRAM TRACING: CPT

## 2025-02-27 PROCEDURE — 86803 HEPATITIS C AB TEST: CPT | Performed by: EMERGENCY MEDICINE

## 2025-02-27 PROCEDURE — 93010 ELECTROCARDIOGRAM REPORT: CPT | Mod: ,,, | Performed by: INTERNAL MEDICINE

## 2025-02-27 PROCEDURE — 83880 ASSAY OF NATRIURETIC PEPTIDE: CPT | Performed by: EMERGENCY MEDICINE

## 2025-02-27 PROCEDURE — 96375 TX/PRO/DX INJ NEW DRUG ADDON: CPT

## 2025-02-27 PROCEDURE — 94761 N-INVAS EAR/PLS OXIMETRY MLT: CPT

## 2025-02-27 PROCEDURE — 83690 ASSAY OF LIPASE: CPT | Performed by: EMERGENCY MEDICINE

## 2025-02-27 PROCEDURE — 83735 ASSAY OF MAGNESIUM: CPT | Performed by: EMERGENCY MEDICINE

## 2025-02-27 PROCEDURE — 99285 EMERGENCY DEPT VISIT HI MDM: CPT | Mod: 25

## 2025-02-27 PROCEDURE — 25000003 PHARM REV CODE 250: Performed by: EMERGENCY MEDICINE

## 2025-02-27 PROCEDURE — 63600175 PHARM REV CODE 636 W HCPCS: Mod: JZ,TB | Performed by: EMERGENCY MEDICINE

## 2025-02-27 PROCEDURE — 81003 URINALYSIS AUTO W/O SCOPE: CPT | Performed by: EMERGENCY MEDICINE

## 2025-02-27 PROCEDURE — 96374 THER/PROPH/DIAG INJ IV PUSH: CPT

## 2025-02-27 PROCEDURE — 85025 COMPLETE CBC W/AUTO DIFF WBC: CPT | Performed by: EMERGENCY MEDICINE

## 2025-02-27 PROCEDURE — 87635 SARS-COV-2 COVID-19 AMP PRB: CPT | Performed by: EMERGENCY MEDICINE

## 2025-02-27 RX ORDER — OSELTAMIVIR PHOSPHATE 75 MG/1
75 CAPSULE ORAL 2 TIMES DAILY
Qty: 10 CAPSULE | Refills: 0 | Status: SHIPPED | OUTPATIENT
Start: 2025-02-27 | End: 2025-03-04

## 2025-02-27 RX ORDER — ONDANSETRON 4 MG/1
4 TABLET, ORALLY DISINTEGRATING ORAL EVERY 6 HOURS PRN
Qty: 15 TABLET | Refills: 0 | Status: SHIPPED | OUTPATIENT
Start: 2025-02-27

## 2025-02-27 RX ORDER — ACETAMINOPHEN 500 MG
1000 TABLET ORAL EVERY 6 HOURS PRN
Qty: 50 TABLET | Refills: 0 | Status: SHIPPED | OUTPATIENT
Start: 2025-02-27

## 2025-02-27 RX ORDER — FAMOTIDINE 10 MG/ML
20 INJECTION INTRAVENOUS
Status: COMPLETED | OUTPATIENT
Start: 2025-02-27 | End: 2025-02-27

## 2025-02-27 RX ORDER — MORPHINE SULFATE 2 MG/ML
2 INJECTION, SOLUTION INTRAMUSCULAR; INTRAVENOUS
Status: COMPLETED | OUTPATIENT
Start: 2025-02-27 | End: 2025-02-27

## 2025-02-27 RX ORDER — ACETAMINOPHEN 500 MG
1000 TABLET ORAL
Status: COMPLETED | OUTPATIENT
Start: 2025-02-27 | End: 2025-02-27

## 2025-02-27 RX ORDER — KETOROLAC TROMETHAMINE 30 MG/ML
10 INJECTION, SOLUTION INTRAMUSCULAR; INTRAVENOUS
Status: COMPLETED | OUTPATIENT
Start: 2025-02-27 | End: 2025-02-27

## 2025-02-27 RX ORDER — ONDANSETRON HYDROCHLORIDE 2 MG/ML
4 INJECTION, SOLUTION INTRAVENOUS
Status: COMPLETED | OUTPATIENT
Start: 2025-02-27 | End: 2025-02-27

## 2025-02-27 RX ORDER — DICLOFENAC SODIUM 10 MG/G
2 GEL TOPICAL 4 TIMES DAILY
Qty: 350 G | Refills: 0 | Status: SHIPPED | OUTPATIENT
Start: 2025-02-27

## 2025-02-27 RX ORDER — ALUMINUM HYDROXIDE, MAGNESIUM HYDROXIDE, AND SIMETHICONE 1200; 120; 1200 MG/30ML; MG/30ML; MG/30ML
15 SUSPENSION ORAL
Status: COMPLETED | OUTPATIENT
Start: 2025-02-27 | End: 2025-02-27

## 2025-02-27 RX ORDER — LOPERAMIDE HYDROCHLORIDE 2 MG/1
2 CAPSULE ORAL 4 TIMES DAILY PRN
Qty: 12 CAPSULE | Refills: 0 | Status: SHIPPED | OUTPATIENT
Start: 2025-02-27 | End: 2025-03-09

## 2025-02-27 RX ADMIN — ONDANSETRON 4 MG: 2 INJECTION INTRAMUSCULAR; INTRAVENOUS at 08:02

## 2025-02-27 RX ADMIN — KETOROLAC TROMETHAMINE 10 MG: 30 INJECTION, SOLUTION INTRAMUSCULAR; INTRAVENOUS at 09:02

## 2025-02-27 RX ADMIN — FAMOTIDINE 20 MG: 10 INJECTION, SOLUTION INTRAVENOUS at 08:02

## 2025-02-27 RX ADMIN — ACETAMINOPHEN 1000 MG: 500 TABLET, FILM COATED ORAL at 09:02

## 2025-02-27 RX ADMIN — MORPHINE SULFATE 2 MG: 2 INJECTION, SOLUTION INTRAMUSCULAR; INTRAVENOUS at 08:02

## 2025-02-27 RX ADMIN — ALUMINUM HYDROXIDE, MAGNESIUM HYDROXIDE, AND DIMETHICONE 15 ML: 200; 20; 200 SUSPENSION ORAL at 09:02

## 2025-02-27 NOTE — ED TRIAGE NOTES
Pt. Present to the ED with her spouse. Pt. Complains of body aches,headache and diarrhea. Pt. Denies SOB or chest pain. Pt. Is alert and ABC' s are intact.

## 2025-02-27 NOTE — ED PROVIDER NOTES
Encounter Date: 2/27/2025       History     Chief Complaint   Patient presents with    Generalized Body Aches     Headache, body aches, nausea x5 days. Denies sob, cp. Loose bowels x2 days. Attempted Dayquil, CBD tablets, tramadol w/o relief. Neg flu/covid test on Tuesday.       This is a very pleasant 84-year-old female who presents for evaluation of diffuse body aches, nausea, early satiety, recurrent loose bowel movements with fatigue and fever over last 3-4 days.  She has been using DayQuil and NyQuil to try and offer some relief but has continued to suffer with symptoms.  Notes that her  was recently ill with a similar constellation of symptoms which required treatment twice with antibiotics prior to improving.  She can not recall anything specifically like this in the past, nothing in particular seems to make it better and it seems to be persistent with time.    The history is provided by the patient, the spouse and medical records.     Review of patient's allergies indicates:   Allergen Reactions    Hydrocortisone Swelling    Aspirin      Pt is okay to take 81mg daily    Cortisone     Hydrocodone Nausea And Vomiting     Past Medical History:   Diagnosis Date    Allergy     Essential hypertension 08/31/2020    does not take any medication - Cardiologist is okay    Hiatal hernia     Migraine     Stomach ulcer     TB (pulmonary tuberculosis)      Past Surgical History:   Procedure Laterality Date    ARTHROSCOPIC TENOTOMY OF BICEPS TENDON Left 4/27/2023    Procedure: TENOTOMY, BICEPS, ARTHROSCOPIC;  Surgeon: Gus Groves MD;  Location: [facility];  Service: Orthopedics;  Laterality: Left;    DECOMPRESSION OF SUBACROMIAL SPACE Left 4/27/2023    Procedure: DECOMPRESSION, SUBACROMIAL SPACE;  Surgeon: Gus Groves MD;  Location: [facility] OR;  Service: Orthopedics;  Laterality: Left;    EXAMINATION UNDER ANESTHESIA Left 4/27/2023    Procedure: EXAM UNDER ANESTHESIA;  Surgeon: Gus Groves MD;  Location:  [facility] OR;  Service: Orthopedics;  Laterality: Left;    EXCISION OF BURSA Left 4/27/2023    Procedure: BURSECTOMY;  Surgeon: Gus Groves MD;  Location: [facility] OR;  Service: Orthopedics;  Laterality: Left;    ROTATOR CUFF REPAIR Left 4/27/2023    Procedure: REPAIR, ROTATOR CUFF;  Surgeon: Gus Groves MD;  Location: [facility] OR;  Service: Orthopedics;  Laterality: Left;    SHOULDER ARTHROSCOPY Left 4/27/2023    Procedure: ARTHROSCOPY, SHOULDER;  Surgeon: Gus Groves MD;  Location: [facility];  Service: Orthopedics;  Laterality: Left;    THORACENTESIS       Family History   Family history unknown: Yes     Social History[1]  Review of Systems  Constitutional-positive fever positive fatigue  HEENT-positive congestion  Eyes-no redness  Respiratory-no shortness of breath  Cardio-no chest pain  GI-no abdominal pain  Endocrine-no cold intolerance  -no difficulty urinating  MSK-positive myalgias  Skin-no rashes  Allergy-no environmental allergy  Neurologic-, no headache  Hematology-no swollen nodes  Behavioral-no confusion  Physical Exam     Initial Vitals   BP Pulse Resp Temp SpO2   02/27/25 0645 02/27/25 0647 02/27/25 0645 02/27/25 0645 02/27/25 0645   (!) 177/77 73 18 97.8 °F (36.6 °C) 96 %      MAP       --                Physical Exam  Constitutional:  Uncomfortable appearing 84-year-old female in mild distress  Eyes: Conjunctivae normal.  ENT       Head: Normocephalic, atraumatic.       Nose: Normal external appearance        Mouth/Throat: no strigulous respirations   Hematological/Lymphatic/Immunilogical: no visible lymphadenopathy   Cardiovascular: Normal rate,   Respiratory: Normal respiratory effort.   Gastrointestinal: non distended diffusely tender, no rebound, no guarding, negative Hitchcock sign, negative Rovsing sign  Musculoskeletal: Normal range of motion in all extremities. No obvious deformities or swelling.  Neurologic: Alert, oriented. Normal speech and language. No gross focal neurologic deficits are  appreciated.  Skin: Skin is warm, dry. No rash noted.  Psychiatric: Mood and affect are normal.   ED Course   Procedures  Labs Reviewed   CBC W/ AUTO DIFFERENTIAL - Abnormal       Result Value    WBC 6.71      RBC 4.80      Hemoglobin 14.9      Hematocrit 45.6      MCV 95      MCH 31.0      MCHC 32.7      RDW 12.7      Platelets 171      MPV 10.2      Immature Granulocytes 0.3      Gran # (ANC) 5.3      Immature Grans (Abs) 0.02      Lymph # 1.1      Mono # 0.3      Eos # 0.0      Baso # 0.00      nRBC 0      Gran % 78.5 (*)     Lymph % 16.4 (*)     Mono % 4.2      Eosinophil % 0.6      Basophil % 0.0      Differential Method Automated      Narrative:     Release to patient->Immediate   COMPREHENSIVE METABOLIC PANEL - Abnormal    Sodium 140      Potassium 4.0      Chloride 104      CO2 26      Glucose 124 (*)     BUN 6 (*)     Creatinine 0.7      Calcium 9.7      Total Protein 7.6      Albumin 3.8      Total Bilirubin 0.5      Alkaline Phosphatase 66      AST 24      ALT 20      eGFR >60      Anion Gap 10      Narrative:     Release to patient->Immediate   URINALYSIS, REFLEX TO URINE CULTURE - Abnormal    Specimen UA Urine, Clean Catch      Color, UA Colorless (*)     Appearance, UA Clear      pH, UA 7.0      Specific Gravity, UA <1.005 (*)     Protein, UA Negative      Glucose, UA Negative      Ketones, UA Trace (*)     Bilirubin (UA) Negative      Occult Blood UA Negative      Nitrite, UA Negative      Urobilinogen, UA Negative      Leukocytes, UA Negative      Narrative:     Specimen Source->Urine   POCT INFLUENZA A/B MOLECULAR - Abnormal    POC Molecular Influenza A Ag Positive (*)     POC Molecular Influenza B Ag Negative       Acceptable Yes     HEPATITIS C ANTIBODY    Hepatitis C Ab Negative      Narrative:     Release to patient->Immediate   HIV 1 / 2 ANTIBODY    HIV 1/2 Ag/Ab Negative      Narrative:     Release to patient->Immediate   MAGNESIUM    Magnesium 2.0      Narrative:     Release to  patient->Immediate   LIPASE    Lipase 14      Narrative:     Release to patient->Immediate   CK    CPK 39      Narrative:     Release to patient->Immediate   TROPONIN I    Troponin I 0.007      Narrative:     Release to patient->Immediate   B-TYPE NATRIURETIC PEPTIDE    BNP 32      Narrative:     Release to patient->Immediate   SARS-COV-2 RDRP GENE    POC Rapid COVID Negative       Acceptable Yes          ECG Results              EKG 12-lead (Final result)        Collection Time Result Time QRS Duration OHS QTC Calculation    02/27/25 08:25:38 02/27/25 14:23:00 92 447                     Final result by Interface, Lab In [facility] (02/27/25 14:23:05)                   Narrative:    Test Reason : R68.89,    Vent. Rate :  71 BPM     Atrial Rate :  71 BPM     P-R Int : 190 ms          QRS Dur :  92 ms      QT Int : 412 ms       P-R-T Axes :  45  -8  37 degrees    QTcB Int : 447 ms    Normal sinus rhythm  Minimal voltage criteria for LVH, may be normal variant  Borderline Abnormal ECG    Confirmed by GRIS Yip ([id number]) on 2/27/2025 2:22:59 PM    Referred By: AAAREFERRAL SELF           Confirmed By: GRIS Yip                      Wet Read by Arsen Dasilva MD (02/27/25 08:34:36, [facility] - Emergency Dept, Emergency Medicine)    My EKG interpretation, sinus rhythm, 71 beats per minute, left axis deviation, no ST segment changes, when compared to previous EKG March 18, 2024 relatively unchanged                                  Imaging Results              X-Ray Chest AP Portable (Final result)  Result time 02/27/25 09:02:30      Final result by Remi Bonner MD (02/27/25 09:02:30)                   Impression:      As above      Electronically signed by: Remi Bonner MD  Date:    02/27/2025  Time:    09:02               Narrative:    EXAMINATION:  XR CHEST AP PORTABLE    CLINICAL HISTORY:  Cough, unspecified    TECHNIQUE:  Single views of the chest were performed.    COMPARISON:  Chest radiograph  dated August 29, 2020    FINDINGS:  The trachea and cardiomediastinal silhouette remains stable.  Similar to previous studies, hiatal hernia is again noted.  There is no evidence of pleural effusions, pneumothoraces or consolidations.  There is persistent elevation of the right hemidiaphragm.  Osseous structures demonstrate no evidence for acute fractures or dislocations.                                    X-Rays:   Independently Interpreted Readings:   Other Readings:  Chest x-ray-no focal infiltrate or opacity    Medications   sodium chloride 0.9% bolus 1,000 mL 1,000 mL (1,000 mLs Intravenous Bolus from Bag 2/27/25 0800)   ondansetron injection 4 mg (4 mg Intravenous Given 2/27/25 0801)   famotidine (PF) injection 20 mg (20 mg Intravenous Given 2/27/25 0802)   morphine injection 2 mg (2 mg Intravenous Given 2/27/25 0802)   acetaminophen tablet 1,000 mg (1,000 mg Oral Given 2/27/25 0910)   aluminum-magnesium hydroxide-simethicone 200-200-20 mg/5 mL suspension 15 mL (15 mLs Oral Given 2/27/25 0910)   ketorolac injection 9.999 mg (9.999 mg Intravenous Given 2/27/25 0911)     Medical Decision Making  Differential diagnosis-pneumonia, sepsis, flu, COVID, dehydration, acute kidney injury,    Flu positive, labs otherwise relatively stable.  Symptomatically improved in the emergency department.  Will plan for acute care at home referral.    Encouraged returning case of any worsening of symptoms.    Tolerating orals at this time with persistently benign abdominal examination.    Problems Addressed:  Body aches: acute illness or injury  Cough: acute illness or injury  Diarrhea, unspecified type: acute illness or injury  Hypertension, unspecified type: chronic illness or injury with exacerbation, progression, or side effects of treatment  Influenza A: acute illness or injury  Nausea: acute illness or injury  Not feeling great: acute illness or injury    Amount and/or Complexity of Data Reviewed  Independent Historian:  spouse     Details: Notes he had a recent similar constellation of symptoms  External Data Reviewed: labs, radiology, ECG and notes.     Details: History of hypertension  Labs: ordered. Decision-making details documented in ED Course.  Radiology: ordered and independent interpretation performed. Decision-making details documented in ED Course.  ECG/medicine tests: ordered and independent interpretation performed. Decision-making details documented in ED Course.    Risk  OTC drugs.  Prescription drug management.  Parenteral controlled substances.  Drug therapy requiring intensive monitoring for toxicity.  Decision regarding hospitalization.  Diagnosis or treatment significantly limited by social determinants of health.                                      Clinical Impression:  Final diagnoses:  [R05.9] Cough  [R68.89] Not feeling great  [J10.1] Influenza A (Primary)  [R11.0] Nausea  [R19.7] Diarrhea, unspecified type  [R52] Body aches  [I10] Hypertension, unspecified type          ED Disposition Condition    Discharge Stable          ED Prescriptions       Medication Sig Dispense Start Date End Date Auth. Provider    oseltamivir (TAMIFLU) 75 MG capsule Take 1 capsule (75 mg total) by mouth 2 (two) times daily. for 5 days 10 capsule 2/27/2025 3/4/2025 Arsen Dasilva MD    acetaminophen (TYLENOL) 500 MG tablet Take 2 tablets (1,000 mg total) by mouth every 6 (six) hours as needed. 50 tablet 2/27/2025 -- Arsen Dasilva MD    ondansetron (ZOFRAN-ODT) 4 MG TbDL Take 1 tablet (4 mg total) by mouth every 6 (six) hours as needed. 15 tablet 2/27/2025 -- Arsen Dasilva MD    loperamide (IMODIUM) 2 mg capsule Take 1 capsule (2 mg total) by mouth 4 (four) times daily as needed for Diarrhea. 12 capsule 2/27/2025 3/9/2025 Arsen Dasilva MD    diclofenac sodium (VOLTAREN ARTHRITIS PAIN) 1 % Gel Apply 2 g topically 4 (four) times daily. 350 g 2/27/2025 -- Arsen Dasilva MD          Follow-up  Information       Follow up With Specialties Details Why Contact Info    Johanne Leonard MD Internal Medicine Call in 1 day If symptoms worsen, For a follow up visit about today [address] [address]  [phone]                   [1]   Social History  Tobacco Use    Smoking status: Never    Smokeless tobacco: Never   Substance Use Topics    Alcohol use: Yes     Comment: socially    Drug use: Never        Arsen Dasilva MD  02/28/25 1427

## 2025-02-27 NOTE — DISCHARGE INSTRUCTIONS
Mrs. Lynch,    Thank you for letting me care for you today! It was nice meeting you, and I hope you feel better soon.   If you would like access to your chart and what was done today please utilize the [facility].   Please come back to [facility] for all of your future medical needs.    Our goal in the emergency department is to always give you outstanding care and exceptional service. You may receive a survey by mail or e-mail in the next week regarding your experience in our ED. We would greatly appreciate you completing and returning the survey. Your feedback provides us with a way to recognize our staff who give very good care and it helps us learn how to improve when your experience was below our aspiration of excellence.     Sincerely,    Arsen Dasilva MD  Board Certified Emergency Physician

## 2025-04-28 ENCOUNTER — LAB VISIT (OUTPATIENT)
Dept: LAB | Facility: OTHER | Age: 85
End: 2025-04-28
Attending: INTERNAL MEDICINE
Payer: MEDICARE

## 2025-04-28 DIAGNOSIS — Z13.29 SCREENING FOR ENDOCRINE, METABOLIC AND IMMUNITY DISORDER: ICD-10-CM

## 2025-04-28 DIAGNOSIS — Z13.228 SCREENING FOR ENDOCRINE, METABOLIC AND IMMUNITY DISORDER: ICD-10-CM

## 2025-04-28 DIAGNOSIS — Z13.0 SCREENING FOR ENDOCRINE, METABOLIC AND IMMUNITY DISORDER: ICD-10-CM

## 2025-04-28 PROCEDURE — 36415 COLL VENOUS BLD VENIPUNCTURE: CPT

## 2025-04-28 PROCEDURE — 86765 RUBEOLA ANTIBODY: CPT

## 2025-04-29 LAB
RUBEOLA (MEASLES) IGG (OHS): >300 AU/ML
RUBEOLA IGG INTERP. (OHS): POSITIVE

## 2025-05-21 ENCOUNTER — HOSPITAL ENCOUNTER (OUTPATIENT)
Dept: RADIOLOGY | Facility: OTHER | Age: 85
Discharge: HOME | End: 2025-05-21
Attending: INTERNAL MEDICINE
Payer: MEDICARE

## 2025-05-21 DIAGNOSIS — Z12.31 SCREENING MAMMOGRAM FOR BREAST CANCER: ICD-10-CM

## 2025-05-21 PROCEDURE — 77067 SCR MAMMO BI INCL CAD: CPT | Mod: TC

## 2025-05-21 PROCEDURE — 77067 SCR MAMMO BI INCL CAD: CPT | Mod: 26,,, | Performed by: RADIOLOGY

## 2025-05-21 PROCEDURE — 77063 BREAST TOMOSYNTHESIS BI: CPT | Mod: 26,,, | Performed by: RADIOLOGY

## 2025-07-26 ENCOUNTER — HOSPITAL ENCOUNTER (EMERGENCY)
Facility: OTHER | Age: 85
Discharge: HOME | End: 2025-07-26
Attending: EMERGENCY MEDICINE
Payer: MEDICARE

## 2025-07-26 VITALS
SYSTOLIC BLOOD PRESSURE: 162 MMHG | HEART RATE: 77 BPM | DIASTOLIC BLOOD PRESSURE: 74 MMHG | OXYGEN SATURATION: 98 % | TEMPERATURE: 98 F | RESPIRATION RATE: 18 BRPM

## 2025-07-26 DIAGNOSIS — R07.9 CHEST PAIN: ICD-10-CM

## 2025-07-26 DIAGNOSIS — R06.02 SHORTNESS OF BREATH ON EXERTION: ICD-10-CM

## 2025-07-26 DIAGNOSIS — R06.00 DYSPNEA, UNSPECIFIED TYPE: Primary | ICD-10-CM

## 2025-07-26 LAB
ABSOLUTE EOSINOPHIL (OHS): 0.18 K/UL
ABSOLUTE MONOCYTE (OHS): 0.34 K/UL (ref 0.3–1)
ABSOLUTE NEUTROPHIL COUNT (OHS): 3.59 K/UL (ref 1.8–7.7)
ALBUMIN SERPL BCP-MCNC: 4.2 G/DL (ref 3.5–5.2)
ALP SERPL-CCNC: 67 UNIT/L (ref 40–150)
ALT SERPL W/O P-5'-P-CCNC: 21 UNIT/L (ref 10–44)
ANION GAP (OHS): 12 MMOL/L (ref 8–16)
AST SERPL-CCNC: 38 UNIT/L (ref 11–45)
BASOPHILS # BLD AUTO: 0.02 K/UL
BASOPHILS NFR BLD AUTO: 0.3 %
BILIRUB SERPL-MCNC: 0.7 MG/DL (ref 0.1–1)
BUN SERPL-MCNC: 19 MG/DL (ref 8–23)
CALCIUM SERPL-MCNC: 9.7 MG/DL (ref 8.7–10.5)
CHLORIDE SERPL-SCNC: 106 MMOL/L (ref 95–110)
CO2 SERPL-SCNC: 22 MMOL/L (ref 23–29)
CREAT SERPL-MCNC: 0.6 MG/DL (ref 0.5–1.4)
CTP QC/QA: YES
CTP QC/QA: YES
D DIMER PPP IA.FEU-MCNC: 0.78 MG/L FEU
EAG (OHS): 105 MG/DL (ref 68–131)
ERYTHROCYTE [DISTWIDTH] IN BLOOD BY AUTOMATED COUNT: 14.1 % (ref 11.5–14.5)
GFR SERPLBLD CREATININE-BSD FMLA CKD-EPI: >60 ML/MIN/1.73/M2
GLUCOSE SERPL-MCNC: 115 MG/DL (ref 70–110)
HBA1C MFR BLD: 5.3 % (ref 4–5.6)
HCT VFR BLD AUTO: 47.3 % (ref 37–48.5)
HGB BLD-MCNC: 16.1 GM/DL (ref 12–16)
HOLD SPECIMEN: NORMAL
IMM GRANULOCYTES # BLD AUTO: 0.01 K/UL (ref 0–0.04)
IMM GRANULOCYTES NFR BLD AUTO: 0.2 % (ref 0–0.5)
LYMPHOCYTES # BLD AUTO: 2.14 K/UL (ref 1–4.8)
MCH RBC QN AUTO: 32.1 PG (ref 27–31)
MCHC RBC AUTO-ENTMCNC: 34 G/DL (ref 32–36)
MCV RBC AUTO: 94 FL (ref 82–98)
NUCLEATED RBC (/100WBC) (OHS): 0 /100 WBC
PLATELET # BLD AUTO: 253 K/UL (ref 150–450)
PMV BLD AUTO: 10 FL (ref 9.2–12.9)
POC MOLECULAR INFLUENZA A AGN: NEGATIVE
POC MOLECULAR INFLUENZA B AGN: NEGATIVE
POTASSIUM SERPL-SCNC: 4 MMOL/L (ref 3.5–5.1)
PROT SERPL-MCNC: 7.6 GM/DL (ref 6–8.4)
RBC # BLD AUTO: 5.01 M/UL (ref 4–5.4)
RELATIVE EOSINOPHIL (OHS): 2.9 %
RELATIVE LYMPHOCYTE (OHS): 34.1 % (ref 18–48)
RELATIVE MONOCYTE (OHS): 5.4 % (ref 4–15)
RELATIVE NEUTROPHIL (OHS): 57.1 % (ref 38–73)
SARS-COV-2 RDRP RESP QL NAA+PROBE: NEGATIVE
SODIUM SERPL-SCNC: 140 MMOL/L (ref 136–145)
TROPONIN I SERPL HS-MCNC: 3 NG/L
WBC # BLD AUTO: 6.28 K/UL (ref 3.9–12.7)

## 2025-07-26 PROCEDURE — 83036 HEMOGLOBIN GLYCOSYLATED A1C: CPT | Performed by: EMERGENCY MEDICINE

## 2025-07-26 PROCEDURE — 25000003 PHARM REV CODE 250: Performed by: EMERGENCY MEDICINE

## 2025-07-26 PROCEDURE — 99285 EMERGENCY DEPT VISIT HI MDM: CPT | Mod: 25

## 2025-07-26 PROCEDURE — 96361 HYDRATE IV INFUSION ADD-ON: CPT

## 2025-07-26 PROCEDURE — 87635 SARS-COV-2 COVID-19 AMP PRB: CPT | Performed by: EMERGENCY MEDICINE

## 2025-07-26 PROCEDURE — 80053 COMPREHEN METABOLIC PANEL: CPT | Performed by: EMERGENCY MEDICINE

## 2025-07-26 PROCEDURE — 84484 ASSAY OF TROPONIN QUANT: CPT | Performed by: EMERGENCY MEDICINE

## 2025-07-26 PROCEDURE — 96374 THER/PROPH/DIAG INJ IV PUSH: CPT

## 2025-07-26 PROCEDURE — 93010 ELECTROCARDIOGRAM REPORT: CPT | Mod: ,,, | Performed by: INTERNAL MEDICINE

## 2025-07-26 PROCEDURE — 85025 COMPLETE CBC W/AUTO DIFF WBC: CPT | Performed by: EMERGENCY MEDICINE

## 2025-07-26 PROCEDURE — 85379 FIBRIN DEGRADATION QUANT: CPT | Performed by: EMERGENCY MEDICINE

## 2025-07-26 PROCEDURE — 93005 ELECTROCARDIOGRAM TRACING: CPT

## 2025-07-26 PROCEDURE — 25500020 PHARM REV CODE 255: Performed by: EMERGENCY MEDICINE

## 2025-07-26 PROCEDURE — 63600175 PHARM REV CODE 636 W HCPCS: Performed by: EMERGENCY MEDICINE

## 2025-07-26 RX ORDER — METOCLOPRAMIDE HYDROCHLORIDE 5 MG/ML
10 INJECTION INTRAMUSCULAR; INTRAVENOUS
Status: COMPLETED | OUTPATIENT
Start: 2025-07-26 | End: 2025-07-26

## 2025-07-26 RX ORDER — ACETAMINOPHEN 325 MG/1
650 TABLET ORAL
Status: COMPLETED | OUTPATIENT
Start: 2025-07-26 | End: 2025-07-26

## 2025-07-26 RX ADMIN — SODIUM CHLORIDE 500 ML: 9 INJECTION, SOLUTION INTRAVENOUS at 05:07

## 2025-07-26 RX ADMIN — IOHEXOL 100 ML: 350 INJECTION, SOLUTION INTRAVENOUS at 12:07

## 2025-07-26 RX ADMIN — ACETAMINOPHEN 650 MG: 325 TABLET ORAL at 04:07

## 2025-07-26 RX ADMIN — METOCLOPRAMIDE 10 MG: 5 INJECTION, SOLUTION INTRAMUSCULAR; INTRAVENOUS at 04:07

## 2025-07-26 RX ADMIN — SODIUM CHLORIDE 500 ML: 0.9 INJECTION, SOLUTION INTRAVENOUS at 03:07

## 2025-07-26 NOTE — DISCHARGE INSTRUCTIONS
Thank you for letting us take care of you today! It was nice meeting you, and I hope you feel better soon.     Call your primary care doctor to make the first available appointment.     Keep all your medical appointments.     Take your regular medication as prescribed. Contact your primary care provider before running out of medication, or for any problems obtaining them.    Do not drive or operate heavy machinery while taking opioid or muscle relaxing medications. Examples include norco, percocet, xanax, valium, flexeril.     Overuse or long term use of pain and sedating medication may lead to addiction, dependence, organ failure, family and work problems, legal problems, accidental overdose, and death.    If you do not have health insurance, you probably can afford it:  Call [phone] ([facility] hotline) or go to www.[employer].[place].gov    Your evaluation in the ER does not suggest any emergent or life threatening medical condition requiring admission or immediate intervention beyond that provided in the ER.   However, the signs of a serious problem sometimes take more time to appear.     Do not hesitate to return to the ER if any of the following occur:    Weakness, dizziness, fainting, or loss of consciousness   Fever of 100.4ºF (38ºC) or higher  Any worse symptoms  Any new or concerning symptoms

## 2025-07-26 NOTE — ED PROVIDER NOTES
"  Source of History:  Medical record, patient, patient's .    Chief complaint:  Per triage note: "Chest Pain (Sent by Dr. Leonard for L sided chest pain x last night, with SOB, lightheadedness, nausea, and dizziness x this morning. Pt reports taking 325 mg ASA yesterday which resolved the chest pain. Denies cardiac hx. Dr. Perez pt. )  "    HPI:    Patient presents for evaluation of rehab for episode of left-sided chest pain yesterday, then severe dyspnea with minimal exertion at home this morning.  Patient reports mild cough and rhinorrhea over last several days.  No sick contacts.  She denies any new lower extremity pain.  No recent immobility.  She also reports lightheadedness, dizziness.          ROS:   See HPI for pertinent review of systems      Review of patient's allergies indicates:   Allergen Reactions    Hydrocortisone Swelling    Aspirin      Pt is okay to take 81mg daily    Cortisone     Hydrocodone Nausea And Vomiting       PMH:  As per HPI and below:  Past Medical History:   Diagnosis Date    Essential hypertension 08/31/2020    does not take any medication - Cardiologist is okay    Hiatal hernia     Migraine     Stomach ulcer     TB (pulmonary tuberculosis)        Past Surgical History:   Procedure Laterality Date    ARTHROSCOPIC TENOTOMY OF BICEPS TENDON Left 4/27/2023    Procedure: TENOTOMY, BICEPS, ARTHROSCOPIC;  Surgeon: Gus Groves MD;  Location: [facility];  Service: Orthopedics;  Laterality: Left;    DECOMPRESSION OF SUBACROMIAL SPACE Left 4/27/2023    Procedure: DECOMPRESSION, SUBACROMIAL SPACE;  Surgeon: Gus Groves MD;  Location: [facility] OR;  Service: Orthopedics;  Laterality: Left;    EXAMINATION UNDER ANESTHESIA Left 4/27/2023    Procedure: EXAM UNDER ANESTHESIA;  Surgeon: Gus Groves MD;  Location: [facility] OR;  Service: Orthopedics;  Laterality: Left;    EXCISION OF BURSA Left 4/27/2023    Procedure: BURSECTOMY;  Surgeon: Gus Groves MD;  Location: [facility] OR;  Service: " Orthopedics;  Laterality: Left;    ROTATOR CUFF REPAIR Left 4/27/2023    Procedure: REPAIR, ROTATOR CUFF;  Surgeon: Gus Groves MD;  Location: [facility] OR;  Service: Orthopedics;  Laterality: Left;    SHOULDER ARTHROSCOPY Left 4/27/2023    Procedure: ARTHROSCOPY, SHOULDER;  Surgeon: Gus Groves MD;  Location: [facility];  Service: Orthopedics;  Laterality: Left;    THORACENTESIS         Social History[1]    Physical Exam:      Nursing note and vitals reviewed.  BP (!) 160/72   Pulse 78   Temp 98.2 °F (36.8 °C) (Oral)   Resp 18   SpO2 99%     Constitutional: No distress. Walker at bedside.   Eyes: EOMI. No discharge. Anicteric.  HENT:   Neck: Normal range of motion. Neck supple.  Cardiovascular: Normal rate. No murmur, no gallop and no friction rub heard.   Pulmonary/Chest: No respiratory distress. Effort normal. No wheezes, no rales, no rhonchi.   Abdominal: Bowel sounds normal. Soft. No distension and no mass. There is no tenderness. There is no rebound, no guarding, no tenderness at McBurney's point.  Neurological: GCS 15. Alert and oriented to person, place, and time. No gross cranial nerve, light touch or strength deficit. Coordination normal.   Skin: Skin is warm and dry.   EXT: 2+ radial pulses.         Medical Decision Making / Independent Interpretations / External Records Reviewed:      Patient is an 84-year-old female with hypertension, GERD, history of tuberculosis, migraines who presents for evaluation of dyspnea with minimal exertion since this morning, after a brief episode of focal left-sided chest pain yesterday.  She reports recent rhinorrhea and mild cough.  No sick contacts.  Physical exam is nonfocal.    The initial differential included dysrhythmia, acute coronary syndrome, acute pulmonary embolus, pneumonia.       ED Course as of 07/26/25 1758   Sat Jul 26, 2025   1055 --  EKG Interpretation: normal sinus rhythm at 78 beats per minute, no STEMI, no significant acute ST/T abnormalities,  normal intervals.  No acute change compared to prior tracing.  --   [RC]   1133 I independently interpreted the patient's chest x-ray, notable for right pleural effusion versus hemidiaphragm elevation. Final read pending.     [RC]   1252 Pulse ox as low as 95% with ambulation on room air.  Patient had no difficulty walking, it was conversant throughout as she walked. [RC]   1516 Patient's workup is negative.  She feels much better.  Her  agrees that she appears much more energetic.  Her daughter is now at the bedside.  I called patient's PCP Dr. Leonard; there was no immediate answer, I left a voicemail.   --  I discussed with pt and/or guardian/caretaker that this evaluation in the ED does not suggest any emergent or life threatening medical condition requiring admission or further immediate intervention or diagnostics. Regardless, an unremarkable evaluation in the ED does not preclude the development or presence of a serious or life threatening condition. Pt was instructed to return for any worsening, new, changed, or concerning symptoms.     I had a detailed discussion with patient and/or guardian/caretaker regarding findings, plan, return precautions, importance of medication adherence, need to follow-up with a PCP and specialist. All questions answered.     Note was created using voice recognition software. It may have occasional typographical errors not identified and edited despite initial review prior to signing.   [RC]   1532 Patient history, findings, results, patient management discussed with Dr. Leonard who agrees with plan, recommends pt f/u with cardiology.  [RC]   1533 As patient was preparing for discharge, she reported feeling lightheaded walking from the bathroom. I offered IV fluid bolus, will reassess.  [RC]   1642 Patient again feels at her baseline.  She states that her headache earlier was exactly like her typical migraines, which has since peaked, and resolved.  Patient's   "states that her episode after walking back from the bathroom was identical to the episode this morning.  We will repeat trial of ambulation.  I anticipate discharge with cardiology follow up.  Patient states understanding, is agreeable with the plan.  Patient's  at bedside for discussion. [RC]   1652 Patient states that she still feels dizzy after 500cc fluid bolus.   I offered patient admission, but she is attributing her symptoms to not having eaten all day.  We will reassess after she gets additional fluids, and something to eat. [RC]   1710 Patient ambulated to and from the bathroom without assistance, feels near her baseline. Will reassessment.  [RC]   1753 Patient stood, ambulated, feels like she is ambulating at her baseline.  She states that she feels near her baseline, "but not my peppiest." I I offered her admission, but she declines, stating that she prefers discharge.  Patient's  agrees. [RC]      ED Course User Index  [RC] Kameron Villafana MD              Procedures      Medications   sodium chloride 0.9% bolus 500 mL 500 mL (500 mLs Intravenous New Bag 7/26/25 1708)   iohexoL (OMNIPAQUE 350) injection 100 mL (100 mLs Intravenous Given 7/26/25 1220)   sodium chloride 0.9% bolus 500 mL 500 mL (0 mLs Intravenous Stopped 7/26/25 1637)   metoclopramide injection 10 mg (10 mg Intravenous Given 7/26/25 1611)   acetaminophen tablet 650 mg (650 mg Oral Given 7/26/25 1611)       --  I decided to obtain the patient's medical records. I reviewed patient's prior external notes / results:  specialist documentation   and pharmacy / prescription records.   --  Additional Medical Decision Making: Prescription drug management, Hospitalization or escalation of care considered, and Decision regarding advanced imaging      Launch MDCalc MDM  MDCalc MDM Module  Jul 26 2025 5:57 PM [Kameron Villafana]  Data:  - Independent interpretation: I independently reviewed the CTA PA for PE W contr IV, XR port Chest AP 1 " view. See MDM section and/or ED Course for my interpretation. [Kameron Villafana]  - Test/documents/historian: 3+ tests ordered  Problems: Concern for Pulmonary Embolism, Concern for Hypertensive Emergency  Additional encounter diagnoses: Dyspnea, unspecified type, Shortness of breath on exertion  Risk: CTA PA for PE W contr IV (Iodinated IV contrast in patient w/ elevated risk)             No future appointments.       Diagnostic Impression:    1. Dyspnea, unspecified type    2. Chest pain    3. Shortness of breath on exertion             ED Disposition Condition    Discharge Good          ED Prescriptions    None       Follow-up Information       Follow up With Specialties Details Why Contact Info    Johanne Leonard MD Internal Medicine Schedule an appointment as soon as possible for a visit  For recheck with your primary care doctor [address] [place] [address]  [facility] [id number] [phone]      [facility] PULMONARY MEDICINE Pulmonology Schedule an appointment as soon as possible for a visit  For recheck with specialist [address] [place] [address]  [phone]    Jose Francisco Perez MD Interventional Cardiology, Nuclear Medicine, Cardiovascular Disease, Cardiology   [address] [place] [address]  [facility] [address]  [phone]                   [1]   Social History  Tobacco Use    Smoking status: Never    Smokeless tobacco: Never   Substance Use Topics    Alcohol use: Yes     Comment: socially    Drug use: Never        Kameron Villafana MD  07/26/25 6767

## 2025-07-26 NOTE — ED TRIAGE NOTES
Pt to ED states yesterday she began having chest pain, SOB, and lightheadedness. She took aspirin with relief of chest pain. States she began having SOB again today while walking around without her walker, which did not subside after resting. Pt is tachypnic, oxygen saturation 95% on RA.

## 2025-07-26 NOTE — ED NOTES
Pt stood up and reports she continues to feel light headed, MD notified and snacks provided.  
Walking oxygen saturation was 95% on room air. Walked approximately 20 feet.   
[Initial Evaluation] : an initial evaluation

## 2025-07-27 LAB
OHS QRS DURATION: 80 MS
OHS QTC CALCULATION: 449 MS

## 2025-07-28 ENCOUNTER — TELEPHONE (OUTPATIENT)
Dept: CARDIOLOGY | Facility: CLINIC | Age: 85
End: 2025-07-28
Payer: MEDICARE

## 2025-07-28 NOTE — TELEPHONE ENCOUNTER
LM for pt with ER follow up appt with Dr. Perez on 8/6 at 2:20pm. Left office phone number for pt to call back if she cannot make this appointment.

## 2025-07-28 NOTE — TELEPHONE ENCOUNTER
----- Message from Alessandra sent at 7/28/2025 10:33 AM CDT -----  7/28/25      [address] office called to schedule a hospital follow up for ptlina SOB- phone .[phone] (home)         Thanks        Alessandra MYRICK

## 2025-08-06 ENCOUNTER — OFFICE VISIT (OUTPATIENT)
Dept: CARDIOLOGY | Facility: CLINIC | Age: 85
End: 2025-08-06
Payer: MEDICARE

## 2025-08-06 VITALS
WEIGHT: 194 LBS | HEART RATE: 91 BPM | SYSTOLIC BLOOD PRESSURE: 116 MMHG | DIASTOLIC BLOOD PRESSURE: 72 MMHG | BODY MASS INDEX: 35.48 KG/M2 | OXYGEN SATURATION: 95 %

## 2025-08-06 DIAGNOSIS — I10 ESSENTIAL HYPERTENSION: ICD-10-CM

## 2025-08-06 DIAGNOSIS — R06.09 OTHER FORM OF DYSPNEA: Primary | ICD-10-CM

## 2025-08-06 DIAGNOSIS — E66.01 SEVERE OBESITY (BMI 35.0-39.9) WITH COMORBIDITY: ICD-10-CM

## 2025-08-06 PROBLEM — R06.00 DYSPNEA: Status: ACTIVE | Noted: 2025-08-06

## 2025-08-06 PROCEDURE — 99214 OFFICE O/P EST MOD 30 MIN: CPT | Mod: S$PBB,,, | Performed by: INTERNAL MEDICINE

## 2025-08-06 PROCEDURE — 99999 PR PBB SHADOW E&M-EST. PATIENT-LVL III: CPT | Mod: PBBFAC,,, | Performed by: INTERNAL MEDICINE

## 2025-08-06 PROCEDURE — 99213 OFFICE O/P EST LOW 20 MIN: CPT | Mod: PBBFAC | Performed by: INTERNAL MEDICINE

## 2025-08-06 NOTE — PROGRESS NOTES
Cardiology    8/6/2025  3:08 PM    Problem list  Problem List[1]    History of Present Illness      Last seen in July 2024.    Ms. Lynch presents today for follow up after recent ED visit last month for weakness and dyspnea. She experienced an adverse reaction to Tramadol, originally prescribed post-rotator cuff surgery. She developed multiple side effects including significant weakness, restlessness, dizziness, and nausea. She had sharp chest pain the day before seeking ED evaluation. Dr. Smith advised her to get checked out in the ED due to symptoms. She has since discontinued Tramadol use, noting the side effects were severe and matched the medication's documented potential adverse reactions. She denies ongoing chest pain and confirms she is no longer taking the medication. She reports a severe flu illness in February requiring ED visit. She describes generalized, intense body aches that were significantly distressing. She had received prior flu vaccination and believed the illness was from a different strain. She attempted Tamiflu treatment but could not tolerate the medication after 1 dose and subsequently managed symptoms without specific antiviral treatment. She ultimately recovered by managing the illness conservatively. She reports recent cataract surgery and prior rotator cuff surgery. She has a known history of hiatal hernia and Anatoliy's ulcer, which she has lived with for an extended period.     Overall, she has no complaints today.  She is satisfied with her blood pressure.          Medications  Current Medications[2]   Prior to Admission medications    Medication Sig Start Date End Date Taking? Authorizing Provider   acetaminophen (TYLENOL) 500 MG tablet Take 500 mg by mouth every 6 (six) hours as needed for Pain.   Yes Provider, Historical   acetaminophen (TYLENOL) 500 MG tablet Take 2 tablets (1,000 mg total) by mouth every 6 (six) hours as needed. 2/27/25  Yes Arsen Dasilva MD    ascorbic acid, vitamin C, (VITAMIN C) 1000 MG tablet Take 1,000 mg by mouth once daily.   Yes Provider, Historical   aspirin (ECOTRIN) 81 MG EC tablet Take 81 mg by mouth once daily.   Yes Provider, Historical   azelastine-fluticasone (DYMISTA) 137-50 mcg/spray Spry nassal spray SPRAY ONCE IN EACH NOSTRIL TWICE DAILY 2/26/25  Yes Johanne Leonard MD   calcium carbonate (CALCIUM 300 ORAL) Take by mouth.   Yes Provider, Historical   cetirizine (ZYRTEC) 10 MG tablet Take 10 mg by mouth once daily.   Yes Provider, Historical   co-enzyme Q-10 30 mg capsule Take 30 mg by mouth 3 (three) times daily.   Yes Provider, Historical   diclofenac sodium (VOLTAREN ARTHRITIS PAIN) 1 % Gel Apply 2 g topically 4 (four) times daily. 2/27/25  Yes Arsen Dasilva MD   Lactobacillus rhamnosus GG (CULTURELLE) 10 billion cell capsule Take 1 capsule by mouth once daily.   Yes Provider, Historical   magnesium 200 mg Tab Take 400 mg by mouth once.   Yes Provider, Historical   multivitamin (THERAGRAN) per tablet Take 1 tablet by mouth once daily.   Yes Provider, Historical   omega-3 fatty acids 1,000 mg Cap Take by mouth.   Yes Provider, Historical   ondansetron (ZOFRAN-ODT) 4 MG TbDL Take 2 tablets (8 mg total) by mouth every 8 (eight) hours as needed (nausea). 6/14/23  Yes Juanito Monique MD   ondansetron (ZOFRAN-ODT) 4 MG TbDL Take 1 tablet (4 mg total) by mouth every 6 (six) hours as needed. 2/27/25  Yes Arsen Dasilva MD   pantoprazole (PROTONIX) 40 MG tablet Take 1 tablet (40 mg total) by mouth once daily. 4/15/25  Yes Johanne Leonard MD   sucralfate (CARAFATE) 1 gram tablet TAKE 1 TABLET(1 GRAM) BY MOUTH FOUR TIMES DAILY AS NEEDED 4/14/25  Yes Johanne Leonard MD   UNABLE TO FIND CBD soft gels 30 mg   Yes Provider, Historical   vitamin D (VITAMIN D3) 1000 units Tab Take 1,000 Units by mouth once daily.   Yes Provider, Historical   famotidine (PEPCID) 10 MG tablet Take 10 mg by mouth 2 (two) times  daily.  Patient not taking: Reported on 8/6/2025    Provider, Historical   cloNIDine (CATAPRES) 0.1 MG tablet Take 1 tablet (0.1 mg total) by mouth 2 (two) times daily. 7/21/23 8/6/25  Jose Francisco Perez MD   pregabalin (LYRICA) 25 MG capsule Take 25 mg by mouth 2 (two) times daily. 2/5/25 8/6/25  Provider, Historical         History  Past Medical History:   Diagnosis Date    Essential hypertension 08/31/2020    does not take any medication - Cardiologist is rubén    Hiatal hernia     Migraine     Stomach ulcer     TB (pulmonary tuberculosis)      Past Surgical History:   Procedure Laterality Date    ARTHROSCOPIC TENOTOMY OF BICEPS TENDON Left 4/27/2023    Procedure: TENOTOMY, BICEPS, ARTHROSCOPIC;  Surgeon: Gus Groves MD;  Location: [facility];  Service: Orthopedics;  Laterality: Left;    DECOMPRESSION OF SUBACROMIAL SPACE Left 4/27/2023    Procedure: DECOMPRESSION, SUBACROMIAL SPACE;  Surgeon: Gus Groves MD;  Location: [facility] OR;  Service: Orthopedics;  Laterality: Left;    EXAMINATION UNDER ANESTHESIA Left 4/27/2023    Procedure: EXAM UNDER ANESTHESIA;  Surgeon: Gus Groves MD;  Location: [facility] OR;  Service: Orthopedics;  Laterality: Left;    EXCISION OF BURSA Left 4/27/2023    Procedure: BURSECTOMY;  Surgeon: Gus Groves MD;  Location: [facility] OR;  Service: Orthopedics;  Laterality: Left;    ROTATOR CUFF REPAIR Left 4/27/2023    Procedure: REPAIR, ROTATOR CUFF;  Surgeon: Gsu Groves MD;  Location: [facility];  Service: Orthopedics;  Laterality: Left;    SHOULDER ARTHROSCOPY Left 4/27/2023    Procedure: ARTHROSCOPY, SHOULDER;  Surgeon: Gus Groves MD;  Location: [facility];  Service: Orthopedics;  Laterality: Left;    THORACENTESIS       Social History[3]      Allergies  Review of patient's allergies indicates:   Allergen Reactions    Hydrocortisone Swelling    Tramadol Shortness Of Breath    Aspirin      Pt is okay to take 81mg daily    Cortisone     Hydrocodone Nausea And Vomiting         Review  of Systems   Review of Systems   Constitutional: Negative for decreased appetite, fever and weight loss.   HENT:  Negative for congestion and nosebleeds.    Eyes:  Negative for double vision, vision loss in left eye, vision loss in right eye and visual disturbance.   Cardiovascular:  Negative for chest pain, claudication, cyanosis, dyspnea on exertion, irregular heartbeat, leg swelling, near-syncope, orthopnea, palpitations, paroxysmal nocturnal dyspnea and syncope.   Respiratory:  Positive for shortness of breath. Negative for cough, hemoptysis, sleep disturbances due to breathing, snoring, sputum production and wheezing.    Endocrine: Negative for cold intolerance and heat intolerance.   Skin:  Negative for nail changes and rash.   Musculoskeletal:  Negative for joint pain, muscle cramps, muscle weakness and myalgias.   Gastrointestinal:  Negative for change in bowel habit, heartburn, hematemesis, hematochezia, hemorrhoids and melena.   Neurological:  Negative for dizziness, focal weakness and headaches.         Physical Exam  Wt Readings from Last 1 Encounters:   08/06/25 88 kg (194 lb)     BP Readings from Last 3 Encounters:   08/06/25 116/72   07/26/25 (!) 162/74   04/28/25 112/64     Pulse Readings from Last 1 Encounters:   08/06/25 91     Body mass index is 35.48 kg/m².    Physical Exam  Constitutional:       Appearance: She is well-developed.   HENT:      Head: Atraumatic.   Eyes:      General: No scleral icterus.  Neck:      Vascular: Normal carotid pulses. No carotid bruit, hepatojugular reflux or JVD.   Cardiovascular:      Rate and Rhythm: Normal rate and regular rhythm.      Chest Wall: PMI is not displaced.      Pulses: Intact distal pulses.           Carotid pulses are 2+ on the right side and 2+ on the left side.       Radial pulses are 2+ on the right side and 2+ on the left side.        Dorsalis pedis pulses are 2+ on the right side and 2+ on the left side.      Heart sounds: S1 normal and S2  normal. Murmur heard.      Early systolic murmur is present with a grade of 1/6.      No friction rub.   Pulmonary:      Effort: Pulmonary effort is normal. No respiratory distress.      Breath sounds: Normal breath sounds. No stridor. No wheezing or rales.   Chest:      Chest wall: No tenderness.   Abdominal:      General: Bowel sounds are normal.      Palpations: Abdomen is soft.   Musculoskeletal:      Cervical back: Neck supple. No edema.   Skin:     General: Skin is warm and dry.      Nails: There is no clubbing.   Neurological:      Mental Status: She is alert and oriented to person, place, and time.   Psychiatric:         Behavior: Behavior normal.         Thought Content: Thought content normal.             Assessment  1. Other form of dyspnea (Primary)  Being evaluated  - Echo; Future    2. Essential hypertension  Well controlled, continue medications and monitor    3. Severe obesity (BMI 35.0-39.9) with comorbidity  Unchanged        Plan and Discussion  Reviewed her emergency room visit from last month.  Reviewed and discussed her blood pressure is well controlled.  Recommend to continue with current medications.  For last echocardiogram was more than 4 years ago.  Will get an echocardiogram to assess her dyspnea and her systolic murmur.    Follow Up  2-3 months        Jose Francisco Perez MD, F.A.C.C, F.S.C.A.I.      Total professional time spent for the encounter: 30 minutes  Time was spent preparing to see the patient, reviewing results of prior testing, obtaining and/or reviewing separately obtained history, performing a medically appropriate examination and interview, counseling and educating the patient/family, ordering medications/tests/procedures, referring and communicating with other health care professionals, documenting clinical information in the electronic health record, and independently interpreting results.    This note was generated with the assistance of ambient listening technology. Verbal  consent was obtained by the patient and accompanying visitor(s) for the recording of patient appointment to facilitate this note. I attest to having reviewed and edited the generated note for accuracy, though some syntax or spelling errors may persist. Please contact the author of this note for any clarification.         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      acetaminophen (TYLENOL) 500 MG tablet Take 2 tablets (1,000 mg total) by mouth every 6 (six) hours as needed. 50 tablet 0    ascorbic acid, vitamin C, (VITAMIN C) 1000 MG tablet Take 1,000 mg by mouth once daily.      aspirin (ECOTRIN) 81 MG EC tablet Take 81 mg by mouth once daily.      azelastine-fluticasone (DYMISTA) 137-50 mcg/spray Spry nassal spray SPRAY ONCE IN EACH NOSTRIL TWICE DAILY 23 g 2    calcium carbonate (CALCIUM 300 ORAL) Take by mouth.      cetirizine (ZYRTEC) 10 MG tablet Take 10 mg by mouth once daily.      co-enzyme Q-10 30 mg capsule Take 30 mg by mouth 3 (three) times daily.      diclofenac sodium (VOLTAREN ARTHRITIS PAIN) 1 % Gel Apply 2 g topically 4 (four) times daily. 350 g 0    Lactobacillus rhamnosus GG (CULTURELLE) 10 billion cell capsule Take 1 capsule by mouth once daily.      magnesium 200 mg Tab Take 400 mg by mouth once.      multivitamin  (THERAGRAN) per tablet Take 1 tablet by mouth once daily.      omega-3 fatty acids 1,000 mg Cap Take by mouth.      ondansetron (ZOFRAN-ODT) 4 MG TbDL Take 2 tablets (8 mg total) by mouth every 8 (eight) hours as needed (nausea). 20 tablet 1    ondansetron (ZOFRAN-ODT) 4 MG TbDL Take 1 tablet (4 mg total) by mouth every 6 (six) hours as needed. 15 tablet 0    pantoprazole (PROTONIX) 40 MG tablet Take 1 tablet (40 mg total) by mouth once daily. 90 tablet 1    sucralfate (CARAFATE) 1 gram tablet TAKE 1 TABLET(1 GRAM) BY MOUTH FOUR TIMES DAILY AS NEEDED 45 tablet 6    UNABLE TO FIND CBD soft gels 30 mg      vitamin D (VITAMIN D3) 1000 units Tab Take 1,000 Units by mouth once daily.      famotidine (PEPCID) 10 MG tablet Take 10 mg by mouth 2 (two) times daily. (Patient not taking: Reported on 8/6/2025)       No current facility-administered medications for this visit.     Facility-Administered Medications Ordered in Other Visits   Medication Dose Route Frequency Provider Last Rate Last Admin    tranexamic acid injection Soln 1,000 mg  1,000 mg Intravenous On Call Procedure Gus Groves MD       [3]   Social History  Socioeconomic History    Marital status:    Tobacco Use    Smoking status: Never    Smokeless tobacco: Never   Substance and Sexual Activity    Alcohol use: Yes     Comment: socially    Drug use: Never    Sexual activity: Yes     Social Drivers of Health     Financial Resource Strain: Low Risk  (7/30/2025)    Overall Financial Resource Strain (CARDIA)     Difficulty of Paying Living Expenses: Not hard at all   Food Insecurity: No Food Insecurity (7/30/2025)    Hunger Vital Sign     Worried About Running Out of Food in the Last Year: Never true     Ran Out of Food in the Last Year: Never true   Transportation Needs: No Transportation Needs (7/30/2025)    PRAPARE - Transportation     Lack of Transportation (Medical): No     Lack of Transportation (Non-Medical): No   Physical Activity: Unknown  (7/30/2025)    Exercise Vital Sign     Days of Exercise per Week: 0 days   Stress: No Stress Concern Present (7/30/2025)    [employer] - Occupational Stress Questionnaire     Feeling of Stress : Only a little   Housing Stability: Low Risk  (7/30/2025)    Housing Stability Vital Sign     Unable to Pay for Housing in the Last Year: No     Number of Times Moved in the Last Year: 0     Homeless in the Last Year: No

## 2025-08-12 ENCOUNTER — HOSPITAL ENCOUNTER (OUTPATIENT)
Dept: CARDIOLOGY | Facility: OTHER | Age: 85
Discharge: HOME | End: 2025-08-12
Attending: INTERNAL MEDICINE
Payer: MEDICARE

## 2025-08-12 DIAGNOSIS — R06.09 OTHER FORM OF DYSPNEA: ICD-10-CM

## 2025-08-12 LAB
AORTIC ROOT ANNULUS: 1.6 CM
ASCENDING AORTA: 2.6 CM
AV INDEX (PROSTH): 0.65
AV MEAN GRADIENT: 4 MMHG
AV PEAK GRADIENT: 7 MMHG
AV REGURGITATION PRESSURE HALF TIME: 513 MS
AV VALVE AREA BY VELOCITY RATIO: 1.8 CM²
AV VALVE AREA: 1.7 CM²
AV VELOCITY RATIO: 0.69
CV ECHO LV RWT: 0.75 CM
DOP CALC AO PEAK VEL: 1.3 M/S
DOP CALC AO VTI: 26 CM
DOP CALC LVOT AREA: 2.5 CM2
DOP CALC LVOT DIAMETER: 1.8 CM
DOP CALC LVOT PEAK VEL: 0.9 M/S
DOP CALCLVOT PEAK VEL VTI: 16.9 CM
E WAVE DECELERATION TIME: 232 MSEC
E/A RATIO: 0.69
E/E' RATIO: 11 M/S
ECHO LV POSTERIOR WALL: 1.2 CM (ref 0.6–1.1)
FRACTIONAL SHORTENING: 43.8 % (ref 28–44)
INTERVENTRICULAR SEPTUM: 1.2 CM (ref 0.6–1.1)
IVC DIAMETER: 2 CM
LA MAJOR: 3.5 CM
LA MINOR: 3.9 CM
LA WIDTH: 3.1 CM
LAAS-AP2: 13 CM2
LAAS-AP4: 14 CM2
LEFT ATRIUM AREA SYSTOLIC (APICAL 2 CHAMBER): 14.27 CM2
LEFT ATRIUM AREA SYSTOLIC (APICAL 4 CHAMBER): 13.25 CM2
LEFT ATRIUM SIZE: 2 CM
LEFT ATRIUM VOLUME MOD: 34 ML
LEFT ATRIUM VOLUME: 19 CM3
LEFT INTERNAL DIMENSION IN SYSTOLE: 1.8 CM (ref 2.1–4)
LEFT VENTRICLE DIASTOLIC VOLUME: 40 ML
LEFT VENTRICLE END SYSTOLIC VOLUME APICAL 2 CHAMBER: 37.66 ML
LEFT VENTRICLE END SYSTOLIC VOLUME APICAL 4 CHAMBER: 30.17 ML
LEFT VENTRICLE SYSTOLIC VOLUME: 10 ML
LEFT VENTRICULAR INTERNAL DIMENSION IN DIASTOLE: 3.2 CM (ref 3.5–6)
LEFT VENTRICULAR MASS: 119.4 G
LV LATERAL E/E' RATIO: 9.9 M/S
LV SEPTAL E/E' RATIO: 11.3 M/S
LVED V (TEICH): 39.85 ML
LVES V (TEICH): 10.3 ML
LVOT MG: 1.62 MMHG
LVOT MV: 0.61 CM/S
MV PEAK A VEL: 1.15 M/S
MV PEAK E VEL: 0.79 M/S
MV STENOSIS PRESSURE HALF TIME: 67.35 MS
MV VALVE AREA P 1/2 METHOD: 3.27 CM2
OHS CV RV/LV RATIO: 0.5 CM
PISA AR MAX VEL: 3.12 M/S
PV MV: 0.77 M/S
PV PEAK GRADIENT: 4 MMHG
PV PEAK VELOCITY: 1.06 M/S
RA MAJOR: 3.58 CM
RA PRESSURE ESTIMATED: 3 MMHG
RA WIDTH: 1.6 CM
RIGHT VENTRICLE DIASTOLIC BASEL DIMENSION: 1.6 CM
RIGHT VENTRICULAR END-DIASTOLIC DIMENSION: 1.62 CM
RV TISSUE DOPPLER FREE WALL SYSTOLIC VELOCITY 1 (APICAL 4 CHAMBER VIEW): 13.02 CM/S
SINUS: 2.5 CM
STJ: 2.7 CM
TDI LATERAL: 0.08 M/S
TDI SEPTAL: 0.07 M/S
TDI: 0.08 M/S
TRICUSPID ANNULAR PLANE SYSTOLIC EXCURSION: 2 CM

## 2025-08-12 PROCEDURE — 93306 TTE W/DOPPLER COMPLETE: CPT

## 2025-08-12 PROCEDURE — 93306 TTE W/DOPPLER COMPLETE: CPT | Mod: 26,,, | Performed by: INTERNAL MEDICINE

## (undated) DEVICE — BLADE SHAVER 4.5 6/BX

## (undated) DEVICE — ELECTRODE REM PLYHSV RETURN 9

## (undated) DEVICE — POSITIONER IV ARMBOARD FOAM

## (undated) DEVICE — DRESSING XEROFORM FOIL PK 1X8

## (undated) DEVICE — TAPE MEDIPORE 3 X 10YD

## (undated) DEVICE — CANNULA PASSPORT 8 MM X 4CM.

## (undated) DEVICE — DRESSING XEROFORM NONADH 1X8IN

## (undated) DEVICE — SPONGE COTTON TRAY 4X4IN

## (undated) DEVICE — APPLICATOR CHLORAPREP ORN 26ML

## (undated) DEVICE — SUT PDS II 0 CT VIL MONO 36

## (undated) DEVICE — SOL NACL IRR 3000ML

## (undated) DEVICE — KIT FIBERTAK DISP ANCHOR 2.6

## (undated) DEVICE — GLOVE BIOGEL SKINSENSE PI 8.0

## (undated) DEVICE — CUTTER BONE 4.5MM PLATINUM

## (undated) DEVICE — GLOVE BIOGEL SKINSENSE PI 7.5

## (undated) DEVICE — PAD SHOULDER CARE POLAR

## (undated) DEVICE — VIDEO RENTAL SERVICE

## (undated) DEVICE — BURR STONECUTTER ELITE 4.0MM

## (undated) DEVICE — SUT PROLENE 3-0 FS-1 MONO18

## (undated) DEVICE — SUPPORT SLING SHOT II MEDIUM

## (undated) DEVICE — UNDERGLOVES BIOGEL PI SIZE 8

## (undated) DEVICE — PAD ABDOMINAL STERILE 8X10IN

## (undated) DEVICE — NDL ARTHSCP MF SCORPION

## (undated) DEVICE — Device

## (undated) DEVICE — SET EXT CLEARLINK LL 44IN

## (undated) DEVICE — WAND WEREWOLF COBLATION 90

## (undated) DEVICE — DRESSING TRANS 4X4 TEGADERM

## (undated) DEVICE — TUBE SET INFLOW/OUTFLOW

## (undated) DEVICE — DRAPE STERI U-SHAPED 47X51IN

## (undated) DEVICE — ALCOHOL ISOPROPYL BLU 70% 16OZ

## (undated) DEVICE — SUT SUTURETAPE TIGERLINK 1.3MM

## (undated) DEVICE — SOL IRR SOD CHL .9% POUR